# Patient Record
Sex: FEMALE | Race: OTHER | Employment: OTHER
[De-identification: names, ages, dates, MRNs, and addresses within clinical notes are randomized per-mention and may not be internally consistent; named-entity substitution may affect disease eponyms.]

---

## 2017-01-25 ENCOUNTER — TELEPHONE (OUTPATIENT)
Dept: OBGYN | Facility: CLINIC | Age: 62
End: 2017-01-25

## 2017-01-31 ENCOUNTER — OFFICE VISIT (OUTPATIENT)
Dept: FAMILY MEDICINE CLINIC | Facility: CLINIC | Age: 62
End: 2017-01-31

## 2017-01-31 VITALS
DIASTOLIC BLOOD PRESSURE: 58 MMHG | WEIGHT: 193 LBS | BODY MASS INDEX: 31.02 KG/M2 | RESPIRATION RATE: 18 BRPM | SYSTOLIC BLOOD PRESSURE: 98 MMHG | HEIGHT: 66 IN | TEMPERATURE: 98.6 F

## 2017-01-31 DIAGNOSIS — R50.9 FEVER, UNSPECIFIED FEVER CAUSE: ICD-10-CM

## 2017-01-31 DIAGNOSIS — J18.9 PNEUMONIA OF RIGHT LOWER LOBE DUE TO INFECTIOUS ORGANISM: Primary | ICD-10-CM

## 2017-01-31 DIAGNOSIS — R05.9 COUGH: ICD-10-CM

## 2017-01-31 LAB
INFLUENZA A ANTIBODY: NEGATIVE
INFLUENZA B ANTIBODY: NEGATIVE
S PYO AG THROAT QL: NORMAL

## 2017-01-31 PROCEDURE — 87804 INFLUENZA ASSAY W/OPTIC: CPT | Performed by: FAMILY MEDICINE

## 2017-01-31 PROCEDURE — 99213 OFFICE O/P EST LOW 20 MIN: CPT | Performed by: FAMILY MEDICINE

## 2017-01-31 PROCEDURE — 87880 STREP A ASSAY W/OPTIC: CPT | Performed by: FAMILY MEDICINE

## 2017-01-31 RX ORDER — AZITHROMYCIN 250 MG/1
TABLET, FILM COATED ORAL
Qty: 1 PACKET | Refills: 0 | Status: SHIPPED | OUTPATIENT
Start: 2017-01-31 | End: 2017-02-10

## 2017-01-31 ASSESSMENT — ENCOUNTER SYMPTOMS
ABDOMINAL PAIN: 0
HEMOPTYSIS: 0
VOMITING: 0
COLOR CHANGE: 0
WHEEZING: 0
ABDOMINAL DISTENTION: 0
STRIDOR: 0
ANAL BLEEDING: 0
EYE REDNESS: 0
PHOTOPHOBIA: 0
SINUS PRESSURE: 0
EYE DISCHARGE: 0
VOICE CHANGE: 0
APNEA: 0
RHINORRHEA: 0
BLOOD IN STOOL: 0
RECTAL PAIN: 0
EYE ITCHING: 0
NAUSEA: 0
EYE PAIN: 0
SORE THROAT: 1
CHEST TIGHTNESS: 0
TROUBLE SWALLOWING: 0
CHOKING: 0
CONSTIPATION: 0
COUGH: 0
DIARRHEA: 0
SHORTNESS OF BREATH: 0
ALLERGIC/IMMUNOLOGIC NEGATIVE: 1
BACK PAIN: 0
FACIAL SWELLING: 0
HEARTBURN: 0

## 2017-02-27 DIAGNOSIS — E03.9 ACQUIRED HYPOTHYROIDISM: Primary | ICD-10-CM

## 2017-03-30 ENCOUNTER — HOSPITAL ENCOUNTER (OUTPATIENT)
Age: 62
Discharge: HOME | End: 2017-03-30
Payer: MEDICARE

## 2017-03-30 DIAGNOSIS — E03.9 ACQUIRED HYPOTHYROIDISM: ICD-10-CM

## 2017-03-30 LAB — TSH SERPL DL<=0.05 MIU/L-ACNC: 2.56 MIU/L (ref 0.3–5)

## 2017-03-30 PROCEDURE — 84443 ASSAY THYROID STIM HORMONE: CPT

## 2017-03-30 PROCEDURE — 36415 COLL VENOUS BLD VENIPUNCTURE: CPT

## 2017-04-04 ENCOUNTER — OFFICE VISIT (OUTPATIENT)
Dept: FAMILY MEDICINE CLINIC | Age: 62
End: 2017-04-04
Payer: MEDICARE

## 2017-04-04 VITALS
DIASTOLIC BLOOD PRESSURE: 72 MMHG | RESPIRATION RATE: 16 BRPM | HEIGHT: 66 IN | HEART RATE: 72 BPM | SYSTOLIC BLOOD PRESSURE: 124 MMHG | BODY MASS INDEX: 31.5 KG/M2 | WEIGHT: 196 LBS

## 2017-04-04 DIAGNOSIS — K21.9 GERD WITHOUT ESOPHAGITIS: ICD-10-CM

## 2017-04-04 DIAGNOSIS — E03.9 ACQUIRED HYPOTHYROIDISM: ICD-10-CM

## 2017-04-04 DIAGNOSIS — H61.21 EXCESSIVE CERUMEN IN RIGHT EAR CANAL: Primary | ICD-10-CM

## 2017-04-04 PROCEDURE — 69209 REMOVE IMPACTED EAR WAX UNI: CPT | Performed by: FAMILY MEDICINE

## 2017-04-04 PROCEDURE — 99213 OFFICE O/P EST LOW 20 MIN: CPT | Performed by: FAMILY MEDICINE

## 2017-04-04 RX ORDER — OMEPRAZOLE 20 MG/1
20 CAPSULE, DELAYED RELEASE ORAL DAILY
Qty: 30 CAPSULE | Refills: 5 | Status: SHIPPED | OUTPATIENT
Start: 2017-04-04 | End: 2017-09-25 | Stop reason: SDUPTHER

## 2017-04-04 RX ORDER — LEVOTHYROXINE SODIUM 0.05 MG/1
50 TABLET ORAL DAILY
Qty: 30 TABLET | Refills: 5 | Status: SHIPPED | OUTPATIENT
Start: 2017-04-04 | End: 2017-09-25 | Stop reason: SDUPTHER

## 2017-04-04 ASSESSMENT — ENCOUNTER SYMPTOMS
COUGH: 0
CHEST TIGHTNESS: 0
COLOR CHANGE: 0
SORE THROAT: 0
NAUSEA: 0
VOMITING: 0
CHOKING: 0
CONSTIPATION: 0
BELCHING: 0
WATER BRASH: 0
ALLERGIC/IMMUNOLOGIC NEGATIVE: 1
WHEEZING: 0
DIARRHEA: 0
HOARSE VOICE: 0
BACK PAIN: 0
PHOTOPHOBIA: 0
EYE ITCHING: 0
ANAL BLEEDING: 0
HEARTBURN: 1
ABDOMINAL DISTENTION: 0
VOICE CHANGE: 0
RECTAL PAIN: 0
ABDOMINAL PAIN: 0
STRIDOR: 0
SINUS PRESSURE: 0
FACIAL SWELLING: 0
APNEA: 0
EYE REDNESS: 0
EYE DISCHARGE: 0
GLOBUS SENSATION: 0
EYE PAIN: 0
BLOOD IN STOOL: 0
SHORTNESS OF BREATH: 0
RHINORRHEA: 0
TROUBLE SWALLOWING: 0

## 2017-09-05 ENCOUNTER — OFFICE VISIT (OUTPATIENT)
Dept: SURGERY | Age: 62
End: 2017-09-05
Payer: MEDICARE

## 2017-09-05 VITALS
SYSTOLIC BLOOD PRESSURE: 127 MMHG | WEIGHT: 197 LBS | HEIGHT: 66 IN | RESPIRATION RATE: 18 BRPM | HEART RATE: 72 BPM | DIASTOLIC BLOOD PRESSURE: 73 MMHG | BODY MASS INDEX: 31.66 KG/M2

## 2017-09-05 DIAGNOSIS — R10.9 CHRONIC ABDOMINAL PAIN: Primary | ICD-10-CM

## 2017-09-05 DIAGNOSIS — Z87.19 HISTORY OF GASTRITIS: ICD-10-CM

## 2017-09-05 DIAGNOSIS — N90.4 VULVAR DYSTROPHY: Primary | ICD-10-CM

## 2017-09-05 DIAGNOSIS — N28.1 BILATERAL RENAL CYSTS: ICD-10-CM

## 2017-09-05 DIAGNOSIS — G89.29 CHRONIC ABDOMINAL PAIN: Primary | ICD-10-CM

## 2017-09-05 DIAGNOSIS — Z86.19 HISTORY OF HELICOBACTER PYLORI INFECTION: ICD-10-CM

## 2017-09-05 PROCEDURE — 99214 OFFICE O/P EST MOD 30 MIN: CPT | Performed by: SURGERY

## 2017-09-06 RX ORDER — CLOBETASOL PROPIONATE 0.5 MG/G
CREAM TOPICAL
Qty: 60 G | Refills: 1 | Status: SHIPPED | OUTPATIENT
Start: 2017-09-06 | End: 2018-01-30 | Stop reason: ALTCHOICE

## 2017-09-15 ENCOUNTER — HOSPITAL ENCOUNTER (INPATIENT)
Age: 62
LOS: 3 days | Discharge: HOME | DRG: 139 | End: 2017-09-18
Attending: EMERGENCY MEDICINE | Admitting: INTERNAL MEDICINE
Payer: MEDICARE

## 2017-09-15 ENCOUNTER — APPOINTMENT (OUTPATIENT)
Dept: GENERAL RADIOLOGY | Age: 62
DRG: 139 | End: 2017-09-15
Payer: MEDICARE

## 2017-09-15 DIAGNOSIS — J18.9 PNEUMONIA DUE TO ORGANISM: Primary | ICD-10-CM

## 2017-09-15 DIAGNOSIS — R09.02 HYPOXIA: ICD-10-CM

## 2017-09-15 LAB
ABSOLUTE EOS #: ABNORMAL K/UL (ref 0–0.4)
ABSOLUTE LYMPH #: 0.9 K/UL (ref 1–4.8)
ABSOLUTE MONO #: 0.68 K/UL (ref 0–1)
ALBUMIN SERPL-MCNC: 3.6 G/DL (ref 3.5–5.2)
ALBUMIN/GLOBULIN RATIO: ABNORMAL (ref 1–2.5)
ALP BLD-CCNC: 73 U/L (ref 35–104)
ALT SERPL-CCNC: 35 U/L (ref 5–33)
ANION GAP SERPL CALCULATED.3IONS-SCNC: 11 MMOL/L (ref 9–17)
AST SERPL-CCNC: 16 U/L
BASOPHILS # BLD: ABNORMAL %
BASOPHILS ABSOLUTE: ABNORMAL K/UL (ref 0–0.2)
BILIRUB SERPL-MCNC: 1.32 MG/DL (ref 0.3–1.2)
BUN BLDV-MCNC: 20 MG/DL (ref 8–23)
BUN/CREAT BLD: 23 (ref 9–20)
CALCIUM SERPL-MCNC: 9.1 MG/DL (ref 8.6–10.4)
CHLORIDE BLD-SCNC: 98 MMOL/L (ref 98–107)
CO2: 26 MMOL/L (ref 20–31)
CREAT SERPL-MCNC: 0.88 MG/DL (ref 0.5–0.9)
DIFFERENTIAL TYPE: ABNORMAL
EOSINOPHILS RELATIVE PERCENT: ABNORMAL %
FIO2: 21
GFR AFRICAN AMERICAN: >60 ML/MIN
GFR NON-AFRICAN AMERICAN: >60 ML/MIN
GFR SERPL CREATININE-BSD FRML MDRD: ABNORMAL ML/MIN/{1.73_M2}
GFR SERPL CREATININE-BSD FRML MDRD: ABNORMAL ML/MIN/{1.73_M2}
GLUCOSE BLD-MCNC: 115 MG/DL (ref 70–99)
GLUCOSE BLD-MCNC: 116 MG/DL (ref 65–99)
HCO3: 26.2 MMOL/L (ref 22–26)
HCT VFR BLD CALC: 43.7 % (ref 36–46)
HEMOGLOBIN: 14.6 G/DL (ref 12–16)
LYMPHOCYTES # BLD: 4 %
MCH RBC QN AUTO: 28 PG (ref 26–34)
MCHC RBC AUTO-ENTMCNC: 33.3 G/DL (ref 31–37)
MCV RBC AUTO: 84.3 FL (ref 80–100)
MONOCYTES # BLD: 3 %
MORPHOLOGY: ABNORMAL
NEGATIVE BASE EXCESS: ABNORMAL (ref 0–2)
O2 DEVICE/FLOW/%: ABNORMAL
O2 SATURATION: 86 % (ref 95–98)
PATIENT TEMP: ABNORMAL
PCO2: 37 MM HG (ref 35–45)
PDW BLD-RTO: 14.7 % (ref 12.1–15.2)
PH: 7.46 (ref 7.27–7.47)
PLATELET # BLD: 211 K/UL (ref 140–450)
PLATELET ESTIMATE: ABNORMAL
PMV BLD AUTO: ABNORMAL FL (ref 6–12)
PO2: 48 MM HG (ref 80–100)
POC PCO2 TEMP: ABNORMAL MM HG
POC PH TEMP: ABNORMAL
POC PO2 TEMP: ABNORMAL MM HG
POC TCO2: 27 MMOL/L (ref 24–29)
POSITIVE BASE EXCESS: 2 (ref 0–2)
POTASSIUM SERPL-SCNC: 4 MMOL/L (ref 3.7–5.3)
RBC # BLD: 5.19 M/UL (ref 4–5.2)
RBC # BLD: ABNORMAL 10*6/UL
SEG NEUTROPHILS: 93 %
SEGMENTED NEUTROPHILS ABSOLUTE COUNT: 21.02 K/UL (ref 2.5–7)
SODIUM BLD-SCNC: 135 MMOL/L (ref 135–144)
TOTAL PROTEIN: 8 G/DL (ref 6.4–8.3)
WBC # BLD: 22.6 K/UL (ref 3.5–11)
WBC # BLD: ABNORMAL 10*3/UL

## 2017-09-15 PROCEDURE — 6360000002 HC RX W HCPCS: Performed by: INTERNAL MEDICINE

## 2017-09-15 PROCEDURE — 99284 EMERGENCY DEPT VISIT MOD MDM: CPT

## 2017-09-15 PROCEDURE — 6360000002 HC RX W HCPCS: Performed by: EMERGENCY MEDICINE

## 2017-09-15 PROCEDURE — 94667 MNPJ CHEST WALL 1ST: CPT

## 2017-09-15 PROCEDURE — 80053 COMPREHEN METABOLIC PANEL: CPT

## 2017-09-15 PROCEDURE — 82947 ASSAY GLUCOSE BLOOD QUANT: CPT

## 2017-09-15 PROCEDURE — 94668 MNPJ CHEST WALL SBSQ: CPT

## 2017-09-15 PROCEDURE — 82803 BLOOD GASES ANY COMBINATION: CPT

## 2017-09-15 PROCEDURE — 94664 DEMO&/EVAL PT USE INHALER: CPT

## 2017-09-15 PROCEDURE — 85025 COMPLETE CBC W/AUTO DIFF WBC: CPT

## 2017-09-15 PROCEDURE — 71020 XR CHEST STANDARD TWO VW: CPT

## 2017-09-15 PROCEDURE — 36600 WITHDRAWAL OF ARTERIAL BLOOD: CPT

## 2017-09-15 PROCEDURE — 87040 BLOOD CULTURE FOR BACTERIA: CPT

## 2017-09-15 PROCEDURE — 94640 AIRWAY INHALATION TREATMENT: CPT

## 2017-09-15 PROCEDURE — 1200000000 HC SEMI PRIVATE

## 2017-09-15 PROCEDURE — 93005 ELECTROCARDIOGRAM TRACING: CPT

## 2017-09-15 PROCEDURE — 6370000000 HC RX 637 (ALT 250 FOR IP): Performed by: INTERNAL MEDICINE

## 2017-09-15 PROCEDURE — 36415 COLL VENOUS BLD VENIPUNCTURE: CPT

## 2017-09-15 PROCEDURE — 2580000003 HC RX 258: Performed by: INTERNAL MEDICINE

## 2017-09-15 PROCEDURE — 96365 THER/PROPH/DIAG IV INF INIT: CPT

## 2017-09-15 PROCEDURE — 94761 N-INVAS EAR/PLS OXIMETRY MLT: CPT

## 2017-09-15 RX ORDER — SODIUM CHLORIDE 0.9 % (FLUSH) 0.9 %
10 SYRINGE (ML) INJECTION PRN
Status: DISCONTINUED | OUTPATIENT
Start: 2017-09-15 | End: 2017-09-18 | Stop reason: HOSPADM

## 2017-09-15 RX ORDER — ONDANSETRON 2 MG/ML
4 INJECTION INTRAMUSCULAR; INTRAVENOUS EVERY 6 HOURS PRN
Status: DISCONTINUED | OUTPATIENT
Start: 2017-09-15 | End: 2017-09-18 | Stop reason: HOSPADM

## 2017-09-15 RX ORDER — ALBUTEROL SULFATE 2.5 MG/3ML
2.5 SOLUTION RESPIRATORY (INHALATION) EVERY 6 HOURS PRN
COMMUNITY
End: 2017-10-10

## 2017-09-15 RX ORDER — PREDNISONE 20 MG/1
20 TABLET ORAL DAILY
COMMUNITY
End: 2017-10-10

## 2017-09-15 RX ORDER — LEVOFLOXACIN 5 MG/ML
500 INJECTION, SOLUTION INTRAVENOUS ONCE
Status: COMPLETED | OUTPATIENT
Start: 2017-09-15 | End: 2017-09-15

## 2017-09-15 RX ORDER — LEVOTHYROXINE SODIUM 0.03 MG/1
50 TABLET ORAL DAILY
Status: DISCONTINUED | OUTPATIENT
Start: 2017-09-15 | End: 2017-09-18 | Stop reason: HOSPADM

## 2017-09-15 RX ORDER — ACETAMINOPHEN 325 MG/1
650 TABLET ORAL EVERY 4 HOURS PRN
Status: DISCONTINUED | OUTPATIENT
Start: 2017-09-15 | End: 2017-09-18 | Stop reason: HOSPADM

## 2017-09-15 RX ORDER — BENZONATATE 100 MG/1
100 CAPSULE ORAL 3 TIMES DAILY PRN
COMMUNITY
End: 2017-11-27 | Stop reason: SDUPTHER

## 2017-09-15 RX ORDER — SODIUM CHLORIDE 0.9 % (FLUSH) 0.9 %
10 SYRINGE (ML) INJECTION EVERY 12 HOURS SCHEDULED
Status: DISCONTINUED | OUTPATIENT
Start: 2017-09-15 | End: 2017-09-18 | Stop reason: HOSPADM

## 2017-09-15 RX ORDER — ALBUTEROL SULFATE 2.5 MG/3ML
2.5 SOLUTION RESPIRATORY (INHALATION) ONCE
Status: COMPLETED | OUTPATIENT
Start: 2017-09-15 | End: 2017-09-15

## 2017-09-15 RX ORDER — LEVOFLOXACIN 5 MG/ML
500 INJECTION, SOLUTION INTRAVENOUS EVERY 24 HOURS
Status: DISCONTINUED | OUTPATIENT
Start: 2017-09-16 | End: 2017-09-16

## 2017-09-15 RX ORDER — SODIUM CHLORIDE 9 MG/ML
INJECTION, SOLUTION INTRAVENOUS CONTINUOUS
Status: DISCONTINUED | OUTPATIENT
Start: 2017-09-15 | End: 2017-09-17

## 2017-09-15 RX ORDER — ALBUTEROL SULFATE 2.5 MG/3ML
2.5 SOLUTION RESPIRATORY (INHALATION) EVERY 4 HOURS
Status: DISCONTINUED | OUTPATIENT
Start: 2017-09-15 | End: 2017-09-17

## 2017-09-15 RX ORDER — CLOBETASOL PROPIONATE 0.5 MG/G
CREAM TOPICAL 2 TIMES DAILY
Status: DISCONTINUED | OUTPATIENT
Start: 2017-09-15 | End: 2017-09-18 | Stop reason: HOSPADM

## 2017-09-15 RX ORDER — METHYLPREDNISOLONE SODIUM SUCCINATE 125 MG/2ML
80 INJECTION, POWDER, LYOPHILIZED, FOR SOLUTION INTRAMUSCULAR; INTRAVENOUS EVERY 8 HOURS SCHEDULED
Status: DISCONTINUED | OUTPATIENT
Start: 2017-09-15 | End: 2017-09-17

## 2017-09-15 RX ORDER — PANTOPRAZOLE SODIUM 40 MG/1
40 TABLET, DELAYED RELEASE ORAL
Status: DISCONTINUED | OUTPATIENT
Start: 2017-09-15 | End: 2017-09-18 | Stop reason: HOSPADM

## 2017-09-15 RX ADMIN — Medication 10 ML: at 21:35

## 2017-09-15 RX ADMIN — ALBUTEROL SULFATE 2.5 MG: 2.5 SOLUTION RESPIRATORY (INHALATION) at 17:37

## 2017-09-15 RX ADMIN — METHYLPREDNISOLONE SODIUM SUCCINATE 80 MG: 125 INJECTION, POWDER, FOR SOLUTION INTRAMUSCULAR; INTRAVENOUS at 16:37

## 2017-09-15 RX ADMIN — ALBUTEROL SULFATE 2.5 MG: 2.5 SOLUTION RESPIRATORY (INHALATION) at 09:52

## 2017-09-15 RX ADMIN — METHYLPREDNISOLONE SODIUM SUCCINATE 80 MG: 125 INJECTION, POWDER, FOR SOLUTION INTRAMUSCULAR; INTRAVENOUS at 21:35

## 2017-09-15 RX ADMIN — LEVOFLOXACIN 500 MG: 5 INJECTION, SOLUTION INTRAVENOUS at 12:00

## 2017-09-15 RX ADMIN — ALBUTEROL SULFATE 2.5 MG: 2.5 SOLUTION RESPIRATORY (INHALATION) at 21:04

## 2017-09-15 RX ADMIN — SODIUM CHLORIDE: 9 INJECTION, SOLUTION INTRAVENOUS at 13:48

## 2017-09-15 RX ADMIN — ENOXAPARIN SODIUM 40 MG: 40 INJECTION SUBCUTANEOUS at 16:36

## 2017-09-15 RX ADMIN — PANTOPRAZOLE SODIUM 40 MG: 40 TABLET, DELAYED RELEASE ORAL at 16:37

## 2017-09-15 RX ADMIN — ALBUTEROL SULFATE 2.5 MG: 2.5 SOLUTION RESPIRATORY (INHALATION) at 10:51

## 2017-09-15 ASSESSMENT — ENCOUNTER SYMPTOMS
CHOKING: 0
WHEEZING: 1
SINUS PRESSURE: 0
COUGH: 1
TROUBLE SWALLOWING: 0
CHEST TIGHTNESS: 0
GASTROINTESTINAL NEGATIVE: 1
ALLERGIC/IMMUNOLOGIC NEGATIVE: 1
RHINORRHEA: 0
EYE DISCHARGE: 0
APNEA: 0
FACIAL SWELLING: 0
SINUS CONGESTION: 0
VOICE CHANGE: 0
SHORTNESS OF BREATH: 0
STRIDOR: 0
EYES NEGATIVE: 1
SORE THROAT: 0

## 2017-09-15 ASSESSMENT — PAIN SCALES - GENERAL: PAINLEVEL_OUTOF10: 0

## 2017-09-16 LAB
ABSOLUTE BANDS #: 0.77 K/UL (ref 0–1)
ABSOLUTE EOS #: ABNORMAL K/UL (ref 0–0.4)
ABSOLUTE LYMPH #: 0.77 K/UL (ref 1–4.8)
ABSOLUTE MONO #: 0.39 K/UL (ref 0–1)
ANION GAP SERPL CALCULATED.3IONS-SCNC: 9 MMOL/L (ref 9–17)
BANDS: 4 %
BASOPHILS # BLD: ABNORMAL %
BASOPHILS ABSOLUTE: ABNORMAL K/UL (ref 0–0.2)
BUN BLDV-MCNC: 14 MG/DL (ref 8–23)
BUN/CREAT BLD: 23 (ref 9–20)
CALCIUM SERPL-MCNC: 9.1 MG/DL (ref 8.6–10.4)
CHLORIDE BLD-SCNC: 106 MMOL/L (ref 98–107)
CO2: 26 MMOL/L (ref 20–31)
CREAT SERPL-MCNC: 0.6 MG/DL (ref 0.5–0.9)
DIFFERENTIAL TYPE: ABNORMAL
EKG ATRIAL RATE: 79 BPM
EKG P AXIS: 65 DEGREES
EKG P-R INTERVAL: 146 MS
EKG Q-T INTERVAL: 366 MS
EKG QRS DURATION: 110 MS
EKG QTC CALCULATION (BAZETT): 419 MS
EKG R AXIS: 67 DEGREES
EKG T AXIS: 66 DEGREES
EKG VENTRICULAR RATE: 79 BPM
EOSINOPHILS RELATIVE PERCENT: ABNORMAL %
GFR AFRICAN AMERICAN: >60 ML/MIN
GFR NON-AFRICAN AMERICAN: >60 ML/MIN
GFR SERPL CREATININE-BSD FRML MDRD: ABNORMAL ML/MIN/{1.73_M2}
GFR SERPL CREATININE-BSD FRML MDRD: ABNORMAL ML/MIN/{1.73_M2}
GLUCOSE BLD-MCNC: 149 MG/DL (ref 70–99)
HCT VFR BLD CALC: 40.2 % (ref 36–46)
HEMOGLOBIN: 13.4 G/DL (ref 12–16)
LYMPHOCYTES # BLD: 4 %
MCH RBC QN AUTO: 28.5 PG (ref 26–34)
MCHC RBC AUTO-ENTMCNC: 33.2 G/DL (ref 31–37)
MCV RBC AUTO: 85.8 FL (ref 80–100)
MONOCYTES # BLD: 2 %
MORPHOLOGY: ABNORMAL
PDW BLD-RTO: 15 % (ref 12.1–15.2)
PLATELET # BLD: 193 K/UL (ref 140–450)
PLATELET ESTIMATE: ABNORMAL
PMV BLD AUTO: ABNORMAL FL (ref 6–12)
POTASSIUM SERPL-SCNC: 3.9 MMOL/L (ref 3.7–5.3)
RBC # BLD: 4.68 M/UL (ref 4–5.2)
RBC # BLD: ABNORMAL 10*6/UL
SEG NEUTROPHILS: 90 %
SEGMENTED NEUTROPHILS ABSOLUTE COUNT: 17.37 K/UL (ref 2.5–7)
SODIUM BLD-SCNC: 141 MMOL/L (ref 135–144)
WBC # BLD: 19.3 K/UL (ref 3.5–11)
WBC # BLD: ABNORMAL 10*3/UL

## 2017-09-16 PROCEDURE — 6360000002 HC RX W HCPCS: Performed by: INTERNAL MEDICINE

## 2017-09-16 PROCEDURE — 1200000000 HC SEMI PRIVATE

## 2017-09-16 PROCEDURE — 6370000000 HC RX 637 (ALT 250 FOR IP): Performed by: INTERNAL MEDICINE

## 2017-09-16 PROCEDURE — 94640 AIRWAY INHALATION TREATMENT: CPT

## 2017-09-16 PROCEDURE — 80048 BASIC METABOLIC PNL TOTAL CA: CPT

## 2017-09-16 PROCEDURE — 2580000003 HC RX 258: Performed by: INTERNAL MEDICINE

## 2017-09-16 PROCEDURE — 94761 N-INVAS EAR/PLS OXIMETRY MLT: CPT

## 2017-09-16 PROCEDURE — 36415 COLL VENOUS BLD VENIPUNCTURE: CPT

## 2017-09-16 PROCEDURE — 94668 MNPJ CHEST WALL SBSQ: CPT

## 2017-09-16 PROCEDURE — 85025 COMPLETE CBC W/AUTO DIFF WBC: CPT

## 2017-09-16 RX ORDER — LEVOFLOXACIN 5 MG/ML
750 INJECTION, SOLUTION INTRAVENOUS EVERY 24 HOURS
Status: DISCONTINUED | OUTPATIENT
Start: 2017-09-16 | End: 2017-09-18 | Stop reason: HOSPADM

## 2017-09-16 RX ORDER — POLYETHYLENE GLYCOL 3350 17 G/17G
17 POWDER, FOR SOLUTION ORAL DAILY
Status: DISCONTINUED | OUTPATIENT
Start: 2017-09-16 | End: 2017-09-18 | Stop reason: HOSPADM

## 2017-09-16 RX ADMIN — SODIUM CHLORIDE: 9 INJECTION, SOLUTION INTRAVENOUS at 20:01

## 2017-09-16 RX ADMIN — ALBUTEROL SULFATE 2.5 MG: 2.5 SOLUTION RESPIRATORY (INHALATION) at 13:18

## 2017-09-16 RX ADMIN — POLYETHYLENE GLYCOL 3350 17 G: 17 POWDER, FOR SOLUTION ORAL at 09:40

## 2017-09-16 RX ADMIN — PANTOPRAZOLE SODIUM 40 MG: 40 TABLET, DELAYED RELEASE ORAL at 06:00

## 2017-09-16 RX ADMIN — ALBUTEROL SULFATE 2.5 MG: 2.5 SOLUTION RESPIRATORY (INHALATION) at 17:48

## 2017-09-16 RX ADMIN — METHYLPREDNISOLONE SODIUM SUCCINATE 80 MG: 125 INJECTION, POWDER, FOR SOLUTION INTRAMUSCULAR; INTRAVENOUS at 05:36

## 2017-09-16 RX ADMIN — METHYLPREDNISOLONE SODIUM SUCCINATE 80 MG: 125 INJECTION, POWDER, FOR SOLUTION INTRAMUSCULAR; INTRAVENOUS at 14:33

## 2017-09-16 RX ADMIN — PANTOPRAZOLE SODIUM 40 MG: 40 TABLET, DELAYED RELEASE ORAL at 16:56

## 2017-09-16 RX ADMIN — ALBUTEROL SULFATE 2.5 MG: 2.5 SOLUTION RESPIRATORY (INHALATION) at 05:17

## 2017-09-16 RX ADMIN — ENOXAPARIN SODIUM 40 MG: 40 INJECTION SUBCUTANEOUS at 09:32

## 2017-09-16 RX ADMIN — SODIUM CHLORIDE: 9 INJECTION, SOLUTION INTRAVENOUS at 02:33

## 2017-09-16 RX ADMIN — LEVOTHYROXINE SODIUM 50 MCG: 25 TABLET ORAL at 05:37

## 2017-09-16 RX ADMIN — ALBUTEROL SULFATE 2.5 MG: 2.5 SOLUTION RESPIRATORY (INHALATION) at 20:44

## 2017-09-16 RX ADMIN — LEVOFLOXACIN 750 MG: 5 INJECTION, SOLUTION INTRAVENOUS at 12:20

## 2017-09-16 RX ADMIN — METHYLPREDNISOLONE SODIUM SUCCINATE 80 MG: 125 INJECTION, POWDER, FOR SOLUTION INTRAMUSCULAR; INTRAVENOUS at 21:35

## 2017-09-16 RX ADMIN — ALBUTEROL SULFATE 2.5 MG: 2.5 SOLUTION RESPIRATORY (INHALATION) at 09:02

## 2017-09-16 RX ADMIN — ALBUTEROL SULFATE 2.5 MG: 2.5 SOLUTION RESPIRATORY (INHALATION) at 01:15

## 2017-09-16 ASSESSMENT — PAIN SCALES - GENERAL: PAINLEVEL_OUTOF10: 0

## 2017-09-17 LAB
ABSOLUTE BANDS #: 0.43 K/UL (ref 0–1)
ABSOLUTE EOS #: ABNORMAL K/UL (ref 0–0.4)
ABSOLUTE LYMPH #: 1.08 K/UL (ref 1–4.8)
ABSOLUTE MONO #: 0.43 K/UL (ref 0–1)
ANION GAP SERPL CALCULATED.3IONS-SCNC: 12 MMOL/L (ref 9–17)
BANDS: 2 %
BASOPHILS # BLD: ABNORMAL %
BASOPHILS ABSOLUTE: ABNORMAL K/UL (ref 0–0.2)
BUN BLDV-MCNC: 14 MG/DL (ref 8–23)
BUN/CREAT BLD: 24 (ref 9–20)
CALCIUM SERPL-MCNC: 8.9 MG/DL (ref 8.6–10.4)
CHLORIDE BLD-SCNC: 106 MMOL/L (ref 98–107)
CO2: 23 MMOL/L (ref 20–31)
CREAT SERPL-MCNC: 0.58 MG/DL (ref 0.5–0.9)
DIFFERENTIAL TYPE: ABNORMAL
EKG ATRIAL RATE: 102 BPM
EKG Q-T INTERVAL: 298 MS
EKG QRS DURATION: 114 MS
EKG QTC CALCULATION (BAZETT): 386 MS
EKG R AXIS: 63 DEGREES
EKG T AXIS: 50 DEGREES
EKG VENTRICULAR RATE: 101 BPM
EOSINOPHILS RELATIVE PERCENT: ABNORMAL %
GFR AFRICAN AMERICAN: >60 ML/MIN
GFR NON-AFRICAN AMERICAN: >60 ML/MIN
GFR SERPL CREATININE-BSD FRML MDRD: ABNORMAL ML/MIN/{1.73_M2}
GFR SERPL CREATININE-BSD FRML MDRD: ABNORMAL ML/MIN/{1.73_M2}
GLUCOSE BLD-MCNC: 152 MG/DL (ref 70–99)
HCT VFR BLD CALC: 39.9 % (ref 36–46)
HEMOGLOBIN: 13.1 G/DL (ref 12–16)
LYMPHOCYTES # BLD: 5 %
MAGNESIUM: 2 MG/DL (ref 1.6–2.6)
MCH RBC QN AUTO: 28.3 PG (ref 26–34)
MCHC RBC AUTO-ENTMCNC: 32.8 G/DL (ref 31–37)
MCV RBC AUTO: 86.2 FL (ref 80–100)
MONOCYTES # BLD: 2 %
MORPHOLOGY: ABNORMAL
PDW BLD-RTO: 14.9 % (ref 12.1–15.2)
PLATELET # BLD: 211 K/UL (ref 140–450)
PLATELET ESTIMATE: ABNORMAL
PMV BLD AUTO: ABNORMAL FL (ref 6–12)
POTASSIUM SERPL-SCNC: 3.9 MMOL/L (ref 3.7–5.3)
RBC # BLD: 4.63 M/UL (ref 4–5.2)
RBC # BLD: ABNORMAL 10*6/UL
SEG NEUTROPHILS: 91 %
SEGMENTED NEUTROPHILS ABSOLUTE COUNT: 19.56 K/UL (ref 2.5–7)
SODIUM BLD-SCNC: 141 MMOL/L (ref 135–144)
THYROXINE, FREE: 1.31 NG/DL (ref 0.93–1.7)
TROPONIN INTERP: NORMAL
TROPONIN INTERP: NORMAL
TROPONIN T: <0.03 NG/ML
TROPONIN T: <0.03 NG/ML
TSH SERPL DL<=0.05 MIU/L-ACNC: 0.41 MIU/L (ref 0.3–5)
WBC # BLD: 21.5 K/UL (ref 3.5–11)
WBC # BLD: ABNORMAL 10*3/UL

## 2017-09-17 PROCEDURE — 94761 N-INVAS EAR/PLS OXIMETRY MLT: CPT

## 2017-09-17 PROCEDURE — 84443 ASSAY THYROID STIM HORMONE: CPT

## 2017-09-17 PROCEDURE — 6360000002 HC RX W HCPCS: Performed by: INTERNAL MEDICINE

## 2017-09-17 PROCEDURE — 84439 ASSAY OF FREE THYROXINE: CPT

## 2017-09-17 PROCEDURE — 2500000003 HC RX 250 WO HCPCS: Performed by: INTERNAL MEDICINE

## 2017-09-17 PROCEDURE — 2580000003 HC RX 258: Performed by: INTERNAL MEDICINE

## 2017-09-17 PROCEDURE — 36415 COLL VENOUS BLD VENIPUNCTURE: CPT

## 2017-09-17 PROCEDURE — 80048 BASIC METABOLIC PNL TOTAL CA: CPT

## 2017-09-17 PROCEDURE — 1200000000 HC SEMI PRIVATE

## 2017-09-17 PROCEDURE — 84484 ASSAY OF TROPONIN QUANT: CPT

## 2017-09-17 PROCEDURE — 83735 ASSAY OF MAGNESIUM: CPT

## 2017-09-17 PROCEDURE — 6370000000 HC RX 637 (ALT 250 FOR IP): Performed by: INTERNAL MEDICINE

## 2017-09-17 PROCEDURE — 94640 AIRWAY INHALATION TREATMENT: CPT

## 2017-09-17 PROCEDURE — 93005 ELECTROCARDIOGRAM TRACING: CPT

## 2017-09-17 PROCEDURE — 85025 COMPLETE CBC W/AUTO DIFF WBC: CPT

## 2017-09-17 PROCEDURE — 94668 MNPJ CHEST WALL SBSQ: CPT

## 2017-09-17 RX ORDER — DILTIAZEM HYDROCHLORIDE 5 MG/ML
10 INJECTION INTRAVENOUS ONCE
Status: COMPLETED | OUTPATIENT
Start: 2017-09-17 | End: 2017-09-17

## 2017-09-17 RX ORDER — LEVALBUTEROL INHALATION SOLUTION 1.25 MG/3ML
1.25 SOLUTION RESPIRATORY (INHALATION) EVERY 4 HOURS
Status: DISCONTINUED | OUTPATIENT
Start: 2017-09-17 | End: 2017-09-18 | Stop reason: HOSPADM

## 2017-09-17 RX ORDER — METHYLPREDNISOLONE SODIUM SUCCINATE 40 MG/ML
40 INJECTION, POWDER, LYOPHILIZED, FOR SOLUTION INTRAMUSCULAR; INTRAVENOUS EVERY 8 HOURS SCHEDULED
Status: DISCONTINUED | OUTPATIENT
Start: 2017-09-17 | End: 2017-09-18 | Stop reason: HOSPADM

## 2017-09-17 RX ORDER — DILTIAZEM HYDROCHLORIDE 120 MG/1
120 CAPSULE, COATED, EXTENDED RELEASE ORAL DAILY
Status: DISCONTINUED | OUTPATIENT
Start: 2017-09-17 | End: 2017-09-18 | Stop reason: HOSPADM

## 2017-09-17 RX ADMIN — Medication 10 ML: at 13:47

## 2017-09-17 RX ADMIN — ENOXAPARIN SODIUM 90 MG: 100 INJECTION SUBCUTANEOUS at 07:50

## 2017-09-17 RX ADMIN — METHYLPREDNISOLONE SODIUM SUCCINATE 40 MG: 40 INJECTION, POWDER, FOR SOLUTION INTRAMUSCULAR; INTRAVENOUS at 14:00

## 2017-09-17 RX ADMIN — LEVALBUTEROL 1.25 MG: 1.25 SOLUTION RESPIRATORY (INHALATION) at 09:30

## 2017-09-17 RX ADMIN — ALBUTEROL SULFATE 2.5 MG: 2.5 SOLUTION RESPIRATORY (INHALATION) at 01:01

## 2017-09-17 RX ADMIN — Medication 10 ML: at 23:51

## 2017-09-17 RX ADMIN — POLYETHYLENE GLYCOL 3350 17 G: 17 POWDER, FOR SOLUTION ORAL at 10:02

## 2017-09-17 RX ADMIN — ALBUTEROL SULFATE 2.5 MG: 2.5 SOLUTION RESPIRATORY (INHALATION) at 05:11

## 2017-09-17 RX ADMIN — DILTIAZEM HYDROCHLORIDE 120 MG: 120 CAPSULE, EXTENDED RELEASE ORAL at 07:50

## 2017-09-17 RX ADMIN — Medication 10 ML: at 08:10

## 2017-09-17 RX ADMIN — DILTIAZEM HYDROCHLORIDE 10 MG: 5 INJECTION INTRAVENOUS at 07:50

## 2017-09-17 RX ADMIN — ENOXAPARIN SODIUM 90 MG: 100 INJECTION SUBCUTANEOUS at 20:43

## 2017-09-17 RX ADMIN — Medication 10 ML: at 07:51

## 2017-09-17 RX ADMIN — METHYLPREDNISOLONE SODIUM SUCCINATE 40 MG: 40 INJECTION, POWDER, FOR SOLUTION INTRAMUSCULAR; INTRAVENOUS at 21:48

## 2017-09-17 RX ADMIN — LEVALBUTEROL 1.25 MG: 1.25 SOLUTION RESPIRATORY (INHALATION) at 17:12

## 2017-09-17 RX ADMIN — Medication 10 ML: at 07:06

## 2017-09-17 RX ADMIN — Medication 10 ML: at 12:03

## 2017-09-17 RX ADMIN — LEVALBUTEROL 1.25 MG: 1.25 SOLUTION RESPIRATORY (INHALATION) at 13:31

## 2017-09-17 RX ADMIN — PANTOPRAZOLE SODIUM 40 MG: 40 TABLET, DELAYED RELEASE ORAL at 15:50

## 2017-09-17 RX ADMIN — LEVOFLOXACIN 750 MG: 5 INJECTION, SOLUTION INTRAVENOUS at 12:02

## 2017-09-17 RX ADMIN — DILTIAZEM HYDROCHLORIDE 10 MG: 5 INJECTION INTRAVENOUS at 07:05

## 2017-09-17 RX ADMIN — LEVALBUTEROL 1.25 MG: 1.25 SOLUTION RESPIRATORY (INHALATION) at 21:17

## 2017-09-17 RX ADMIN — METHYLPREDNISOLONE SODIUM SUCCINATE 80 MG: 125 INJECTION, POWDER, FOR SOLUTION INTRAMUSCULAR; INTRAVENOUS at 06:16

## 2017-09-17 RX ADMIN — PANTOPRAZOLE SODIUM 40 MG: 40 TABLET, DELAYED RELEASE ORAL at 06:16

## 2017-09-17 RX ADMIN — LEVOTHYROXINE SODIUM 50 MCG: 25 TABLET ORAL at 06:16

## 2017-09-17 ASSESSMENT — PAIN SCALES - GENERAL: PAINLEVEL_OUTOF10: 0

## 2017-09-18 ENCOUNTER — TELEPHONE (OUTPATIENT)
Dept: CARDIOLOGY CLINIC | Age: 62
End: 2017-09-18

## 2017-09-18 VITALS
SYSTOLIC BLOOD PRESSURE: 149 MMHG | RESPIRATION RATE: 18 BRPM | TEMPERATURE: 97.7 F | BODY MASS INDEX: 31.58 KG/M2 | HEART RATE: 65 BPM | WEIGHT: 196.5 LBS | HEIGHT: 66 IN | OXYGEN SATURATION: 95 % | DIASTOLIC BLOOD PRESSURE: 76 MMHG

## 2017-09-18 DIAGNOSIS — R94.31 ABNORMAL EKG: Primary | ICD-10-CM

## 2017-09-18 LAB
ABSOLUTE EOS #: ABNORMAL K/UL (ref 0–0.4)
ABSOLUTE LYMPH #: 1.06 K/UL (ref 1–4.8)
ABSOLUTE MONO #: 0.53 K/UL (ref 0–1)
ANION GAP SERPL CALCULATED.3IONS-SCNC: 13 MMOL/L (ref 9–17)
BASOPHILS # BLD: ABNORMAL %
BASOPHILS ABSOLUTE: ABNORMAL K/UL (ref 0–0.2)
BUN BLDV-MCNC: 17 MG/DL (ref 8–23)
BUN/CREAT BLD: 26 (ref 9–20)
CALCIUM SERPL-MCNC: 9 MG/DL (ref 8.6–10.4)
CHLORIDE BLD-SCNC: 104 MMOL/L (ref 98–107)
CO2: 25 MMOL/L (ref 20–31)
CREAT SERPL-MCNC: 0.65 MG/DL (ref 0.5–0.9)
DIFFERENTIAL TYPE: ABNORMAL
EKG ATRIAL RATE: 91 BPM
EKG P AXIS: 72 DEGREES
EKG P-R INTERVAL: 148 MS
EKG Q-T INTERVAL: 374 MS
EKG QRS DURATION: 110 MS
EKG QTC CALCULATION (BAZETT): 460 MS
EKG R AXIS: 60 DEGREES
EKG T AXIS: 65 DEGREES
EKG VENTRICULAR RATE: 91 BPM
EOSINOPHILS RELATIVE PERCENT: ABNORMAL %
GFR AFRICAN AMERICAN: >60 ML/MIN
GFR NON-AFRICAN AMERICAN: >60 ML/MIN
GFR SERPL CREATININE-BSD FRML MDRD: ABNORMAL ML/MIN/{1.73_M2}
GFR SERPL CREATININE-BSD FRML MDRD: ABNORMAL ML/MIN/{1.73_M2}
GLUCOSE BLD-MCNC: 140 MG/DL (ref 70–99)
HCT VFR BLD CALC: 42.5 % (ref 36–46)
HEMOGLOBIN: 13.9 G/DL (ref 12–16)
LV EF: 55 %
LVEF MODALITY: NORMAL
LYMPHOCYTES # BLD: 6 %
MCH RBC QN AUTO: 28.4 PG (ref 26–34)
MCHC RBC AUTO-ENTMCNC: 32.6 G/DL (ref 31–37)
MCV RBC AUTO: 87.2 FL (ref 80–100)
MONOCYTES # BLD: 3 %
MORPHOLOGY: ABNORMAL
PDW BLD-RTO: 15.4 % (ref 12.1–15.2)
PLATELET # BLD: 264 K/UL (ref 140–450)
PLATELET ESTIMATE: ABNORMAL
PMV BLD AUTO: ABNORMAL FL (ref 6–12)
POTASSIUM SERPL-SCNC: 3.9 MMOL/L (ref 3.7–5.3)
RBC # BLD: 4.87 M/UL (ref 4–5.2)
RBC # BLD: ABNORMAL 10*6/UL
SEG NEUTROPHILS: 91 %
SEGMENTED NEUTROPHILS ABSOLUTE COUNT: 16.01 K/UL (ref 2.5–7)
SODIUM BLD-SCNC: 142 MMOL/L (ref 135–144)
WBC # BLD: 17.6 K/UL (ref 3.5–11)
WBC # BLD: ABNORMAL 10*3/UL

## 2017-09-18 PROCEDURE — 6360000002 HC RX W HCPCS: Performed by: INTERNAL MEDICINE

## 2017-09-18 PROCEDURE — 80048 BASIC METABOLIC PNL TOTAL CA: CPT

## 2017-09-18 PROCEDURE — 93005 ELECTROCARDIOGRAM TRACING: CPT

## 2017-09-18 PROCEDURE — 99254 IP/OBS CNSLTJ NEW/EST MOD 60: CPT | Performed by: INTERNAL MEDICINE

## 2017-09-18 PROCEDURE — 93306 TTE W/DOPPLER COMPLETE: CPT

## 2017-09-18 PROCEDURE — 2580000003 HC RX 258: Performed by: INTERNAL MEDICINE

## 2017-09-18 PROCEDURE — 36415 COLL VENOUS BLD VENIPUNCTURE: CPT

## 2017-09-18 PROCEDURE — 6370000000 HC RX 637 (ALT 250 FOR IP): Performed by: INTERNAL MEDICINE

## 2017-09-18 PROCEDURE — 85025 COMPLETE CBC W/AUTO DIFF WBC: CPT

## 2017-09-18 PROCEDURE — 94760 N-INVAS EAR/PLS OXIMETRY 1: CPT

## 2017-09-18 PROCEDURE — 94640 AIRWAY INHALATION TREATMENT: CPT

## 2017-09-18 RX ORDER — LEVOFLOXACIN 500 MG/1
500 TABLET, FILM COATED ORAL DAILY
Qty: 5 TABLET | Refills: 0 | Status: SHIPPED | OUTPATIENT
Start: 2017-09-18 | End: 2017-09-23

## 2017-09-18 RX ORDER — DILTIAZEM HYDROCHLORIDE 120 MG/1
120 CAPSULE, COATED, EXTENDED RELEASE ORAL DAILY
Qty: 30 CAPSULE | Refills: 2 | Status: SHIPPED | OUTPATIENT
Start: 2017-09-18 | End: 2017-10-10 | Stop reason: SDUPTHER

## 2017-09-18 RX ADMIN — PANTOPRAZOLE SODIUM 40 MG: 40 TABLET, DELAYED RELEASE ORAL at 05:42

## 2017-09-18 RX ADMIN — POLYETHYLENE GLYCOL 3350 17 G: 17 POWDER, FOR SOLUTION ORAL at 08:35

## 2017-09-18 RX ADMIN — LEVALBUTEROL 1.25 MG: 1.25 SOLUTION RESPIRATORY (INHALATION) at 05:08

## 2017-09-18 RX ADMIN — Medication 10 ML: at 05:42

## 2017-09-18 RX ADMIN — LEVOTHYROXINE SODIUM 50 MCG: 25 TABLET ORAL at 05:42

## 2017-09-18 RX ADMIN — LEVALBUTEROL 1.25 MG: 1.25 SOLUTION RESPIRATORY (INHALATION) at 01:05

## 2017-09-18 RX ADMIN — METHYLPREDNISOLONE SODIUM SUCCINATE 40 MG: 40 INJECTION, POWDER, FOR SOLUTION INTRAMUSCULAR; INTRAVENOUS at 05:42

## 2017-09-18 RX ADMIN — Medication 10 ML: at 08:35

## 2017-09-18 RX ADMIN — DILTIAZEM HYDROCHLORIDE 120 MG: 120 CAPSULE, EXTENDED RELEASE ORAL at 08:35

## 2017-09-19 ENCOUNTER — TELEPHONE (OUTPATIENT)
Dept: FAMILY MEDICINE CLINIC | Age: 62
End: 2017-09-19

## 2017-09-19 LAB
EKG ATRIAL RATE: 74 BPM
EKG P AXIS: 77 DEGREES
EKG P-R INTERVAL: 130 MS
EKG Q-T INTERVAL: 400 MS
EKG QRS DURATION: 122 MS
EKG QTC CALCULATION (BAZETT): 444 MS
EKG R AXIS: 79 DEGREES
EKG T AXIS: 73 DEGREES
EKG VENTRICULAR RATE: 74 BPM

## 2017-09-21 LAB
CULTURE: NORMAL
Lab: NORMAL
Lab: NORMAL
SPECIMEN DESCRIPTION: NORMAL
SPECIMEN DESCRIPTION: NORMAL
STATUS: NORMAL
STATUS: NORMAL

## 2017-09-25 ENCOUNTER — OFFICE VISIT (OUTPATIENT)
Dept: FAMILY MEDICINE CLINIC | Age: 62
End: 2017-09-25
Payer: MEDICARE

## 2017-09-25 VITALS
HEIGHT: 66 IN | SYSTOLIC BLOOD PRESSURE: 102 MMHG | WEIGHT: 191 LBS | DIASTOLIC BLOOD PRESSURE: 62 MMHG | BODY MASS INDEX: 30.7 KG/M2

## 2017-09-25 DIAGNOSIS — R73.03 PREDIABETES: ICD-10-CM

## 2017-09-25 DIAGNOSIS — I48.91 ATRIAL FIBRILLATION, UNSPECIFIED TYPE (HCC): ICD-10-CM

## 2017-09-25 DIAGNOSIS — J18.9 PNEUMONIA OF RIGHT LOWER LOBE DUE TO INFECTIOUS ORGANISM: Primary | ICD-10-CM

## 2017-09-25 DIAGNOSIS — E03.9 ACQUIRED HYPOTHYROIDISM: ICD-10-CM

## 2017-09-25 DIAGNOSIS — Z13.1 SCREENING FOR DIABETES MELLITUS: ICD-10-CM

## 2017-09-25 DIAGNOSIS — K21.9 GERD WITHOUT ESOPHAGITIS: ICD-10-CM

## 2017-09-25 LAB
AVERAGE GLUCOSE: NORMAL
HBA1C MFR BLD: 6 %

## 2017-09-25 PROCEDURE — 99213 OFFICE O/P EST LOW 20 MIN: CPT | Performed by: FAMILY MEDICINE

## 2017-09-25 RX ORDER — LEVOTHYROXINE SODIUM 0.05 MG/1
50 TABLET ORAL DAILY
Qty: 90 TABLET | Refills: 2 | Status: SHIPPED | OUTPATIENT
Start: 2017-09-25 | End: 2018-02-09 | Stop reason: SDUPTHER

## 2017-09-25 RX ORDER — BENZONATATE 100 MG/1
100 CAPSULE ORAL 3 TIMES DAILY PRN
Qty: 30 CAPSULE | Refills: 0 | Status: CANCELLED | OUTPATIENT
Start: 2017-09-25

## 2017-09-25 RX ORDER — OMEPRAZOLE 20 MG/1
20 CAPSULE, DELAYED RELEASE ORAL DAILY
Qty: 90 CAPSULE | Refills: 2 | Status: SHIPPED | OUTPATIENT
Start: 2017-09-25 | End: 2018-03-26 | Stop reason: SDUPTHER

## 2017-09-25 ASSESSMENT — ENCOUNTER SYMPTOMS
BLOOD IN STOOL: 0
EYE DISCHARGE: 0
RECTAL PAIN: 0
TROUBLE SWALLOWING: 0
NAUSEA: 0
EYE REDNESS: 0
ANAL BLEEDING: 0
VOICE CHANGE: 0
WHEEZING: 0
EYE ITCHING: 0
SINUS PRESSURE: 0
SHORTNESS OF BREATH: 0
SORE THROAT: 0
FACIAL SWELLING: 0
CHEST TIGHTNESS: 0
RHINORRHEA: 0
CHOKING: 0
PHOTOPHOBIA: 0
ALLERGIC/IMMUNOLOGIC NEGATIVE: 1
ABDOMINAL PAIN: 0
EYE PAIN: 0
STRIDOR: 0
VOMITING: 0
COUGH: 0
APNEA: 0
BACK PAIN: 0
ABDOMINAL DISTENTION: 0
CONSTIPATION: 0
DIARRHEA: 0
COLOR CHANGE: 0

## 2017-09-25 ASSESSMENT — PATIENT HEALTH QUESTIONNAIRE - PHQ9
2. FEELING DOWN, DEPRESSED OR HOPELESS: 0
SUM OF ALL RESPONSES TO PHQ QUESTIONS 1-9: 0
SUM OF ALL RESPONSES TO PHQ9 QUESTIONS 1 & 2: 0
1. LITTLE INTEREST OR PLEASURE IN DOING THINGS: 0

## 2017-09-28 ENCOUNTER — HOSPITAL ENCOUNTER (OUTPATIENT)
Dept: NUCLEAR MEDICINE | Age: 62
Discharge: HOME | End: 2017-09-28
Payer: MEDICARE

## 2017-09-28 ENCOUNTER — HOSPITAL ENCOUNTER (OUTPATIENT)
Dept: NON INVASIVE DIAGNOSTICS | Age: 62
Discharge: HOME | End: 2017-09-28
Payer: MEDICARE

## 2017-09-28 VITALS — HEART RATE: 85 BPM | SYSTOLIC BLOOD PRESSURE: 141 MMHG | DIASTOLIC BLOOD PRESSURE: 70 MMHG

## 2017-09-28 DIAGNOSIS — R94.31 ABNORMAL EKG: ICD-10-CM

## 2017-09-28 PROCEDURE — 93017 CV STRESS TEST TRACING ONLY: CPT

## 2017-09-28 PROCEDURE — 3430000000 HC RX DIAGNOSTIC RADIOPHARMACEUTICAL: Performed by: INTERNAL MEDICINE

## 2017-09-28 PROCEDURE — A9500 TC99M SESTAMIBI: HCPCS | Performed by: INTERNAL MEDICINE

## 2017-09-28 PROCEDURE — 2580000003 HC RX 258: Performed by: INTERNAL MEDICINE

## 2017-09-28 PROCEDURE — 6360000002 HC RX W HCPCS: Performed by: INTERNAL MEDICINE

## 2017-09-28 PROCEDURE — 78452 HT MUSCLE IMAGE SPECT MULT: CPT

## 2017-09-28 RX ORDER — AMINOPHYLLINE DIHYDRATE 25 MG/ML
100 INJECTION, SOLUTION INTRAVENOUS
Status: ACTIVE | OUTPATIENT
Start: 2017-09-28 | End: 2017-09-28

## 2017-09-28 RX ORDER — 0.9 % SODIUM CHLORIDE 0.9 %
10 VIAL (ML) INJECTION PRN
Status: DISCONTINUED | OUTPATIENT
Start: 2017-09-28 | End: 2017-09-29 | Stop reason: HOSPADM

## 2017-09-28 RX ORDER — AMINOPHYLLINE DIHYDRATE 25 MG/ML
50 INJECTION, SOLUTION INTRAVENOUS
Status: DISPENSED | OUTPATIENT
Start: 2017-09-28 | End: 2017-09-28

## 2017-09-28 RX ADMIN — REGADENOSON 0.4 MG: 0.08 INJECTION, SOLUTION INTRAVENOUS at 10:07

## 2017-09-28 RX ADMIN — Medication 10 ML: at 10:07

## 2017-09-28 RX ADMIN — TETRAKIS(2-METHOXYISOBUTYLISOCYANIDE)COPPER(I) TETRAFLUOROBORATE 11.1 MILLICURIE: 1 INJECTION, POWDER, LYOPHILIZED, FOR SOLUTION INTRAVENOUS at 08:35

## 2017-09-28 RX ADMIN — TETRAKIS(2-METHOXYISOBUTYLISOCYANIDE)COPPER(I) TETRAFLUOROBORATE 31.4 MILLICURIE: 1 INJECTION, POWDER, LYOPHILIZED, FOR SOLUTION INTRAVENOUS at 10:07

## 2017-10-02 ASSESSMENT — ENCOUNTER SYMPTOMS
COUGH: 0
BACK PAIN: 0
SHORTNESS OF BREATH: 0
NAUSEA: 1
CHOKING: 0
BLOOD IN STOOL: 0
SORE THROAT: 0
ABDOMINAL PAIN: 1
VOMITING: 0
TROUBLE SWALLOWING: 0

## 2017-10-02 NOTE — PROGRESS NOTES
Subjective:      Patient ID: James Womack is a 57 y.o. female. CC Abdominal pain    HPI        Ms Amado Christian returns for reevaluation of abdominal pain. She is a 58 yo HF with a very long history of nonspecific abdominal pain. Colonoscopy 2015 was normal.  She was found to have H pylori gastritis on EGD, and was appropriately treated. Her epigastric symptoms have improved significantly. She still has occasional nausea. Now with new lower abdominal pain. Daily BM's, formed brown stool, without blood. Occasional diarrhea. Pain is often postprandial.  Previous MRI December 2016 showed multiple renal cysts, for which 6 month repeat MRI was recommended. That has not been done as of yet. Review of Systems   Constitutional: Positive for fatigue. Negative for activity change, appetite change, chills, fever and unexpected weight change. HENT: Negative for nosebleeds, sneezing, sore throat and trouble swallowing. Eyes: Negative for visual disturbance. Respiratory: Negative for cough, choking and shortness of breath. Cardiovascular: Negative for chest pain, palpitations and leg swelling. Gastrointestinal: Positive for abdominal pain (Lower) and nausea. Negative for blood in stool and vomiting. Genitourinary: Negative for dysuria, flank pain and hematuria. Musculoskeletal: Positive for arthralgias. Negative for back pain, gait problem and myalgias. Allergic/Immunologic: Negative for immunocompromised state. Neurological: Positive for headaches. Negative for dizziness, seizures, syncope and weakness. Hematological: Does not bruise/bleed easily. Psychiatric/Behavioral: Negative for confusion and sleep disturbance. Objective:   Physical Exam   Constitutional: She is oriented to person, place, and time. She appears well-developed and well-nourished. HENT:   Head: Normocephalic and atraumatic.    Mouth/Throat: Oropharynx is clear and moist.   Eyes: Conjunctivae and EOM are normal. Pupils are equal, round, and reactive to light. No scleral icterus. Neck: Normal range of motion. Neck supple. No JVD present. No tracheal deviation present. Cardiovascular: Normal rate and regular rhythm. Pulmonary/Chest: Effort normal and breath sounds normal. No respiratory distress. She exhibits no tenderness. Abdominal: Soft. Bowel sounds are normal. She exhibits no distension and no mass. There is no tenderness. There is no rebound and no guarding. Musculoskeletal: Normal range of motion. She exhibits no edema. Lymphadenopathy:     She has no cervical adenopathy. Neurological: She is alert and oriented to person, place, and time. No cranial nerve deficit. Skin: Skin is warm and dry. No rash noted. No erythema. Psychiatric: She has a normal mood and affect. Her behavior is normal. Judgment and thought content normal.   Nursing note and vitals reviewed. MRI Abdomen W WO Contrast   Status: Final result   Order Providers   Authorizing Encounter Billing   Ike Torres MD [facility] CAT SCAN ROOM Ike Torres MD   Signed by   Signed Date/Time    Phone Pager   Nora Tirado 12/14/2016 14:52 [phone]    Reading Radiologists   Read Date Phone Pager   [employer] Console Dec 14, 2016 [phone]    Reviewed By Keila Torres MD on 3/20/2017  8:36 AM   Ike Torres MD on 3/20/2017  8:36 AM   Narrative   MRI ABDOMEN W WO CONTRAST       Indication: Renal lesions       Comparison: CT 7/20/2016       Pre and post Magnevist 18 mL IV       Study limited by motion.       Liver is normal other than a few cysts. Normal spleen, pancreas, adrenals, and    gallbladder. No biliary or pancreatic ductal dilation.       Multiple kidney lesions appear stable. Most are simple cysts. Stable T1    hyperintense lesions in the left kidney, measuring 0.9 cm medially and 1.1 cm    posteriorly. The posterior 1.1 cm lesion does not have enhancement.  Assessment    for enhancement of the medial lesion is more difficult, because of motion.       No enlarged lymph node or ascites. The major vessels are patent.                   Impression       Multiple stable appearing renal cysts including probable hemorrhagic left    kidney cysts. Posteriorly located hemorrhagic cyst does not have enhancement. The more medially located lesion is more difficult to evaluate because of    motion. Recommend continued follow-up in 6 months     Assessment:      1. Chronic abdominal pain  H. Pylori Breath Test    H. Pylori Antigen, EIA   2. Bilateral renal cysts  MRI ABDOMEN W WO CONTRAST   3. History of Helicobacter pylori infection  H. Pylori Breath Test    H. Pylori Antigen, EIA   4. History of gastritis           Plan:      Discussed at length with Ms Lisandra Hylton once again the nature of her multiple abdominal complaints. Sami/English interpretation provided by Hawk Sen. Will repeat abd MRI as per recommended in December 2016, for evaluation of multiple renal cysts. Will check H pylori stool studies. HIDA scan, if no gallstones on MRI. Continue PPI, bland diet for now. Follow up with me in 2-3 weeks.

## 2017-10-10 ENCOUNTER — OFFICE VISIT (OUTPATIENT)
Dept: CARDIOLOGY CLINIC | Age: 62
End: 2017-10-10
Payer: MEDICARE

## 2017-10-10 ENCOUNTER — HOSPITAL ENCOUNTER (OUTPATIENT)
Dept: MRI IMAGING | Age: 62
Discharge: HOME | End: 2017-10-10
Payer: MEDICARE

## 2017-10-10 VITALS
HEART RATE: 90 BPM | WEIGHT: 198 LBS | BODY MASS INDEX: 31.96 KG/M2 | OXYGEN SATURATION: 95 % | SYSTOLIC BLOOD PRESSURE: 130 MMHG | DIASTOLIC BLOOD PRESSURE: 70 MMHG

## 2017-10-10 DIAGNOSIS — I48.0 PAROXYSMAL ATRIAL FIBRILLATION (HCC): ICD-10-CM

## 2017-10-10 DIAGNOSIS — N28.1 BILATERAL RENAL CYSTS: ICD-10-CM

## 2017-10-10 DIAGNOSIS — E03.9 ACQUIRED HYPOTHYROIDISM: Primary | ICD-10-CM

## 2017-10-10 PROCEDURE — 74183 MRI ABD W/O CNTR FLWD CNTR: CPT

## 2017-10-10 PROCEDURE — A9579 GAD-BASE MR CONTRAST NOS,1ML: HCPCS | Performed by: SURGERY

## 2017-10-10 PROCEDURE — 6360000004 HC RX CONTRAST MEDICATION: Performed by: SURGERY

## 2017-10-10 PROCEDURE — 99214 OFFICE O/P EST MOD 30 MIN: CPT | Performed by: INTERNAL MEDICINE

## 2017-10-10 RX ORDER — DILTIAZEM HYDROCHLORIDE 120 MG/1
120 CAPSULE, COATED, EXTENDED RELEASE ORAL DAILY
Qty: 30 CAPSULE | Refills: 11 | Status: SHIPPED | OUTPATIENT
Start: 2017-10-10 | End: 2017-12-14 | Stop reason: SDUPTHER

## 2017-10-10 RX ADMIN — GADOTERIDOL 18 ML: 279.3 INJECTION, SOLUTION INTRAVENOUS at 10:55

## 2017-10-10 NOTE — LETTER
Chucky Pastrana M.D. [address] [place] [address]  [phone]          October 10, 2017        Flore[facility]ustamante. [facility]      RE:  [place]  : 1955    Dear Dr. Ton Zheng:    [address]:  1. Atrial fibrillation. 2.  Systemic anticoagulation. HISTORY OF PRESENT ILLNESS:  I had the pleasure of seeing Mrs. Heather Pérez in our office on 10/10/2017. She is a pleasant 49-year-old [place]i-American female who had a  as she speaks florencia Romeo. There is a family history of coronary artery disease. She came into the emergency room on 09/15/2017 with left lower lobe pneumonia with a white count of 22,000. The following day she developed atrial fibrillation with RVR and was given IV Cardizem and converted back to sinus rhythm approximately 12 hours later. She had no history of atrial fibrillation and could not feel whatsoever that her heart was \"out of rhythm. \"    She recovered from her pneumonia. I did an echocardiogram during her hospitalization which showed an EF of 55% with mild to severe left ventricular hypertrophy with mildly dilated left and right atrium. She had mild MR. After her discharge, I did a Cardiolite stress test on , which was read as normal without evidence of ischemia. I am seeing her today in followup. She was placed on Xarelto on which she has remained since discharge. We also have continued Cardizem. She has done well since her discharge. She has had no shortness of breath. No PND, orthopnea, or pedal edema. No syncope or near syncope. She is tolerating the Xarelto without difficulty. She has had no chest pain or chest discomfort. CARDIAC RISK FACTORS:  Hypertension:  Positive. Other Family Members:  Positive. Peripheral Vascular Disease:  Negative. Smoking:  Negative. Diabetes:  Negative. Hyperlipidemia:  Negative. MEDICATIONS:  At home, she is currently on Cardizem  mg daily, Synthroid 50 mcg daily, Prilosec 20 mg daily, Xarelto 20 mg daily. PAST MEDICAL HISTORY:  History of peptic ulcer disease. She has had hypothyroidism, on replacement. She has had hypertension and long history of asthma. She had a colonoscopy by Dr. Sarahy Park, which was negative. FAMILY HISTORY:  Mother had diabetes and CAD including a pacemaker. Father had diabetes. SOCIAL HISTORY:  She is 57years old, , has 5 children. Does not smoke or drink alcohol. She is accompanied by her son, Carlene Llamas. Carlene Llamas has three boys aged 9, 3, and 1. Mrs. Jacob Alvarez has 5 children. She remains active. She is  and we treat her  for chronic atrial fibrillation also. REVIEW OF SYSTEMS:  Cardiac as above. Other 10 systems reviewed including neurologic, psychiatric, pulmonary, cardiac, GI, , renal, and musculoskeletal.  She has had no weight loss or weight gain, no change in bowel habits, no blood in stools. No fevers, sweats or chills. PHYSICAL EXAMINATION:   VITAL SIGNS:  Her blood pressure was 130/70 with a heart rate of 90 and regular. Respiratory rate 18. O2 saturation 95%. Weight 198 pounds. GENERAL:  She is a pleasant 52-year-old [place]ican-American female. Denied pain. She was oriented to person, place and time. Answered questions appropriately. SKIN:  No unusual skin changes. HEENT:  The pupils are equally round and reactive to light and accommodation. Extraocular movements were intact. Mucous membranes were dry. NECK:  No JVD. Good carotid pulses. No carotid bruits. No lymphadenopathy or thyromegaly. CARDIOVASCULAR EXAM:  S1 and S2 were normal.  No S3 or S4. Soft systolic blowing type murmur. No diastolic murmur. PMI was normal.  No lifts, thrusts or pericardial friction rub. LUNGS:  Quite clear to auscultation and percussion. ABDOMEN:  Soft and nontender. Good bowel sounds.   The aorta was not Therefore, I would recommend continuing Xarelto. I went over this with her and her son. CHADS score is 2 with hypertension and female as her risks. She is also 62 and will be approaching 63 at which her CHADS score will increase to 3. She and her son understand and are willing to stay on Xarelto. I will reevaluate her in six months and again decide whether to continue Xarelto. There is no evidence she has any coronary artery disease and she has normal LV function. I will do no further testing at this time. Thank you very much for allowing me the privilege of seeing Mrs. Anthony Rubalcava. Any questions on my thoughts, please do not hesitate to contact me.     Sincerely,        Mendoza Cook    D: 10/11/2017 6:42:31       T: 10/11/2017 22:00:10     [email or web address]  Job#: [id number]     Doc#: [id number]

## 2017-10-17 NOTE — PROGRESS NOTES
hospitalization. Therefore, I would recommend continuing Xarelto. I went over this with her and her son. CHADS score is 2 with hypertension and female as her risks. She is also 62 and will be approaching 63 at which her CHADS score will increase to 3. She and her son understand and are willing to stay on Xarelto. I will reevaluate her in six months and again decide whether to continue Xarelto. There is no evidence she has any coronary artery disease and she has normal LV function. I will do no further testing at this time. Thank you very much for allowing me the privilege of seeing Mrs. Jl Holbrook. Any questions on my thoughts, please do not hesitate to contact me.     Sincerely,        Lashaun Hernandez    D: 10/11/2017 6:42:31       T: 10/11/2017 22:00:10     [email or web address]  Job#: [id number]     Doc#: [id number]

## 2017-10-31 ENCOUNTER — OFFICE VISIT (OUTPATIENT)
Dept: SURGERY | Age: 62
End: 2017-10-31
Payer: MEDICARE

## 2017-10-31 VITALS
WEIGHT: 196 LBS | DIASTOLIC BLOOD PRESSURE: 77 MMHG | BODY MASS INDEX: 31.5 KG/M2 | SYSTOLIC BLOOD PRESSURE: 135 MMHG | HEIGHT: 66 IN | HEART RATE: 80 BPM | RESPIRATION RATE: 18 BRPM

## 2017-10-31 DIAGNOSIS — G89.29 CHRONIC ABDOMINAL PAIN: Primary | ICD-10-CM

## 2017-10-31 DIAGNOSIS — R10.9 CHRONIC ABDOMINAL PAIN: Primary | ICD-10-CM

## 2017-10-31 DIAGNOSIS — Z87.19 HISTORY OF GASTRITIS: ICD-10-CM

## 2017-10-31 DIAGNOSIS — N28.1 BILATERAL RENAL CYSTS: ICD-10-CM

## 2017-10-31 DIAGNOSIS — Z86.19 HISTORY OF HELICOBACTER PYLORI INFECTION: ICD-10-CM

## 2017-10-31 PROCEDURE — G8427 DOCREV CUR MEDS BY ELIG CLIN: HCPCS | Performed by: SURGERY

## 2017-10-31 PROCEDURE — 99213 OFFICE O/P EST LOW 20 MIN: CPT | Performed by: SURGERY

## 2017-10-31 PROCEDURE — 3014F SCREEN MAMMO DOC REV: CPT | Performed by: SURGERY

## 2017-10-31 PROCEDURE — 3017F COLORECTAL CA SCREEN DOC REV: CPT | Performed by: SURGERY

## 2017-10-31 PROCEDURE — 1036F TOBACCO NON-USER: CPT | Performed by: SURGERY

## 2017-10-31 PROCEDURE — G8417 CALC BMI ABV UP PARAM F/U: HCPCS | Performed by: SURGERY

## 2017-10-31 PROCEDURE — G8484 FLU IMMUNIZE NO ADMIN: HCPCS | Performed by: SURGERY

## 2017-11-12 ENCOUNTER — HOSPITAL ENCOUNTER (EMERGENCY)
Age: 62
Discharge: HOME | End: 2017-11-12
Attending: FAMILY MEDICINE
Payer: MEDICARE

## 2017-11-12 VITALS
TEMPERATURE: 97.6 F | BODY MASS INDEX: 30.83 KG/M2 | SYSTOLIC BLOOD PRESSURE: 119 MMHG | RESPIRATION RATE: 16 BRPM | OXYGEN SATURATION: 92 % | DIASTOLIC BLOOD PRESSURE: 75 MMHG | HEART RATE: 70 BPM | WEIGHT: 191 LBS

## 2017-11-12 DIAGNOSIS — R10.13 ABDOMINAL PAIN, EPIGASTRIC: Primary | ICD-10-CM

## 2017-11-12 LAB
ABSOLUTE BANDS #: 0.46 K/UL (ref 0–1)
ABSOLUTE EOS #: ABNORMAL K/UL (ref 0–0.4)
ABSOLUTE IMMATURE GRANULOCYTE: ABNORMAL K/UL (ref 0–0.3)
ABSOLUTE LYMPH #: 0.57 K/UL (ref 1–4.8)
ABSOLUTE MONO #: 0.57 K/UL (ref 0–1)
ALBUMIN SERPL-MCNC: 4.1 G/DL (ref 3.5–5.2)
ALBUMIN/GLOBULIN RATIO: ABNORMAL (ref 1–2.5)
ALP BLD-CCNC: 94 U/L (ref 35–104)
ALT SERPL-CCNC: 15 U/L (ref 5–33)
AMYLASE: 97 U/L (ref 28–100)
ANION GAP SERPL CALCULATED.3IONS-SCNC: 12 MMOL/L (ref 9–17)
AST SERPL-CCNC: 12 U/L
BANDS: 4 %
BASOPHILS # BLD: ABNORMAL %
BASOPHILS ABSOLUTE: ABNORMAL K/UL (ref 0–0.2)
BILIRUB SERPL-MCNC: 0.53 MG/DL (ref 0.3–1.2)
BUN BLDV-MCNC: 19 MG/DL (ref 8–23)
BUN/CREAT BLD: 29 (ref 9–20)
CALCIUM SERPL-MCNC: 9.8 MG/DL (ref 8.6–10.4)
CHLORIDE BLD-SCNC: 103 MMOL/L (ref 98–107)
CO2: 27 MMOL/L (ref 20–31)
CREAT SERPL-MCNC: 0.65 MG/DL (ref 0.5–0.9)
DIFFERENTIAL TYPE: ABNORMAL
EKG ATRIAL RATE: 75 BPM
EKG P AXIS: 44 DEGREES
EKG P-R INTERVAL: 140 MS
EKG Q-T INTERVAL: 396 MS
EKG QRS DURATION: 106 MS
EKG QTC CALCULATION (BAZETT): 442 MS
EKG R AXIS: 83 DEGREES
EKG T AXIS: 79 DEGREES
EKG VENTRICULAR RATE: 75 BPM
EOSINOPHILS RELATIVE PERCENT: ABNORMAL %
GFR AFRICAN AMERICAN: >60 ML/MIN
GFR NON-AFRICAN AMERICAN: >60 ML/MIN
GFR SERPL CREATININE-BSD FRML MDRD: ABNORMAL ML/MIN/{1.73_M2}
GFR SERPL CREATININE-BSD FRML MDRD: ABNORMAL ML/MIN/{1.73_M2}
GLUCOSE BLD-MCNC: 126 MG/DL (ref 70–99)
HCT VFR BLD CALC: 46.1 % (ref 36–46)
HEMOGLOBIN: 14.9 G/DL (ref 12–16)
IMMATURE GRANULOCYTES: ABNORMAL %
LIPASE: 55 U/L (ref 13–60)
LYMPHOCYTES # BLD: 5 %
MCH RBC QN AUTO: 27.9 PG (ref 26–34)
MCHC RBC AUTO-ENTMCNC: 32.5 G/DL (ref 31–37)
MCV RBC AUTO: 85.9 FL (ref 80–100)
MONOCYTES # BLD: 5 %
MORPHOLOGY: ABNORMAL
PDW BLD-RTO: 15 % (ref 12.1–15.2)
PLATELET # BLD: 233 K/UL (ref 140–450)
PLATELET ESTIMATE: ABNORMAL
PMV BLD AUTO: ABNORMAL FL (ref 6–12)
POTASSIUM SERPL-SCNC: 4.3 MMOL/L (ref 3.7–5.3)
RBC # BLD: 5.36 M/UL (ref 4–5.2)
RBC # BLD: ABNORMAL 10*6/UL
SEG NEUTROPHILS: 86 %
SEGMENTED NEUTROPHILS ABSOLUTE COUNT: 9.8 K/UL (ref 2.5–7)
SODIUM BLD-SCNC: 142 MMOL/L (ref 135–144)
TOTAL PROTEIN: 8.4 G/DL (ref 6.4–8.3)
TROPONIN INTERP: NORMAL
TROPONIN T: <0.03 NG/ML
WBC # BLD: 11.4 K/UL (ref 3.5–11)
WBC # BLD: ABNORMAL 10*3/UL

## 2017-11-12 PROCEDURE — 84484 ASSAY OF TROPONIN QUANT: CPT

## 2017-11-12 PROCEDURE — 85025 COMPLETE CBC W/AUTO DIFF WBC: CPT

## 2017-11-12 PROCEDURE — 96374 THER/PROPH/DIAG INJ IV PUSH: CPT

## 2017-11-12 PROCEDURE — 83690 ASSAY OF LIPASE: CPT

## 2017-11-12 PROCEDURE — 82150 ASSAY OF AMYLASE: CPT

## 2017-11-12 PROCEDURE — 99284 EMERGENCY DEPT VISIT MOD MDM: CPT

## 2017-11-12 PROCEDURE — 80053 COMPREHEN METABOLIC PANEL: CPT

## 2017-11-12 PROCEDURE — 93005 ELECTROCARDIOGRAM TRACING: CPT

## 2017-11-12 PROCEDURE — 6360000002 HC RX W HCPCS: Performed by: FAMILY MEDICINE

## 2017-11-12 RX ORDER — ONDANSETRON 4 MG/1
4 TABLET, ORALLY DISINTEGRATING ORAL EVERY 8 HOURS PRN
Qty: 6 TABLET | Refills: 0 | Status: SHIPPED | OUTPATIENT
Start: 2017-11-12 | End: 2018-01-30 | Stop reason: ALTCHOICE

## 2017-11-12 RX ORDER — ONDANSETRON 4 MG/1
4 TABLET, ORALLY DISINTEGRATING ORAL EVERY 8 HOURS PRN
Qty: 4 TABLET | Refills: 0 | Status: SHIPPED | OUTPATIENT
Start: 2017-11-12 | End: 2017-11-12 | Stop reason: SDUPTHER

## 2017-11-12 RX ORDER — NALBUPHINE HCL 10 MG/ML
10 AMPUL (ML) INJECTION ONCE
Status: COMPLETED | OUTPATIENT
Start: 2017-11-12 | End: 2017-11-12

## 2017-11-12 RX ORDER — ONDANSETRON 2 MG/ML
4 INJECTION INTRAMUSCULAR; INTRAVENOUS EVERY 30 MIN PRN
Status: DISCONTINUED | OUTPATIENT
Start: 2017-11-12 | End: 2017-11-12 | Stop reason: HOSPADM

## 2017-11-12 RX ADMIN — ONDANSETRON 4 MG: 2 INJECTION INTRAMUSCULAR; INTRAVENOUS at 13:40

## 2017-11-12 RX ADMIN — NALBUPHINE HYDROCHLORIDE 10 MG: 10 INJECTION, SOLUTION INTRAMUSCULAR; INTRAVENOUS; SUBCUTANEOUS at 13:40

## 2017-11-12 ASSESSMENT — PAIN DESCRIPTION - FREQUENCY: FREQUENCY: CONTINUOUS

## 2017-11-12 ASSESSMENT — PAIN DESCRIPTION - LOCATION: LOCATION: ABDOMEN

## 2017-11-12 ASSESSMENT — PAIN SCALES - GENERAL
PAINLEVEL_OUTOF10: 8
PAINLEVEL_OUTOF10: 8
PAINLEVEL_OUTOF10: 0

## 2017-11-12 ASSESSMENT — PAIN DESCRIPTION - ORIENTATION: ORIENTATION: UPPER

## 2017-11-12 NOTE — ED PROVIDER NOTES
eMERGENCY dEPARTMENT eNCOUnter      [address] [facility]    Chief Complaint   Patient presents with    Abdominal Pain     abd pain began yesterday , has c/o vomiting and diarrhea    Emesis     x 2    Diarrhea       HPI    Geraldo Pham is a 57 y.o. female who presents With abdominal pain beginning yesterday. Present more in the upper abdomen and crampy in nature. Pain is severely elevated. She has not been able to take any medicine for this because she throws it up. She does not try to take Prilosec. This eggs and fried potatoes at [facility]. May have been trigger for this episode. She has had an MRI for abdominal pain and kidney problems over a recent nature. History of PUD. She also had vomiting for 2 days. No hematemesis. Her diarrhea was watery. The vomiting was worse than the diarrhea. No recent antibiotic use. Recent history of MRI of the abdomen to define the kidney changes. Speaks Gambian with  and son interpreting. REVIEW OF SYSTEMS    All body systems reviewed. Pertinent positives and negative findings are mentioned in the history of present illness.     PAST MEDICAL HISTORY    Past Medical History:   Diagnosis Date    Arthritis     Asthma     Chickenpox     Hypertension     Hypothyroidism     Measles     Mumps     Osteoarthritis     Smallpox     Ulcer ([address]     Ulcer ([address]     Whooping cough        SURGICAL HISTORY    Past Surgical History:   Procedure Laterality Date    COLONOSCOPY  07/20/2015    Cassi King MD    HYSTERECTOMY  2006    Vaginal hysterectomy dr Tiesha Cuevas  07/20/2015    Cassi King MD       CURRENT MEDICATIONS    Current Outpatient Rx   Medication Sig Dispense Refill    diltiazem (CARDIZEM CD) 120 MG extended release capsule Take 1 capsule by mouth daily 30 capsule 11    omeprazole (PRILOSEC) 20 MG delayed release capsule Take 1 capsule by mouth daily 90 capsule 2    rivaroxaban (XARELTO) 20 MG TABS tablet Take 1 tablet by mouth daily (with breakfast) 90 tablet 2    levothyroxine (SYNTHROID) 50 MCG tablet Take 1 tablet by mouth Daily 90 tablet 2    benzonatate (TESSALON) 100 MG capsule Take 100 mg by mouth 3 times daily as needed for Cough      clobetasol (TEMOVATE) 0.05 % cream Daily for 2 wks than twice week 60 g 1       ALLERGIES    No Known Allergies    FAMILY HISTORY    Family History   Problem Relation Age of Onset    Diabetes Father        SOCIAL HISTORY    Social History     Social History    Marital status:      Spouse name: N/A    Number of children: N/A    Years of education: N/A     Social History Main Topics    Smoking status: Never Smoker    Smokeless tobacco: Never Used    Alcohol use No    Drug use: No    Sexual activity: No     Other Topics Concern    None     Social History Narrative    None       PHYSICAL EXAM    VITAL SIGNS: BP (!) 149/64   Pulse 95   Temp 97.6 °F (36.4 °C) (Oral)   Resp 22   Wt 191 lb (86.6 kg)   SpO2 95%   BMI 30.83 kg/m²  speaks [facility] [place]  used. Constitutional:  Well developed, well nourished, Acute epigastric abdominal pain. She appears her stated age. Eyes:  PERRL, conjunctiva normal. Sclerae white. HENT:  Atraumatic, external ears normal, nose normal, oropharynx moist. Neck supple with trachea midline. No JVD. Respiratory:  No respiratory distress, normal breath sounds. No anterior chest wall tenderness. Cardiovascular:  Normal rate, normal rhythm, no murmurs, no gallops, no rubs   GI:  Epigastric tenderness without rigidity. Not distended. No hepatomegaly. Active bowel sounds. :  No suprapubic distention. No flank pain. No inguinal bulge. Musculoskeletal:  Normal extremity movement. No calf pain or ankle edema. Spine is grossly aligned and nontender. Integument:  Well hydrated no generalized rash. Neurologic:  Alert & oriented x 3, no focal deficits   Alert and oriented female who answers questions appropriately through an .   She has coordinated motor movement of her extremities. EKG    NSR with IRBBB    Lab shows WBC 11.4   LFT's normal  ED COURSE & MEDICAL DECISION MAKING    Pertinent Labs & Imaging studies reviewed. (See chart for details)     Summation      Patient Course: 58 yo female with epigastric pain is relieved with IV Zofran and Nubain. History of PUD. No bloody stools. Happened after eating at [facility]. Schedule out patient HIDA scan. Rx for Zofran. Stable and improved at discharge. ED Medications administered this visit:    Medications   ondansetron (ZOFRAN) injection 4 mg (4 mg Intravenous Given 11/12/17 1340)   nalbuphine (NUBAIN) injection 10 mg (10 mg Intravenous Given 11/12/17 1340)       New Prescriptions from this visit:    Discharge Medication List as of 11/12/2017  2:20 PM      START taking these medications    Details   ondansetron (ZOFRAN ODT) 4 MG disintegrating tablet Take 1 tablet by mouth every 8 hours as needed for Nausea or Vomiting, Disp-4 tablet, R-0Normal             Follow-up:  Dr. Malathi Botello after HIDA scan this week  Final Impression:   1.  Abdominal pain, epigastric               (Please note that portions of this note were completed with a voice recognition program.  Efforts were made to edit the dictations but occasionally words are mis-transcribed.)       Ghulam Salcedo DO  11/12/17 9746

## 2017-11-12 NOTE — ED NOTES
Family supportive. Aware of need for urine spec.  Has been medicated for pain     Ayna Box RN  11/12/17 [id number]

## 2017-11-13 ENCOUNTER — HOSPITAL ENCOUNTER (OUTPATIENT)
Dept: NUCLEAR MEDICINE | Age: 62
Discharge: HOME | End: 2017-11-13
Payer: MEDICARE

## 2017-11-13 VITALS — BODY MASS INDEX: 30.7 KG/M2 | WEIGHT: 191 LBS | HEIGHT: 66 IN

## 2017-11-13 DIAGNOSIS — R10.13 ABDOMINAL PAIN, EPIGASTRIC: ICD-10-CM

## 2017-11-13 PROCEDURE — 2580000003 HC RX 258: Performed by: RADIOLOGY

## 2017-11-13 PROCEDURE — 6360000004 HC RX CONTRAST MEDICATION: Performed by: RADIOLOGY

## 2017-11-13 PROCEDURE — 78227 HEPATOBIL SYST IMAGE W/DRUG: CPT

## 2017-11-13 PROCEDURE — A9537 TC99M MEBROFENIN: HCPCS | Performed by: FAMILY MEDICINE

## 2017-11-13 PROCEDURE — 3430000000 HC RX DIAGNOSTIC RADIOPHARMACEUTICAL: Performed by: FAMILY MEDICINE

## 2017-11-13 RX ADMIN — SODIUM CHLORIDE 1.3 MCG: 9 INJECTION, SOLUTION INTRAVENOUS at 12:27

## 2017-11-13 RX ADMIN — Medication 4 MILLICURIE: at 11:25

## 2017-11-27 ENCOUNTER — OFFICE VISIT (OUTPATIENT)
Dept: FAMILY MEDICINE CLINIC | Age: 62
End: 2017-11-27
Payer: MEDICARE

## 2017-11-27 VITALS
WEIGHT: 197 LBS | BODY MASS INDEX: 30.92 KG/M2 | OXYGEN SATURATION: 95 % | RESPIRATION RATE: 20 BRPM | DIASTOLIC BLOOD PRESSURE: 76 MMHG | SYSTOLIC BLOOD PRESSURE: 130 MMHG | HEART RATE: 75 BPM | TEMPERATURE: 97.7 F | HEIGHT: 67 IN

## 2017-11-27 DIAGNOSIS — J21.9 ACUTE BRONCHIOLITIS DUE TO UNSPECIFIED ORGANISM: Primary | ICD-10-CM

## 2017-11-27 DIAGNOSIS — R94.8 ABNORMAL BILIARY HIDA SCAN: ICD-10-CM

## 2017-11-27 DIAGNOSIS — J45.41 MODERATE PERSISTENT ASTHMA WITH EXACERBATION: ICD-10-CM

## 2017-11-27 PROCEDURE — 3014F SCREEN MAMMO DOC REV: CPT | Performed by: INTERNAL MEDICINE

## 2017-11-27 PROCEDURE — 3017F COLORECTAL CA SCREEN DOC REV: CPT | Performed by: INTERNAL MEDICINE

## 2017-11-27 PROCEDURE — G8427 DOCREV CUR MEDS BY ELIG CLIN: HCPCS | Performed by: INTERNAL MEDICINE

## 2017-11-27 PROCEDURE — 99213 OFFICE O/P EST LOW 20 MIN: CPT | Performed by: INTERNAL MEDICINE

## 2017-11-27 PROCEDURE — G8417 CALC BMI ABV UP PARAM F/U: HCPCS | Performed by: INTERNAL MEDICINE

## 2017-11-27 PROCEDURE — 1036F TOBACCO NON-USER: CPT | Performed by: INTERNAL MEDICINE

## 2017-11-27 PROCEDURE — G8484 FLU IMMUNIZE NO ADMIN: HCPCS | Performed by: INTERNAL MEDICINE

## 2017-11-27 RX ORDER — ALBUTEROL SULFATE 2.5 MG/3ML
2.5 SOLUTION RESPIRATORY (INHALATION) EVERY 4 HOURS PRN
Qty: 120 EACH | Refills: 2 | Status: SHIPPED | OUTPATIENT
Start: 2017-11-27 | End: 2019-05-03 | Stop reason: SDUPTHER

## 2017-11-27 RX ORDER — LEVOFLOXACIN 500 MG/1
500 TABLET, FILM COATED ORAL DAILY
Qty: 7 TABLET | Refills: 0 | Status: SHIPPED | OUTPATIENT
Start: 2017-11-27 | End: 2017-12-04

## 2017-11-27 RX ORDER — BENZONATATE 100 MG/1
100 CAPSULE ORAL 3 TIMES DAILY PRN
Qty: 30 CAPSULE | Refills: 0 | Status: SHIPPED | OUTPATIENT
Start: 2017-11-27 | End: 2018-01-30 | Stop reason: ALTCHOICE

## 2017-11-27 RX ORDER — PREDNISONE 10 MG/1
TABLET ORAL
Qty: 20 TABLET | Refills: 0 | Status: SHIPPED | OUTPATIENT
Start: 2017-11-27 | End: 2018-01-30 | Stop reason: ALTCHOICE

## 2017-11-27 ASSESSMENT — ENCOUNTER SYMPTOMS
SORE THROAT: 1
BLURRED VISION: 0
NAUSEA: 0
ABDOMINAL PAIN: 0
HEARTBURN: 0
COUGH: 1
WHEEZING: 1
SHORTNESS OF BREATH: 1

## 2017-11-27 NOTE — PROGRESS NOTES
HPI Notes    Name: Malathi Angeles  : 1955         Chief Complaint:     Chief Complaint   Patient presents with    Cough     coughing up green-yellow phlegm-started a few weeks ago-after getting pneumonia    Abdominal Pain     had xrays done but never given the results       History of Present Illness:        Cough   This is a new problem. The current episode started in the past 7 days. The problem has been gradually worsening. The problem occurs constantly. The cough is productive of purulent sputum. Associated symptoms include a sore throat, shortness of breath and wheezing. Pertinent negatives include no chest pain, chills, ear congestion, ear pain, fever, headaches, heartburn, myalgias or rash. The symptoms are aggravated by cold air. Treatments tried: tried nebulizers, tesalon perles. The treatment provided moderate relief. Her past medical history is significant for asthma and pneumonia. We went over the MRI result from 10/10/17  Also reviewed HIDA scan from 17 which was abnormal    Past Medical History:     Past Medical History:   Diagnosis Date    Arthritis     Asthma     Chickenpox     Hypertension     Hypothyroidism     Measles     Mumps     Osteoarthritis     Smallpox     Ulcer ([address]     Ulcer ([address]     Whooping cough       Reviewed all health maintenance requirements and ordered appropriate tests  There are no preventive care reminders to display for this patient. Past Surgical History:     Past Surgical History:   Procedure Laterality Date    COLONOSCOPY  2015    Ke Jacob MD    HYSTERECTOMY  2006    Vaginal hysterectomy dr Jessica Rasmussen  2015    Ke Jacob MD        Medications:       Prior to Admission medications    Medication Sig Start Date End Date Taking?  Authorizing Provider   levofloxacin (LEVAQUIN) 500 MG tablet Take 1 tablet by mouth daily for 7 days 17 Yes Markos Valle MD   predniSONE (Vandana Bain) 10 2017    MCV 85.9 2017    MCH 27.9 2017    MCHC 32.5 2017    RDW 15.0 2017     2017    MPV NOT REPORTED 2017     Lab Results   Component Value Date    TSH 0.41 2017     Lab Results   Component Value Date    CHOL 147 2015    HDL 52 2015    LABA1C 6.0 2017          Assessment & Plan       1. Acute bronchiolitis due to unspecified organism  levofloxacin (LEVAQUIN) 500 MG tablet    predniSONE (DELTASONE) 10 MG tablet    benzonatate (TESSALON) 100 MG capsule    albuterol (PROVENTIL) (2.5 MG/3ML) 0.083% nebulizer solution   2. Moderate persistent asthma with exacerbation  predniSONE (DELTASONE) 10 MG tablet    albuterol (PROVENTIL) (2.5 MG/3ML) 0.083% nebulizer solution   3. Abnormal biliary HIDA scan   She is advised to follow up with Dr. Tracy Betancourt for further management                    Completed Refills   Requested Prescriptions     Signed Prescriptions Disp Refills    levofloxacin (LEVAQUIN) 500 MG tablet 7 tablet 0     Sig: Take 1 tablet by mouth daily for 7 days    predniSONE (DELTASONE) 10 MG tablet 20 tablet 0     Si tabs po daily x 2 days, 3 tabs po daily x 2 days, 2 tabs po daily x 2 days, 1 tab po daily x 2 days    benzonatate (TESSALON) 100 MG capsule 30 capsule 0     Sig: Take 1 capsule by mouth 3 times daily as needed for Cough    albuterol (PROVENTIL) (2.5 MG/3ML) 0.083% nebulizer solution 120 each 2     Sig: Take 3 mLs by nebulization every 4 hours as needed for Wheezing     Return in about 6 months (around 2018) for HTN.   Orders Placed This Encounter   Medications    levofloxacin (LEVAQUIN) 500 MG tablet     Sig: Take 1 tablet by mouth daily for 7 days     Dispense:  7 tablet     Refill:  0    predniSONE (DELTASONE) 10 MG tablet     Si tabs po daily x 2 days, 3 tabs po daily x 2 days, 2 tabs po daily x 2 days, 1 tab po daily x 2 days     Dispense:  20 tablet     Refill:  0    benzonatate (TESSALON) 100 MG capsule

## 2017-11-28 ASSESSMENT — ENCOUNTER SYMPTOMS
COUGH: 0
SHORTNESS OF BREATH: 0
BLOOD IN STOOL: 0
CHOKING: 0
NAUSEA: 1
DIARRHEA: 1
TROUBLE SWALLOWING: 0
ABDOMINAL PAIN: 1
BACK PAIN: 0
SORE THROAT: 0
ABDOMINAL DISTENTION: 1
VOMITING: 0

## 2017-11-28 NOTE — PROGRESS NOTES
COLONOSCOPY  07/20/2015    Cecilia Nair MD    HYSTERECTOMY  2006    Vaginal hysterectomy dr Erin Christianson  07/20/2015    Cecilia Nair MD       Family History   Problem Relation Age of Onset    Diabetes Father        Allergies:  See list    Current Outpatient Prescriptions   Medication Sig Dispense Refill    diltiazem (CARDIZEM CD) 120 MG extended release capsule Take 1 capsule by mouth daily 30 capsule 11    omeprazole (PRILOSEC) 20 MG delayed release capsule Take 1 capsule by mouth daily 90 capsule 2    rivaroxaban (XARELTO) 20 MG TABS tablet Take 1 tablet by mouth daily (with breakfast) 90 tablet 2    levothyroxine (SYNTHROID) 50 MCG tablet Take 1 tablet by mouth Daily 90 tablet 2    levofloxacin (LEVAQUIN) 500 MG tablet Take 1 tablet by mouth daily for 7 days 7 tablet 0    predniSONE (DELTASONE) 10 MG tablet 4 tabs po daily x 2 days, 3 tabs po daily x 2 days, 2 tabs po daily x 2 days, 1 tab po daily x 2 days 20 tablet 0    benzonatate (TESSALON) 100 MG capsule Take 1 capsule by mouth 3 times daily as needed for Cough 30 capsule 0    albuterol (PROVENTIL) (2.5 MG/3ML) 0.083% nebulizer solution Take 3 mLs by nebulization every 4 hours as needed for Wheezing 120 each 2    ondansetron (ZOFRAN ODT) 4 MG disintegrating tablet Take 1 tablet by mouth every 8 hours as needed for Nausea or Vomiting 6 tablet 0    clobetasol (TEMOVATE) 0.05 % cream Daily for 2 wks than twice week 60 g 1     No current facility-administered medications for this visit.         Social History     Social History    Marital status:      Spouse name: N/A    Number of children: N/A    Years of education: N/A     Social History Main Topics    Smoking status: Never Smoker    Smokeless tobacco: Never Used    Alcohol use No    Drug use: No    Sexual activity: No     Other Topics Concern    None     Social History Narrative    None       Objective:   Physical Exam   Constitutional: She is oriented to person, place, and time. She appears well-developed and well-nourished. HENT:   Head: Normocephalic and atraumatic. Mouth/Throat: Oropharynx is clear and moist.   Eyes: Conjunctivae and EOM are normal. Pupils are equal, round, and reactive to light. No scleral icterus. Neck: Normal range of motion. Neck supple. No JVD present. No tracheal deviation present. Cardiovascular: Normal rate and regular rhythm. Pulmonary/Chest: Effort normal and breath sounds normal. No respiratory distress. She exhibits no tenderness. Abdominal: Soft. Bowel sounds are normal. She exhibits no distension and no mass. There is no tenderness. There is no rebound and no guarding. Musculoskeletal: Normal range of motion. She exhibits no edema. Lymphadenopathy:     She has no cervical adenopathy. Neurological: She is alert and oriented to person, place, and time. No cranial nerve deficit. Skin: Skin is warm and dry. No rash noted. No erythema. Psychiatric: She has a normal mood and affect. Her behavior is normal. Judgment and thought content normal.   Nursing note and vitals reviewed. MRI ABDOMEN W WO CONTRAST   Status: Final result   Order Providers     Authorizing Encounter Billing   Sav Menchaca MD [facility] MRI Magalie Navarro MD          Signed by     Signed Date/Time  Phone Pager   Judith Carreno 10/10/2017 15:37 [phone]    [facility]     Read Date Phone Pager   Judith Carreno Oct 10, 2017 [phone]    Reviewed By Keila Menchaca MD on 10/10/2017 19:55   Sav Menchaca MD on 10/10/2017 19:55   Routing History     Priority Sent On From To Message Type    10/10/2017  3:41 PM Suman, [facility] Incoming Radiant Results From Carlos Eduardo Carbajal MD Results   Narrative   CLINICAL HISTORY: Patient with bilateral renal cysts.  Six month follow-up.       EXAMINATION: MRI of the abdomen with and without contrast, 10/10/2017.       COMPARISON: MRI of the abdomen with and without contrast 12/14/2016 and CT    abdomen and pelvis 7/20/2016.           TECHNIQUE: Multisequence, multiaxial images with and without contrast of the    upper abdomen were obtained. 18 mL of ProHance was utilized as intravenous    contrast.           FINDINGS: There are multiple lesions involving both kidneys which seem to have    low signal on T1-weighted images except for a lesion in the mid pole posterior    aspect of the left kidney which has increased signal on T1 as well as    T2-weighted images. Most of these lesions on the heavily water weighted    sequences have increased signal. However, the lesion in the mid pole has    slightly low signal on T2-weighted images. This lesion measures 1.4 cm. Postcontrast, none of the lesions in the right kidney seem to be enhancing. There is no enhancement of the lesion in the posterior mid pole of the left    kidney as well as the lesion in the mid pole medially involving the left    kidney. The mid pole medial lesion measures 1.3 cm and has precontrast    Hounsfield units of approximately 97; however, postcontrast the Hounsfield    units are approximately 169 which could be due to partial voluming from the    underlying renal parenchyma. There is no enhancement of the lesion in the mid    pole which had a hyperdensity on T1 as well as T2-weighted images. The    remaining lesions do not enhance post contrast as well.       There is a low signal lesion in the anterior segment of the right hepatic lobe    measuring 1.0 cm and there is a lesion in the medial segment of the left    hepatic lobe which measures approximately 0.7 cm with low signal on T1 and    increased signal on T2-weighted images with no enhancement. Besides, the liver    has signal characteristics of a fatty liver. The gallbladder, visualized    portions of the spleen, pancreas and adrenal glands appear normal. The    abdominal aorta has a normal caliber.  There is no retroperitoneal

## 2017-12-14 RX ORDER — DILTIAZEM HYDROCHLORIDE 120 MG/1
120 CAPSULE, COATED, EXTENDED RELEASE ORAL DAILY
Qty: 30 CAPSULE | Refills: 11 | Status: SHIPPED | OUTPATIENT
Start: 2017-12-14 | End: 2018-09-27 | Stop reason: SDUPTHER

## 2017-12-19 ENCOUNTER — OFFICE VISIT (OUTPATIENT)
Dept: SURGERY | Age: 62
End: 2017-12-19
Payer: MEDICARE

## 2017-12-19 VITALS — BODY MASS INDEX: 30.7 KG/M2 | WEIGHT: 196 LBS

## 2017-12-19 DIAGNOSIS — K82.8 BILIARY DYSKINESIA: ICD-10-CM

## 2017-12-19 DIAGNOSIS — R10.9 CHRONIC ABDOMINAL PAIN: ICD-10-CM

## 2017-12-19 DIAGNOSIS — Z01.818 PREPROCEDURAL EXAMINATION: Primary | ICD-10-CM

## 2017-12-19 DIAGNOSIS — G89.29 CHRONIC ABDOMINAL PAIN: ICD-10-CM

## 2017-12-19 PROCEDURE — 99213 OFFICE O/P EST LOW 20 MIN: CPT | Performed by: SURGERY

## 2017-12-19 PROCEDURE — 3017F COLORECTAL CA SCREEN DOC REV: CPT | Performed by: SURGERY

## 2017-12-19 PROCEDURE — 1036F TOBACCO NON-USER: CPT | Performed by: SURGERY

## 2017-12-19 PROCEDURE — G8427 DOCREV CUR MEDS BY ELIG CLIN: HCPCS | Performed by: SURGERY

## 2017-12-19 PROCEDURE — G8484 FLU IMMUNIZE NO ADMIN: HCPCS | Performed by: SURGERY

## 2017-12-19 PROCEDURE — 3014F SCREEN MAMMO DOC REV: CPT | Performed by: SURGERY

## 2017-12-19 PROCEDURE — G8417 CALC BMI ABV UP PARAM F/U: HCPCS | Performed by: SURGERY

## 2018-01-30 ENCOUNTER — OFFICE VISIT (OUTPATIENT)
Dept: OBGYN CLINIC | Age: 63
End: 2018-01-30
Payer: MEDICARE

## 2018-01-30 ENCOUNTER — HOSPITAL ENCOUNTER (OUTPATIENT)
Age: 63
Setting detail: SPECIMEN
Discharge: HOME | End: 2018-01-30
Payer: MEDICARE

## 2018-01-30 VITALS
WEIGHT: 200 LBS | RESPIRATION RATE: 16 BRPM | HEART RATE: 80 BPM | SYSTOLIC BLOOD PRESSURE: 152 MMHG | BODY MASS INDEX: 31.32 KG/M2 | DIASTOLIC BLOOD PRESSURE: 77 MMHG

## 2018-01-30 DIAGNOSIS — G89.29 ABDOMINAL PAIN, CHRONIC, GENERALIZED: ICD-10-CM

## 2018-01-30 DIAGNOSIS — R10.84 ABDOMINAL PAIN, CHRONIC, GENERALIZED: Primary | ICD-10-CM

## 2018-01-30 DIAGNOSIS — N90.4 VULVAR DYSTROPHY: ICD-10-CM

## 2018-01-30 DIAGNOSIS — Z12.72 SCREENING FOR VAGINAL CANCER: ICD-10-CM

## 2018-01-30 DIAGNOSIS — Z12.31 ENCOUNTER FOR SCREENING MAMMOGRAM FOR BREAST CANCER: ICD-10-CM

## 2018-01-30 DIAGNOSIS — R10.84 ABDOMINAL PAIN, CHRONIC, GENERALIZED: ICD-10-CM

## 2018-01-30 DIAGNOSIS — G89.29 ABDOMINAL PAIN, CHRONIC, GENERALIZED: Primary | ICD-10-CM

## 2018-01-30 PROCEDURE — G0145 SCR C/V CYTO,THINLAYER,RESCR: HCPCS

## 2018-01-30 PROCEDURE — 99396 PREV VISIT EST AGE 40-64: CPT | Performed by: OBSTETRICS & GYNECOLOGY

## 2018-01-30 RX ORDER — DICYCLOMINE HYDROCHLORIDE 10 MG/1
10 CAPSULE ORAL 4 TIMES DAILY PRN
Qty: 120 CAPSULE | Refills: 3 | Status: SHIPPED | OUTPATIENT
Start: 2018-01-30 | End: 2018-10-02

## 2018-01-30 RX ORDER — DICYCLOMINE HYDROCHLORIDE 10 MG/1
10 CAPSULE ORAL 4 TIMES DAILY PRN
Qty: 120 CAPSULE | Refills: 3 | Status: SHIPPED | OUTPATIENT
Start: 2018-01-30 | End: 2018-01-30 | Stop reason: SDUPTHER

## 2018-01-30 RX ORDER — CLOBETASOL PROPIONATE 0.5 MG/G
CREAM TOPICAL
Qty: 60 G | Refills: 1 | Status: SHIPPED | OUTPATIENT
Start: 2018-01-30 | End: 2018-09-27 | Stop reason: SDUPTHER

## 2018-01-30 NOTE — PROGRESS NOTES
YEARLY PHYSICAL    Date of service: 2018    Naresh Lopez  Is a 57 y.o.   female    PT's PCP is: Tisha Chino MD     : 1955                                             Subjective:       No LMP recorded. Patient has had a hysterectomy. Are your menses regular: not applicable    OB History    Para Term  AB Living   5 5 5     5   SAB TAB Ectopic Molar Multiple Live Births             5      # Outcome Date GA Lbr Ernesto/2nd Weight Sex Delivery Anes PTL Lv   5 Term 91 40w0d  7 lb (3.175 kg) F Vag-Spont   ADA   4 Term 86 40w0d  10 lb (4.536 kg) M Vag-Spont   ADA   3 Term 77 40w0d  9 lb 9 oz (4.338 kg) M Vag-Spont   ADA   2 Term 78 40w0d  7 lb (3.175 kg) F Vag-Spont   ADA   1 Term 75 40w0d  10 lb (4.536 kg) M Vag-Spont   ADA           History   Smoking Status    Never Smoker   Smokeless Tobacco    Never Used        History   Alcohol Use No       Family History   Problem Relation Age of Onset    Diabetes Father        Any family history of breast or ovarian cancer: No    Any family history of blood clots: No      Allergies: Review of patient's allergies indicates no known allergies.       Current Outpatient Prescriptions:     diltiazem (CARDIZEM CD) 120 MG extended release capsule, Take 1 capsule by mouth daily, Disp: 30 capsule, Rfl: 11    albuterol (PROVENTIL) (2.5 MG/3ML) 0.083% nebulizer solution, Take 3 mLs by nebulization every 4 hours as needed for Wheezing, Disp: 120 each, Rfl: 2    omeprazole (PRILOSEC) 20 MG delayed release capsule, Take 1 capsule by mouth daily, Disp: 90 capsule, Rfl: 2    rivaroxaban (XARELTO) 20 MG TABS tablet, Take 1 tablet by mouth daily (with breakfast), Disp: 90 tablet, Rfl: 2    levothyroxine (SYNTHROID) 50 MCG tablet, Take 1 tablet by mouth Daily, Disp: 90 tablet, Rfl: 2    History   Sexual Activity    Sexual activity: No       Any bleeding or Inspection negative, No nipple retraction or dimpling, No nipple discharge or bleeding, No axillary or supraclavicular adenopathy, Normal to palpation without dominant masses   Pelvic Exam: GENITAL/URINARY:  External Genitalia:  General appearance; normal, Hair distribution; normal, Lesions absent  Vagina:  General appearance normal, Discharge absent, Lesions absent, Pelvic support normal, the patient has a benign-appearing vaginal polyp at about the 5:00 area midway into the vagina. This area was thoroughly sampled during Pap smear. Uterus:  Hystory of hysterectomy  [place]: Masses absent, Tenderness absent, Enlargement absent, Nodularity absent                                    Vaginal discharge: no vaginal discharge   Assessment/plan: Patient is never given a trial of an anti-spastic medicine like Bentyl so I will do this. Encouraged her to use fiber as well. The patient did also request a refill of the clobetasol which she has use on an intermittent basis for vulvar itching. If abdominal pain worsens or changes patient to follow up with general surgeon.

## 2018-01-31 ENCOUNTER — HOSPITAL ENCOUNTER (OUTPATIENT)
Dept: MAMMOGRAPHY | Age: 63
Discharge: HOME | End: 2018-02-02
Payer: MEDICARE

## 2018-01-31 DIAGNOSIS — Z12.31 ENCOUNTER FOR SCREENING MAMMOGRAM FOR BREAST CANCER: ICD-10-CM

## 2018-01-31 PROCEDURE — 77067 SCR MAMMO BI INCL CAD: CPT

## 2018-02-03 ASSESSMENT — ENCOUNTER SYMPTOMS
SORE THROAT: 0
TROUBLE SWALLOWING: 0
COUGH: 0
BACK PAIN: 0
ABDOMINAL PAIN: 1
DIARRHEA: 1
SHORTNESS OF BREATH: 0
CHOKING: 0
VOMITING: 0
ABDOMINAL DISTENTION: 1
BLOOD IN STOOL: 0
NAUSEA: 1

## 2018-02-04 NOTE — PROGRESS NOTES
Subjective:      Patient ID: Edmund Boss is a 57 y.o. female. HPI     Ms Mandy Duncan returns for follow up after HIDA scan. Markedly decreased EF of only 1% is noted. She is doing well. She is a 60 yo HF with a very long history of nonspecific abdominal pain.  Colonoscopy 2015 was normal.  She was found to have H pylori gastritis on EGD, and was appropriately treated.  Her epigastric symptoms have improved significantly.  She still has occasional nausea.  Now with new lower abdominal pain.  Daily BM's, formed brown stool, without blood.  Occasional diarrhea. Review of Systems   Constitutional: Negative for activity change, appetite change, chills, fever and unexpected weight change. HENT: Negative for nosebleeds, sneezing, sore throat and trouble swallowing. Eyes: Negative for visual disturbance. Respiratory: Negative for cough, choking and shortness of breath. Cardiovascular: Negative for chest pain, palpitations and leg swelling. Gastrointestinal: Positive for abdominal distention, abdominal pain, diarrhea and nausea. Negative for blood in stool and vomiting. Genitourinary: Negative for dysuria, flank pain and hematuria. Musculoskeletal: Positive for arthralgias. Negative for back pain, gait problem and myalgias. Allergic/Immunologic: Negative for immunocompromised state. Neurological: Negative for dizziness, seizures, syncope, weakness and headaches. Hematological: Does not bruise/bleed easily. Psychiatric/Behavioral: Negative for confusion and sleep disturbance.         Past Medical History:   Diagnosis Date    Arthritis     Asthma     Chickenpox     Hypertension     Hypothyroidism     Measles     Mumps     Osteoarthritis     Smallpox     Ulcer ([address]     Ulcer ([address]     Whooping cough        Past Surgical History:   Procedure Laterality Date    COLONOSCOPY  07/20/2015    Max Sarah MD    HYSTERECTOMY  2006    Vaginal hysterectomy dr Maddison Burns ENDOSCOPY  07/20/2015    Shaq Berger MD       Family History   Problem Relation Age of Onset    Diabetes Father        Allergies:  See list    Current Outpatient Prescriptions   Medication Sig Dispense Refill    diltiazem (CARDIZEM CD) 120 MG extended release capsule Take 1 capsule by mouth daily 30 capsule 11    albuterol (PROVENTIL) (2.5 MG/3ML) 0.083% nebulizer solution Take 3 mLs by nebulization every 4 hours as needed for Wheezing 120 each 2    omeprazole (PRILOSEC) 20 MG delayed release capsule Take 1 capsule by mouth daily 90 capsule 2    rivaroxaban (XARELTO) 20 MG TABS tablet Take 1 tablet by mouth daily (with breakfast) 90 tablet 2    levothyroxine (SYNTHROID) 50 MCG tablet Take 1 tablet by mouth Daily 90 tablet 2    clobetasol (TEMOVATE) 0.05 % cream Using a thin layer daily as needed for itching. Not to exceed 6 weeks of continuous use 60 g 1    dicyclomine (BENTYL) 10 MG capsule Take 1 capsule by mouth 4 times daily as needed (Abdominal pain) 120 capsule 3     No current facility-administered medications for this visit. Social History     Social History    Marital status:      Spouse name: N/A    Number of children: N/A    Years of education: N/A     Social History Main Topics    Smoking status: Never Smoker    Smokeless tobacco: Never Used    Alcohol use No    Drug use: No    Sexual activity: Yes     Partners: Male     Other Topics Concern    None     Social History Narrative    None       Objective:   Physical Exam   Constitutional: She is oriented to person, place, and time. She appears well-developed and well-nourished. HENT:   Head: Normocephalic and atraumatic. Mouth/Throat: Oropharynx is clear and moist.   Eyes: Conjunctivae and EOM are normal. Pupils are equal, round, and reactive to light. No scleral icterus. Neck: Normal range of motion. Neck supple. No JVD present. No tracheal deviation present. Cardiovascular: Normal rate and regular rhythm. Pulmonary/Chest: Effort normal and breath sounds normal. No respiratory distress. She exhibits no tenderness. Abdominal: Soft. Bowel sounds are normal. She exhibits no distension and no mass. There is no tenderness. There is no rebound and no guarding. Musculoskeletal: Normal range of motion. She exhibits no edema. Lymphadenopathy:     She has no cervical adenopathy. Neurological: She is alert and oriented to person, place, and time. No cranial nerve deficit. Skin: Skin is warm and dry. No rash noted. No erythema. Psychiatric: She has a normal mood and affect. Her behavior is normal. Judgment and thought content normal.   Nursing note and vitals reviewed. NM HEPATOBILIARY SCAN W PHARMACOLOGICAL INTERVENTION   Status: Final result   Order Providers     Authorizing Encounter Billing   Geraldine Drake [address]   Replaced:  NM HEPATOBILIARY          Signed by     Signed Date/Time  Phone Pager   Stefany Read 11/13/2017 14:16 [phone]    [facility]     Read Date Phone Pager   Stefany Read Nov 13, 2017 [phone]    Narrative   HEPATOBILIARY SCAN WITH EJECTION FRACTION:         HISTORY: Epigastric abdominal pain.        COMPARISON: MRI abdomen 10/10/2017.            TECHNIQUE: The patient was injected with 4.6 mCi technetium Choletec and    multiple images were performed. The patient then received 1.3 micrograms of    sincalide over 45 minutes and dynamic images were performed over 65 minutes. A    region of interest was placed around the gallbladder and a time activity curve    was generated. The gallbladder ejection fraction was calculated.            FINDINGS: Erenest [place] is normal uptake and excretion of the radiopharmaceutical by    the liver into the biliary system, and bowel. Following the infusion of    sincalide, the gallbladder contracted with an ejection fraction of 1%.  The    normal range is 40% or greater.

## 2018-02-04 NOTE — PATIENT INSTRUCTIONS
Patient Education        Colecistectomía: Antes de Tyra Vasquez - [ Cholecystectomy: Before Your Surgery ]  ¿Qué es micheal colecistectomía? La colecistectomía es un tipo de [place]. Sirve para extraer micheal vesícula biliar enferma. Esta operación se suele hacer jose roberto cirugía laparoscópica. El médico coloca un tubo con savanah y otros instrumentos quirúrgicos a través de pequeños butler (incisiones) en bansal abdomen. El tubo se llama laparoscopio. Le permite a bansal médico barry los [employer] con el fin de poder hacer la operación. Las incisiones usha cicatrices que se vuelven menos visibles con el tiempo. Dauna Duffy de las personas regresan a bansal hogar el mismo día. Es probable que cada día se sienta mejor. Después de 1 semana, la mayoría de [employer] tienen solo un poco dolor. Si usted trabaja en micheal oficina, es probable que pueda volver al trabajo en 1 o 2 semanas. Si en bansal trabajo debe levantar objetos pesados o tiene un trabajo muy [place], podría tardar hasta 4 semanas. En algunos casos, la cirugía abierta es la mejor opción. Bansal médico podría optar por la cirugía abierta por adelantado. O podría optar por johnny en mitad de micheal cirugía laparoscópica. En la [place], el médico hace micheal incisión más bibiana en la parte superior del abdomen. Si usted tiene [place], es probable que pase de 2 a 4 días en el hospital. Y podría tardar de 4 a 6 semanas en retomar bansal rutina habitual.  La atención de seguimiento es micheal parte clave de bansal tratamiento y seguridad. Asegúrese de hacer y acudir a todas las citas, y llame a bansal médico si está teniendo problemas. También es micheal buena idea saber los resultados de yeyo exámenes y mantener micheal lista de los medicamentos que blair. ¿Qué ocurre antes de la cirugía? ?La cirugía puede ser estresante. Esta información le ayudará a entender qué puede esperar. Y le ayudará a prepararse de Татьяна vasquez para bansal cirugía. ?Cómo prepararse para la cirugía  ?  · Entienda exactamente Sandie Mimes

## 2018-02-09 ENCOUNTER — OFFICE VISIT (OUTPATIENT)
Dept: FAMILY MEDICINE CLINIC | Age: 63
End: 2018-02-09
Payer: MEDICARE

## 2018-02-09 VITALS
HEART RATE: 72 BPM | SYSTOLIC BLOOD PRESSURE: 130 MMHG | WEIGHT: 200 LBS | RESPIRATION RATE: 18 BRPM | BODY MASS INDEX: 31.39 KG/M2 | DIASTOLIC BLOOD PRESSURE: 76 MMHG | HEIGHT: 67 IN

## 2018-02-09 DIAGNOSIS — B37.31 VAGINAL CANDIDIASIS: ICD-10-CM

## 2018-02-09 DIAGNOSIS — E03.9 HYPOTHYROIDISM, UNSPECIFIED TYPE: ICD-10-CM

## 2018-02-09 DIAGNOSIS — K59.00 CONSTIPATION, UNSPECIFIED CONSTIPATION TYPE: Primary | ICD-10-CM

## 2018-02-09 DIAGNOSIS — I48.91 ATRIAL FIBRILLATION, UNSPECIFIED TYPE (HCC): ICD-10-CM

## 2018-02-09 DIAGNOSIS — K64.9 HEMORRHOIDS, UNSPECIFIED HEMORRHOID TYPE: ICD-10-CM

## 2018-02-09 DIAGNOSIS — E03.9 ACQUIRED HYPOTHYROIDISM: ICD-10-CM

## 2018-02-09 LAB — CYTOLOGY REPORT: NORMAL

## 2018-02-09 PROCEDURE — 1036F TOBACCO NON-USER: CPT | Performed by: INTERNAL MEDICINE

## 2018-02-09 PROCEDURE — 3017F COLORECTAL CA SCREEN DOC REV: CPT | Performed by: INTERNAL MEDICINE

## 2018-02-09 PROCEDURE — G8484 FLU IMMUNIZE NO ADMIN: HCPCS | Performed by: INTERNAL MEDICINE

## 2018-02-09 PROCEDURE — 3014F SCREEN MAMMO DOC REV: CPT | Performed by: INTERNAL MEDICINE

## 2018-02-09 PROCEDURE — G8427 DOCREV CUR MEDS BY ELIG CLIN: HCPCS | Performed by: INTERNAL MEDICINE

## 2018-02-09 PROCEDURE — 99214 OFFICE O/P EST MOD 30 MIN: CPT | Performed by: INTERNAL MEDICINE

## 2018-02-09 PROCEDURE — G8417 CALC BMI ABV UP PARAM F/U: HCPCS | Performed by: INTERNAL MEDICINE

## 2018-02-09 RX ORDER — DOCUSATE SODIUM 100 MG/1
100 CAPSULE, LIQUID FILLED ORAL 2 TIMES DAILY
Qty: 60 CAPSULE | Refills: 1 | Status: SHIPPED | OUTPATIENT
Start: 2018-02-09 | End: 2018-03-26 | Stop reason: SDUPTHER

## 2018-02-09 RX ORDER — FLUCONAZOLE 150 MG/1
150 TABLET ORAL ONCE
Qty: 1 TABLET | Refills: 0 | Status: SHIPPED | OUTPATIENT
Start: 2018-02-09 | End: 2018-03-26 | Stop reason: SDUPTHER

## 2018-02-09 RX ORDER — LEVOTHYROXINE SODIUM 0.05 MG/1
50 TABLET ORAL DAILY
Qty: 90 TABLET | Refills: 2 | Status: SHIPPED | OUTPATIENT
Start: 2018-02-09 | End: 2018-09-27 | Stop reason: SDUPTHER

## 2018-02-09 RX ORDER — HYDROCORTISONE ACETATE 25 MG/1
25 SUPPOSITORY RECTAL 2 TIMES DAILY
Qty: 30 SUPPOSITORY | Refills: 1 | Status: ON HOLD | OUTPATIENT
Start: 2018-02-09 | End: 2020-01-15

## 2018-02-09 ASSESSMENT — ENCOUNTER SYMPTOMS
ABDOMINAL PAIN: 0
NAUSEA: 0
COUGH: 0
BLURRED VISION: 0
SORE THROAT: 0
HEARTBURN: 0
CONSTIPATION: 1
BLOATING: 0
BACK PAIN: 0
SHORTNESS OF BREATH: 0

## 2018-02-09 NOTE — PROGRESS NOTES
Positive for constipation. Negative for abdominal pain, anorexia, bloating, heartburn and nausea. Painful hemorrhoids   Genitourinary: Negative for difficulty urinating and dysuria. Vaginal itching and whitish discharge   Musculoskeletal: Negative for back pain. Skin: Negative for rash. Neurological: Negative for headaches. Psychiatric/Behavioral: Negative for depression. The patient is not nervous/anxious. Physical Exam:     Vitals:  /76 (Site: Left Arm, Position: Sitting, Cuff Size: Medium Adult)   Pulse 72   Resp 18   Ht 5' 7\" (1.702 m)   Wt 200 lb (90.7 kg)   BMI 31.32 kg/m²       Physical Exam   Constitutional: She is oriented to person, place, and time. She appears well-developed and well-nourished. No distress. HENT:   Head: Normocephalic and atraumatic. Neck: No thyromegaly present. Cardiovascular: Normal rate, regular rhythm and normal heart sounds. No murmur heard. Pulmonary/Chest: Effort normal and breath sounds normal. She has no wheezes. She has no rales. Abdominal: Soft. Bowel sounds are normal. She exhibits no distension and no mass. There is no tenderness. External hemorrhoids noted in the anal area, no ulcers, no mass   Musculoskeletal: Normal range of motion. She exhibits no edema. Lymphadenopathy:     She has no cervical adenopathy. Neurological: She is alert and oriented to person, place, and time. Skin: Skin is warm and dry. No rash noted. Psychiatric: She has a normal mood and affect. Her behavior is normal. Judgment normal.   Vitals reviewed.             Data:     Lab Results   Component Value Date     11/12/2017    K 4.3 11/12/2017     11/12/2017    CO2 27 11/12/2017    BUN 19 11/12/2017    CREATININE 0.65 11/12/2017    GLUCOSE 126 11/12/2017    PROT 8.4 11/12/2017    LABALBU 4.1 11/12/2017    BILITOT 0.53 11/12/2017    ALKPHOS 94 11/12/2017    AST 12 11/12/2017    ALT 15 11/12/2017     Lab Results   Component Value Date WBC 11.4 11/12/2017    RBC 5.36 11/12/2017    HGB 14.9 11/12/2017    HCT 46.1 11/12/2017    MCV 85.9 11/12/2017    MCH 27.9 11/12/2017    MCHC 32.5 11/12/2017    RDW 15.0 11/12/2017     11/12/2017    MPV NOT REPORTED 11/12/2017     Lab Results   Component Value Date    TSH 0.41 09/17/2017     Lab Results   Component Value Date    CHOL 147 07/14/2015    HDL 52 07/14/2015    LABA1C 6.0 09/25/2017          Assessment & Plan       1. Constipation, unspecified constipation type  Advised high fiber diet and drink adequate amount of fluids every day. Will prescribe colace    docusate sodium (COLACE) 100 MG capsule   2. Hemorrhoids, unspecified hemorrhoid type  Anusol supp hydrocortisone (ANUSOL-HC) 25 MG suppository   3. Vaginal candidiasis   Fluconazole [id number] fluconazole (DIFLUCAN) 150 MG tablet   4. Acquired hypothyroidism  levothyroxine (SYNTHROID) 50 MCG tablet   5. Atrial fibrillation, unspecified type (HCC)  rivaroxaban (XARELTO) 20 MG TABS tablet                   Completed Refills   Requested Prescriptions     Signed Prescriptions Disp Refills    docusate sodium (COLACE) 100 MG capsule 60 capsule 1     Sig: Take 1 capsule by mouth 2 times daily    hydrocortisone (ANUSOL-HC) 25 MG suppository 30 suppository 1     Sig: Place 1 suppository rectally 2 times daily    fluconazole (DIFLUCAN) 150 MG tablet 1 tablet 0     Sig: Take 1 tablet by mouth once for 1 dose    levothyroxine (SYNTHROID) 50 MCG tablet 90 tablet 2     Sig: Take 1 tablet by mouth Daily    rivaroxaban (XARELTO) 20 MG TABS tablet 90 tablet 2     Sig: Take 1 tablet by mouth daily (with breakfast)     No Follow-up on file.   Orders Placed This Encounter   Medications    docusate sodium (COLACE) 100 MG capsule     Sig: Take 1 capsule by mouth 2 times daily     Dispense:  60 capsule     Refill:  1    hydrocortisone (ANUSOL-HC) 25 MG suppository     Sig: Place 1 suppository rectally 2 times daily     Dispense:  30 suppository     Refill:  1

## 2018-03-17 ENCOUNTER — HOSPITAL ENCOUNTER (OUTPATIENT)
Age: 63
Discharge: HOME | End: 2018-03-17
Payer: MEDICARE

## 2018-03-17 DIAGNOSIS — E03.9 HYPOTHYROIDISM, UNSPECIFIED TYPE: ICD-10-CM

## 2018-03-17 LAB — TSH SERPL DL<=0.05 MIU/L-ACNC: 2.28 MIU/L (ref 0.3–5)

## 2018-03-17 PROCEDURE — 36415 COLL VENOUS BLD VENIPUNCTURE: CPT

## 2018-03-17 PROCEDURE — 84443 ASSAY THYROID STIM HORMONE: CPT

## 2018-03-26 ENCOUNTER — OFFICE VISIT (OUTPATIENT)
Dept: FAMILY MEDICINE CLINIC | Age: 63
End: 2018-03-26
Payer: MEDICARE

## 2018-03-26 VITALS
HEIGHT: 67 IN | DIASTOLIC BLOOD PRESSURE: 70 MMHG | SYSTOLIC BLOOD PRESSURE: 130 MMHG | WEIGHT: 196 LBS | HEART RATE: 68 BPM | RESPIRATION RATE: 18 BRPM | BODY MASS INDEX: 30.76 KG/M2

## 2018-03-26 DIAGNOSIS — B35.1 PAIN DUE TO ONYCHOMYCOSIS OF TOENAIL: ICD-10-CM

## 2018-03-26 DIAGNOSIS — M79.676 PAIN DUE TO ONYCHOMYCOSIS OF TOENAIL: ICD-10-CM

## 2018-03-26 DIAGNOSIS — R73.9 ELEVATED BLOOD SUGAR: ICD-10-CM

## 2018-03-26 DIAGNOSIS — I10 ESSENTIAL HYPERTENSION: Primary | ICD-10-CM

## 2018-03-26 DIAGNOSIS — K59.00 CONSTIPATION, UNSPECIFIED CONSTIPATION TYPE: ICD-10-CM

## 2018-03-26 DIAGNOSIS — K21.9 GERD WITHOUT ESOPHAGITIS: ICD-10-CM

## 2018-03-26 DIAGNOSIS — Z23 NEED FOR STREPTOCOCCUS PNEUMONIAE VACCINATION: ICD-10-CM

## 2018-03-26 DIAGNOSIS — B37.31 VAGINAL CANDIDIASIS: ICD-10-CM

## 2018-03-26 LAB — HBA1C MFR BLD: 5.9 %

## 2018-03-26 PROCEDURE — 3017F COLORECTAL CA SCREEN DOC REV: CPT | Performed by: INTERNAL MEDICINE

## 2018-03-26 PROCEDURE — 1036F TOBACCO NON-USER: CPT | Performed by: INTERNAL MEDICINE

## 2018-03-26 PROCEDURE — 99214 OFFICE O/P EST MOD 30 MIN: CPT | Performed by: INTERNAL MEDICINE

## 2018-03-26 PROCEDURE — G8484 FLU IMMUNIZE NO ADMIN: HCPCS | Performed by: INTERNAL MEDICINE

## 2018-03-26 PROCEDURE — G8417 CALC BMI ABV UP PARAM F/U: HCPCS | Performed by: INTERNAL MEDICINE

## 2018-03-26 PROCEDURE — 3014F SCREEN MAMMO DOC REV: CPT | Performed by: INTERNAL MEDICINE

## 2018-03-26 PROCEDURE — 90471 IMMUNIZATION ADMIN: CPT | Performed by: INTERNAL MEDICINE

## 2018-03-26 PROCEDURE — 90670 PCV13 VACCINE IM: CPT | Performed by: INTERNAL MEDICINE

## 2018-03-26 PROCEDURE — G8427 DOCREV CUR MEDS BY ELIG CLIN: HCPCS | Performed by: INTERNAL MEDICINE

## 2018-03-26 PROCEDURE — 83036 HEMOGLOBIN GLYCOSYLATED A1C: CPT | Performed by: INTERNAL MEDICINE

## 2018-03-26 RX ORDER — DOCUSATE SODIUM 100 MG/1
100 CAPSULE, LIQUID FILLED ORAL 2 TIMES DAILY
Qty: 60 CAPSULE | Refills: 1 | Status: SHIPPED | OUTPATIENT
Start: 2018-03-26 | End: 2020-01-15

## 2018-03-26 RX ORDER — OMEPRAZOLE 20 MG/1
20 CAPSULE, DELAYED RELEASE ORAL DAILY
Qty: 90 CAPSULE | Refills: 2 | Status: SHIPPED | OUTPATIENT
Start: 2018-03-26 | End: 2018-09-27 | Stop reason: SDUPTHER

## 2018-03-26 RX ORDER — FLUCONAZOLE 150 MG/1
150 TABLET ORAL ONCE
Qty: 1 TABLET | Refills: 0 | Status: SHIPPED | OUTPATIENT
Start: 2018-03-26 | End: 2018-03-26

## 2018-03-26 ASSESSMENT — ENCOUNTER SYMPTOMS
SHORTNESS OF BREATH: 0
CONSTIPATION: 1
BLURRED VISION: 0
COUGH: 0
BLOOD IN STOOL: 0
BACK PAIN: 0
ABDOMINAL PAIN: 0
HEARTBURN: 0
SORE THROAT: 0
NAUSEA: 0
VOMITING: 0

## 2018-03-26 NOTE — PROGRESS NOTES
HPI Notes    Name: Ian Meng  : 1955         Chief Complaint:     Chief Complaint   Patient presents with    Hypertension     6 month follow up    Vaginal Itching    Nail Problem    Constipation       History of Present Illness:        Phylicia Gomez is here to follow up for HTN , prediabetes, constipation    She also c/o vaginal itching and burning. Says had Diflucan last month for the same problem and it took care of the symptoms. Now having recurrence, says it started burning and itching again, has whitish discharge. Wants to repeat the Anbx    Phylicia Gomez also c/o fungal infection in her toenails. Says great toes sometimes hurt. She used Penlac soln. Un the past and it helped to slow down the infection      Hypertension   This is a chronic problem. The current episode started more than 1 year ago. The problem is unchanged. The problem is controlled. Pertinent negatives include no blurred vision, chest pain, headaches, palpitations or shortness of breath. There are no associated agents to hypertension. Risk factors for coronary artery disease include obesity, post-menopausal state and family history. Past treatments include calcium channel blockers. The current treatment provides significant improvement. There are no compliance problems. There is no history of angina, kidney disease, CAD/MI, CVA, heart failure or PVD. There is no history of chronic renal disease. Constipation   This is a chronic problem. The current episode started more than 1 year ago. The problem has been resolved since onset. Her stool frequency is 1 time per day. The patient is on a high fiber diet. She does not exercise regularly. There has been adequate water intake. Pertinent negatives include no abdominal pain, anorexia, back pain, fever, nausea, vomiting or weight loss. Risk factors include obesity. Treatments tried: colace. The treatment provided significant relief.  There is no history of abdominal surgery, irritable bowel normal.   Left Ear: External ear normal.   Neck: No thyromegaly present. Cardiovascular: Normal rate, regular rhythm and normal heart sounds. No murmur heard. Pulmonary/Chest: Effort normal and breath sounds normal. She has no wheezes. She has no rales. Abdominal: Soft. Bowel sounds are normal. She exhibits no distension and no mass. There is no tenderness. Musculoskeletal: Normal range of motion. She exhibits no edema or tenderness. Lymphadenopathy:     She has no cervical adenopathy. Neurological: She is alert and oriented to person, place, and time. Skin: Skin is warm and dry. No rash noted. No erythema. Toenails thickened, yellowish discoloration, pitting   Psychiatric: She has a normal mood and affect. Her behavior is normal. Judgment normal.   Vitals reviewed. Data:     Lab Results   Component Value Date     11/12/2017    K 4.3 11/12/2017     11/12/2017    CO2 27 11/12/2017    BUN 19 11/12/2017    CREATININE 0.65 11/12/2017    GLUCOSE 126 11/12/2017    PROT 8.4 11/12/2017    LABALBU 4.1 11/12/2017    BILITOT 0.53 11/12/2017    ALKPHOS 94 11/12/2017    AST 12 11/12/2017    ALT 15 11/12/2017     Lab Results   Component Value Date    WBC 11.4 11/12/2017    RBC 5.36 11/12/2017    HGB 14.9 11/12/2017    HCT 46.1 11/12/2017    MCV 85.9 11/12/2017    MCH 27.9 11/12/2017    MCHC 32.5 11/12/2017    RDW 15.0 11/12/2017     11/12/2017    MPV NOT REPORTED 11/12/2017     Lab Results   Component Value Date    TSH 2.28 03/17/2018     Lab Results   Component Value Date    CHOL 147 07/14/2015    HDL 52 07/14/2015    LABA1C 5.9 03/26/2018          Assessment & Plan       1. Essential hypertension   BP well controlled on current meds, continue the same    2. Elevated blood sugar   HbA1C 5.9%, low carb diet and weight loss advised. POCT glycosylated hemoglobin (Hb A1C)   3.  Constipation, unspecified constipation type   Resolved with Colace, high fiber diet recommended docusate sodium (COLACE) 100 MG capsule   4. Pain due to onychomycosis of toenail   Will Reorder Penlac soln , advised to apply nightly ciclopirox (PENLAC) 8 % solution   5. GERD without esophagitis  omeprazole (PRILOSEC) 20 MG delayed release capsule   6. Need for Streptococcus pneumoniae vaccination  PREVNAR 13 IM (Pneumococcal conjugate vaccine 13-valent)   7. Vaginal candidiasis  Will order Diflucan 150 mg x 1  fluconazole (DIFLUCAN) 150 MG tablet                   Completed Refills   Requested Prescriptions     Signed Prescriptions Disp Refills    docusate sodium (COLACE) 100 MG capsule 60 capsule 1     Sig: Take 1 capsule by mouth 2 times daily    ciclopirox (PENLAC) 8 % solution 6.6 mL 2     Sig: Apply topically nightly.  omeprazole (PRILOSEC) 20 MG delayed release capsule 90 capsule 2     Sig: Take 1 capsule by mouth daily    fluconazole (DIFLUCAN) 150 MG tablet 1 tablet 0     Sig: Take 1 tablet by mouth once for 1 dose     Return in about 6 months (around 9/26/2018) for HTN. Orders Placed This Encounter   Medications    docusate sodium (COLACE) 100 MG capsule     Sig: Take 1 capsule by mouth 2 times daily     Dispense:  60 capsule     Refill:  1    ciclopirox (PENLAC) 8 % solution     Sig: Apply topically nightly. Dispense:  6.6 mL     Refill:  2    omeprazole (PRILOSEC) 20 MG delayed release capsule     Sig: Take 1 capsule by mouth daily     Dispense:  90 capsule     Refill:  2    fluconazole (DIFLUCAN) 150 MG tablet     Sig: Take 1 tablet by mouth once for 1 dose     Dispense:  1 tablet     Refill:  0     Orders Placed This Encounter   Procedures    PREVNAR 13 IM (Pneumococcal conjugate vaccine 13-valent)    POCT glycosylated hemoglobin (Hb A1C)         There are no Patient Instructions on file for this visit.     Electronically signed by Steve Victor MD on 3/26/2018 at 10:12 AM           Completed Refills   Requested Prescriptions     Signed Prescriptions Disp Refills    docusate sodium (COLACE) 100 MG capsule 60 capsule 1     Sig: Take 1 capsule by mouth 2 times daily    ciclopirox (PENLAC) 8 % solution 6.6 mL 2     Sig: Apply topically nightly.     omeprazole (PRILOSEC) 20 MG delayed release capsule 90 capsule 2     Sig: Take 1 capsule by mouth daily    fluconazole (DIFLUCAN) 150 MG tablet 1 tablet 0     Sig: Take 1 tablet by mouth once for 1 dose

## 2018-03-26 NOTE — PROGRESS NOTES
After obtaining consent, and per orders of Dr. Markie Ayala, injection of Prevnar 13 given in Left deltoid by Sriram Oliver. Patient instructed to remain in clinic for 20 minutes afterwards, and to report any adverse reaction to me immediately.

## 2018-09-27 ENCOUNTER — OFFICE VISIT (OUTPATIENT)
Dept: FAMILY MEDICINE CLINIC | Age: 63
End: 2018-09-27
Payer: MEDICAID

## 2018-09-27 VITALS
RESPIRATION RATE: 18 BRPM | SYSTOLIC BLOOD PRESSURE: 130 MMHG | WEIGHT: 202 LBS | HEART RATE: 72 BPM | DIASTOLIC BLOOD PRESSURE: 80 MMHG | HEIGHT: 67 IN | BODY MASS INDEX: 31.71 KG/M2

## 2018-09-27 DIAGNOSIS — I10 ESSENTIAL HYPERTENSION: Primary | ICD-10-CM

## 2018-09-27 DIAGNOSIS — K21.9 GERD WITHOUT ESOPHAGITIS: ICD-10-CM

## 2018-09-27 DIAGNOSIS — I48.0 PAROXYSMAL ATRIAL FIBRILLATION (HCC): ICD-10-CM

## 2018-09-27 DIAGNOSIS — N90.4 VULVAR DYSTROPHY: ICD-10-CM

## 2018-09-27 DIAGNOSIS — E03.9 ACQUIRED HYPOTHYROIDISM: ICD-10-CM

## 2018-09-27 DIAGNOSIS — Z91.81 AT HIGH RISK FOR FALLS: ICD-10-CM

## 2018-09-27 PROCEDURE — 99214 OFFICE O/P EST MOD 30 MIN: CPT | Performed by: INTERNAL MEDICINE

## 2018-09-27 RX ORDER — DILTIAZEM HYDROCHLORIDE 120 MG/1
120 CAPSULE, COATED, EXTENDED RELEASE ORAL DAILY
Qty: 30 CAPSULE | Refills: 5 | Status: SHIPPED | OUTPATIENT
Start: 2018-09-27 | End: 2019-04-21 | Stop reason: SDUPTHER

## 2018-09-27 RX ORDER — OMEPRAZOLE 20 MG/1
20 CAPSULE, DELAYED RELEASE ORAL DAILY
Qty: 90 CAPSULE | Refills: 1 | Status: SHIPPED | OUTPATIENT
Start: 2018-09-27 | End: 2019-01-07 | Stop reason: SDUPTHER

## 2018-09-27 RX ORDER — LEVOTHYROXINE SODIUM 0.05 MG/1
50 TABLET ORAL DAILY
Qty: 90 TABLET | Refills: 1 | Status: SHIPPED | OUTPATIENT
Start: 2018-09-27 | End: 2019-01-07 | Stop reason: SDUPTHER

## 2018-09-27 RX ORDER — CLOBETASOL PROPIONATE 0.5 MG/G
CREAM TOPICAL
Qty: 60 G | Refills: 1 | Status: SHIPPED | OUTPATIENT
Start: 2018-09-27 | End: 2018-11-01 | Stop reason: SDUPTHER

## 2018-09-27 ASSESSMENT — ENCOUNTER SYMPTOMS
BELCHING: 0
BLURRED VISION: 0
COUGH: 0
HEARTBURN: 0
SORE THROAT: 0
NAUSEA: 0
SHORTNESS OF BREATH: 0
ABDOMINAL PAIN: 1

## 2018-09-27 ASSESSMENT — PATIENT HEALTH QUESTIONNAIRE - PHQ9
2. FEELING DOWN, DEPRESSED OR HOPELESS: 0
SUM OF ALL RESPONSES TO PHQ QUESTIONS 1-9: 0
1. LITTLE INTEREST OR PLEASURE IN DOING THINGS: 0
SUM OF ALL RESPONSES TO PHQ9 QUESTIONS 1 & 2: 0
SUM OF ALL RESPONSES TO PHQ QUESTIONS 1-9: 0

## 2018-09-27 NOTE — PROGRESS NOTES
certain foods. Associated symptoms include fatigue. Pertinent negatives include no melena or weight loss. Risk factors include obesity. She has tried a PPI for the symptoms. The treatment provided significant relief. Past procedures include an EGD. Past Medical History:     Past Medical History:   Diagnosis Date    Arthritis     Asthma     Chickenpox     Hypertension     Hypothyroidism     Measles     Mumps     Osteoarthritis     Smallpox     Ulcer ([address]     Ulcer ([address]     Whooping cough       Reviewed all health maintenance requirements and ordered appropriate tests  Health Maintenance Due   Topic Date Due    DTaP/Tdap/Td vaccine (1 - Tdap) 05/27/1972    Shingles Vaccine (1 of 2 - 2 Dose Series) 05/27/2003    DEXA (modify frequency per FRAX score)  05/27/2018    Flu vaccine (1) 09/01/2018       Past Surgical History:     Past Surgical History:   Procedure Laterality Date    COLONOSCOPY  07/20/2015    Josefa Ruiz MD    HYSTERECTOMY  2006    Vaginal hysterectomy dr Fermin Portillo  07/20/2015    Josefa Ruiz MD        Medications:       Prior to Admission medications    Medication Sig Start Date End Date Taking? Authorizing Provider   omeprazole (PRILOSEC) 20 MG delayed release capsule Take 1 capsule by mouth daily 9/27/18  Yes Gisele Espinoza MD   levothyroxine (SYNTHROID) 50 MCG tablet Take 1 tablet by mouth Daily 9/27/18  Yes Gisele Espinoza MD   clobetasol (TEMOVATE) 0.05 % cream Using a thin layer daily as needed for itching. Not to exceed 6 weeks of continuous use 9/27/18  Yes Gisele Espinoza MD   diltiazem (CARDIZEM CD) 120 MG extended release capsule Take 1 capsule by mouth daily 9/27/18  Yes Gisele Espinoza MD   docusate sodium (COLACE) 100 MG capsule Take 1 capsule by mouth 2 times daily 3/26/18  Yes Gisele Espinoza MD   ciclopirox (PENLAC) 8 % solution Apply topically nightly.  3/26/18  Yes Gisele Espinoza MD   hydrocortisone (ANUSOL-HC) 25 MG suppository Place 1 suppository rectally 2 times daily 2/9/18  Yes Travis Salinas MD   rivaroxaban (XARELTO) 20 MG TABS tablet Take 1 tablet by mouth daily (with breakfast) 2/9/18  Yes Travis Salinas MD   dicyclomine (BENTYL) 10 MG capsule Take 1 capsule by mouth 4 times daily as needed (Abdominal pain) 1/30/18  Yes Leander Chiang MD   albuterol (PROVENTIL) (2.5 MG/3ML) 0.083% nebulizer solution Take 3 mLs by nebulization every 4 hours as needed for Wheezing 11/27/17  Yes Travis Salinas MD        Allergies:       Patient has no known allergies. Social History:     Tobacco:    reports that she has never smoked. She has never used smokeless tobacco.  Alcohol:      reports that she does not drink alcohol. Drug Use:  reports that she does not use drugs. Family History:     Family History   Problem Relation Age of Onset    Diabetes Father        Review of Systems:         Review of Systems   Constitutional: Positive for fatigue. Negative for chills, fever and weight loss. HENT: Negative for congestion and sore throat. Eyes: Negative for blurred vision. Respiratory: Negative for cough and shortness of breath. Cardiovascular: Negative for chest pain and palpitations. Gastrointestinal: Positive for abdominal pain. Negative for heartburn, melena and nausea. Genitourinary: Negative for dysuria. Skin: Negative for rash. Neurological: Negative for headaches. Psychiatric/Behavioral: Negative for depression. The patient is not nervous/anxious. Physical Exam:     Vitals:  /80 (Site: Left Upper Arm, Position: Sitting, Cuff Size: Medium Adult)   Pulse 72   Resp 18   Ht 5' 7\" (1.702 m)   Wt 202 lb (91.6 kg)   BMI 31.64 kg/m²       Physical Exam   Constitutional: She is oriented to person, place, and time. She appears well-developed and well-nourished. No distress. HENT:   Head: Normocephalic and atraumatic.    Right Ear: External ear normal.   Left Ear: External ear normal. capsule   3. Acquired hypothyroidism   Last TSH normal on 3/17/18, continue on current dose of Synthroid levothyroxine (SYNTHROID) 50 MCG tablet   4. Vulvar dystrophy  clobetasol (TEMOVATE) 0.05 % cream   5. Paroxysmal atrial fibrillation (HCC)   Stable on Gardizem continue on Xarelto diltiazem (CARDIZEM CD) 120 MG extended release capsule                   Completed Refills   Requested Prescriptions     Signed Prescriptions Disp Refills    omeprazole (PRILOSEC) 20 MG delayed release capsule 90 capsule 1     Sig: Take 1 capsule by mouth daily    levothyroxine (SYNTHROID) 50 MCG tablet 90 tablet 1     Sig: Take 1 tablet by mouth Daily    clobetasol (TEMOVATE) 0.05 % cream 60 g 1     Sig: Using a thin layer daily as needed for itching. Not to exceed 6 weeks of continuous use    diltiazem (CARDIZEM CD) 120 MG extended release capsule 30 capsule 5     Sig: Take 1 capsule by mouth daily     No Follow-up on file. Orders Placed This Encounter   Medications    omeprazole (PRILOSEC) 20 MG delayed release capsule     Sig: Take 1 capsule by mouth daily     Dispense:  90 capsule     Refill:  1    levothyroxine (SYNTHROID) 50 MCG tablet     Sig: Take 1 tablet by mouth Daily     Dispense:  90 tablet     Refill:  1    clobetasol (TEMOVATE) 0.05 % cream     Sig: Using a thin layer daily as needed for itching. Not to exceed 6 weeks of continuous use     Dispense:  60 g     Refill:  1    diltiazem (CARDIZEM CD) 120 MG extended release capsule     Sig: Take 1 capsule by mouth daily     Dispense:  30 capsule     Refill:  5     Orders Placed This Encounter   Procedures    Basic Metabolic Panel     Standing Status:   Future     Standing Expiration Date:   9/27/2019         Patient Instructions     SURVEY:    You may be receiving a survey from [employer] regarding your visit today. Please complete the survey to enable us to provide the highest quality of care to you and your family.     If you cannot score us a very good on any question, please call the office to discuss how we could of made your experience a very good one. Thank you. Electronically signed by Brandie Cruz MD on 9/27/2018 at 8:43 AM           Completed Refills   Requested Prescriptions     Signed Prescriptions Disp Refills    omeprazole (PRILOSEC) 20 MG delayed release capsule 90 capsule 1     Sig: Take 1 capsule by mouth daily    levothyroxine (SYNTHROID) 50 MCG tablet 90 tablet 1     Sig: Take 1 tablet by mouth Daily    clobetasol (TEMOVATE) 0.05 % cream 60 g 1     Sig: Using a thin layer daily as needed for itching. Not to exceed 6 weeks of continuous use    diltiazem (CARDIZEM CD) 120 MG extended release capsule 30 capsule 5     Sig: Take 1 capsule by mouth daily             On the basis of positive falls risk screening, assessment and plan is as follows: accidental fall, no further interventions at this time.

## 2018-09-28 ENCOUNTER — HOSPITAL ENCOUNTER (OUTPATIENT)
Age: 63
Discharge: HOME | End: 2018-09-28
Payer: MEDICAID

## 2018-09-28 DIAGNOSIS — I48.0 PAROXYSMAL ATRIAL FIBRILLATION (HCC): ICD-10-CM

## 2018-09-28 DIAGNOSIS — E03.9 ACQUIRED HYPOTHYROIDISM: ICD-10-CM

## 2018-09-28 LAB
ABSOLUTE EOS #: 0 K/UL (ref 0–0.4)
ABSOLUTE IMMATURE GRANULOCYTE: NORMAL K/UL (ref 0–0.3)
ABSOLUTE LYMPH #: 3 K/UL (ref 1–4.8)
ABSOLUTE MONO #: 0.8 K/UL (ref 0–1)
ALBUMIN SERPL-MCNC: 4.3 G/DL (ref 3.5–5.2)
ALBUMIN/GLOBULIN RATIO: ABNORMAL (ref 1–2.5)
ALP BLD-CCNC: 114 U/L (ref 35–104)
ALT SERPL-CCNC: 30 U/L (ref 5–33)
ANION GAP SERPL CALCULATED.3IONS-SCNC: 8 MMOL/L (ref 9–17)
AST SERPL-CCNC: 14 U/L
BASOPHILS # BLD: 1 % (ref 0–2)
BASOPHILS ABSOLUTE: 0 K/UL (ref 0–0.2)
BILIRUB SERPL-MCNC: 0.45 MG/DL (ref 0.3–1.2)
BUN BLDV-MCNC: 22 MG/DL (ref 8–23)
BUN/CREAT BLD: 30 (ref 9–20)
CALCIUM SERPL-MCNC: 9.7 MG/DL (ref 8.6–10.4)
CHLORIDE BLD-SCNC: 102 MMOL/L (ref 98–107)
CHOLESTEROL/HDL RATIO: 2.9
CHOLESTEROL: 190 MG/DL
CO2: 31 MMOL/L (ref 20–31)
CREAT SERPL-MCNC: 0.74 MG/DL (ref 0.5–0.9)
DIFFERENTIAL TYPE: YES
EKG ATRIAL RATE: 58 BPM
EKG P AXIS: 57 DEGREES
EKG P-R INTERVAL: 158 MS
EKG Q-T INTERVAL: 426 MS
EKG QRS DURATION: 112 MS
EKG QTC CALCULATION (BAZETT): 418 MS
EKG R AXIS: 50 DEGREES
EKG T AXIS: 60 DEGREES
EKG VENTRICULAR RATE: 58 BPM
EOSINOPHILS RELATIVE PERCENT: 0 % (ref 0–5)
GFR AFRICAN AMERICAN: >60 ML/MIN
GFR NON-AFRICAN AMERICAN: >60 ML/MIN
GFR SERPL CREATININE-BSD FRML MDRD: ABNORMAL ML/MIN/{1.73_M2}
GFR SERPL CREATININE-BSD FRML MDRD: ABNORMAL ML/MIN/{1.73_M2}
GLUCOSE BLD-MCNC: 91 MG/DL (ref 70–99)
HCT VFR BLD CALC: 43.6 % (ref 36–46)
HDLC SERPL-MCNC: 65 MG/DL
HEMOGLOBIN: 14.1 G/DL (ref 12–16)
IMMATURE GRANULOCYTES: NORMAL %
LDL CHOLESTEROL: 95 MG/DL (ref 0–130)
LYMPHOCYTES # BLD: 31 % (ref 15–40)
MAGNESIUM: 2.3 MG/DL (ref 1.6–2.6)
MCH RBC QN AUTO: 27.5 PG (ref 26–34)
MCHC RBC AUTO-ENTMCNC: 32.3 G/DL (ref 31–37)
MCV RBC AUTO: 85 FL (ref 80–100)
MONOCYTES # BLD: 8 % (ref 4–8)
NRBC AUTOMATED: NORMAL PER 100 WBC
PATIENT FASTING?: YES
PDW BLD-RTO: 14.9 % (ref 12.1–15.2)
PLATELET # BLD: 318 K/UL (ref 140–450)
PLATELET ESTIMATE: NORMAL
PMV BLD AUTO: NORMAL FL (ref 6–12)
POTASSIUM SERPL-SCNC: 3.9 MMOL/L (ref 3.7–5.3)
RBC # BLD: 5.13 M/UL (ref 4–5.2)
RBC # BLD: NORMAL 10*6/UL
SEG NEUTROPHILS: 60 % (ref 47–75)
SEGMENTED NEUTROPHILS ABSOLUTE COUNT: 5.8 K/UL (ref 2.5–7)
SODIUM BLD-SCNC: 141 MMOL/L (ref 135–144)
TOTAL PROTEIN: 8.4 G/DL (ref 6.4–8.3)
TRIGL SERPL-MCNC: 152 MG/DL
TSH SERPL DL<=0.05 MIU/L-ACNC: 2.89 MIU/L (ref 0.3–5)
VITAMIN D 25-HYDROXY: 24.2 NG/ML (ref 30–100)
VLDLC SERPL CALC-MCNC: ABNORMAL MG/DL (ref 1–30)
WBC # BLD: 9.6 K/UL (ref 3.5–11)
WBC # BLD: NORMAL 10*3/UL

## 2018-09-28 PROCEDURE — 82306 VITAMIN D 25 HYDROXY: CPT

## 2018-09-28 PROCEDURE — 80061 LIPID PANEL: CPT

## 2018-09-28 PROCEDURE — 80053 COMPREHEN METABOLIC PANEL: CPT

## 2018-09-28 PROCEDURE — 84443 ASSAY THYROID STIM HORMONE: CPT

## 2018-09-28 PROCEDURE — 85025 COMPLETE CBC W/AUTO DIFF WBC: CPT

## 2018-09-28 PROCEDURE — 36415 COLL VENOUS BLD VENIPUNCTURE: CPT

## 2018-09-28 PROCEDURE — 83735 ASSAY OF MAGNESIUM: CPT

## 2018-09-28 PROCEDURE — 93005 ELECTROCARDIOGRAM TRACING: CPT

## 2018-10-02 ENCOUNTER — OFFICE VISIT (OUTPATIENT)
Dept: CARDIOLOGY CLINIC | Age: 63
End: 2018-10-02
Payer: MEDICARE

## 2018-10-02 VITALS — SYSTOLIC BLOOD PRESSURE: 130 MMHG | DIASTOLIC BLOOD PRESSURE: 76 MMHG | BODY MASS INDEX: 31.48 KG/M2 | WEIGHT: 201 LBS

## 2018-10-02 DIAGNOSIS — I48.0 PAROXYSMAL ATRIAL FIBRILLATION (HCC): Primary | ICD-10-CM

## 2018-10-02 DIAGNOSIS — E03.9 ACQUIRED HYPOTHYROIDISM: ICD-10-CM

## 2018-10-02 DIAGNOSIS — E55.9 VITAMIN D DEFICIENCY DISEASE: ICD-10-CM

## 2018-10-02 PROCEDURE — 99214 OFFICE O/P EST MOD 30 MIN: CPT | Performed by: INTERNAL MEDICINE

## 2018-10-02 PROCEDURE — G8400 PT W/DXA NO RESULTS DOC: HCPCS | Performed by: INTERNAL MEDICINE

## 2018-10-02 PROCEDURE — 1123F ACP DISCUSS/DSCN MKR DOCD: CPT | Performed by: INTERNAL MEDICINE

## 2018-10-02 PROCEDURE — G8417 CALC BMI ABV UP PARAM F/U: HCPCS | Performed by: INTERNAL MEDICINE

## 2018-10-02 PROCEDURE — 4040F PNEUMOC VAC/ADMIN/RCVD: CPT | Performed by: INTERNAL MEDICINE

## 2018-10-02 PROCEDURE — 1090F PRES/ABSN URINE INCON ASSESS: CPT | Performed by: INTERNAL MEDICINE

## 2018-10-02 PROCEDURE — 1101F PT FALLS ASSESS-DOCD LE1/YR: CPT | Performed by: INTERNAL MEDICINE

## 2018-10-02 PROCEDURE — G8428 CUR MEDS NOT DOCUMENT: HCPCS | Performed by: INTERNAL MEDICINE

## 2018-10-02 PROCEDURE — 1036F TOBACCO NON-USER: CPT | Performed by: INTERNAL MEDICINE

## 2018-10-02 PROCEDURE — 3017F COLORECTAL CA SCREEN DOC REV: CPT | Performed by: INTERNAL MEDICINE

## 2018-10-02 PROCEDURE — G8484 FLU IMMUNIZE NO ADMIN: HCPCS | Performed by: INTERNAL MEDICINE

## 2018-10-02 RX ORDER — MULTIVIT-MIN/IRON/FOLIC ACID/K 18-600-40
4000 CAPSULE ORAL DAILY
COMMUNITY
End: 2019-09-24 | Stop reason: ALTCHOICE

## 2018-10-02 RX ORDER — BENZONATATE 100 MG/1
100 CAPSULE ORAL 3 TIMES DAILY PRN
Status: ON HOLD | COMMUNITY
End: 2019-03-04 | Stop reason: ALTCHOICE

## 2018-11-01 DIAGNOSIS — N90.4 VULVAR DYSTROPHY: ICD-10-CM

## 2018-11-01 RX ORDER — CLOBETASOL PROPIONATE 0.5 MG/G
CREAM TOPICAL
Qty: 60 G | Refills: 3 | Status: SHIPPED | OUTPATIENT
Start: 2018-11-01 | End: 2018-11-15

## 2018-11-01 NOTE — TELEPHONE ENCOUNTER
onychomycosis of toenail     Pneumonia of right lower lobe due to infectious organism [facility])     Prediabetes     Paroxysmal atrial fibrillation ([facility]

## 2018-11-15 RX ORDER — TRIAMCINOLONE ACETONIDE 1 MG/G
CREAM TOPICAL
Qty: 80 G | Refills: 2 | Status: SHIPPED | OUTPATIENT
Start: 2018-11-15 | End: 2019-02-05 | Stop reason: SDUPTHER

## 2018-11-27 ENCOUNTER — HOSPITAL ENCOUNTER (OUTPATIENT)
Age: 63
Discharge: HOME | End: 2018-11-27
Payer: MEDICARE

## 2018-11-27 DIAGNOSIS — I10 ESSENTIAL HYPERTENSION: ICD-10-CM

## 2018-11-27 LAB
ANION GAP SERPL CALCULATED.3IONS-SCNC: 7 MMOL/L (ref 9–17)
BUN BLDV-MCNC: 21 MG/DL (ref 8–23)
BUN/CREAT BLD: 29 (ref 9–20)
CALCIUM SERPL-MCNC: 9.7 MG/DL (ref 8.6–10.4)
CHLORIDE BLD-SCNC: 101 MMOL/L (ref 98–107)
CO2: 30 MMOL/L (ref 20–31)
CREAT SERPL-MCNC: 0.73 MG/DL (ref 0.5–0.9)
GFR AFRICAN AMERICAN: >60 ML/MIN
GFR NON-AFRICAN AMERICAN: >60 ML/MIN
GFR SERPL CREATININE-BSD FRML MDRD: ABNORMAL ML/MIN/{1.73_M2}
GFR SERPL CREATININE-BSD FRML MDRD: ABNORMAL ML/MIN/{1.73_M2}
GLUCOSE BLD-MCNC: 99 MG/DL (ref 70–99)
POTASSIUM SERPL-SCNC: 4.4 MMOL/L (ref 3.7–5.3)
SODIUM BLD-SCNC: 138 MMOL/L (ref 135–144)

## 2018-11-27 PROCEDURE — 36415 COLL VENOUS BLD VENIPUNCTURE: CPT

## 2018-11-27 PROCEDURE — 80048 BASIC METABOLIC PNL TOTAL CA: CPT

## 2019-01-07 ENCOUNTER — OFFICE VISIT (OUTPATIENT)
Dept: FAMILY MEDICINE CLINIC | Age: 64
End: 2019-01-07
Payer: MEDICARE

## 2019-01-07 VITALS
DIASTOLIC BLOOD PRESSURE: 80 MMHG | WEIGHT: 201 LBS | BODY MASS INDEX: 31.55 KG/M2 | HEIGHT: 67 IN | SYSTOLIC BLOOD PRESSURE: 130 MMHG

## 2019-01-07 DIAGNOSIS — E55.9 VITAMIN D DEFICIENCY: ICD-10-CM

## 2019-01-07 DIAGNOSIS — Z12.39 BREAST CANCER SCREENING: ICD-10-CM

## 2019-01-07 DIAGNOSIS — Z78.0 POSTMENOPAUSAL: ICD-10-CM

## 2019-01-07 DIAGNOSIS — I48.91 ATRIAL FIBRILLATION, UNSPECIFIED TYPE (HCC): ICD-10-CM

## 2019-01-07 DIAGNOSIS — I48.0 PAROXYSMAL ATRIAL FIBRILLATION (HCC): ICD-10-CM

## 2019-01-07 DIAGNOSIS — K21.9 GERD WITHOUT ESOPHAGITIS: ICD-10-CM

## 2019-01-07 DIAGNOSIS — E03.9 ACQUIRED HYPOTHYROIDISM: ICD-10-CM

## 2019-01-07 DIAGNOSIS — I10 ESSENTIAL HYPERTENSION: Primary | ICD-10-CM

## 2019-01-07 PROCEDURE — G8427 DOCREV CUR MEDS BY ELIG CLIN: HCPCS | Performed by: INTERNAL MEDICINE

## 2019-01-07 PROCEDURE — 1036F TOBACCO NON-USER: CPT | Performed by: INTERNAL MEDICINE

## 2019-01-07 PROCEDURE — 99214 OFFICE O/P EST MOD 30 MIN: CPT | Performed by: INTERNAL MEDICINE

## 2019-01-07 PROCEDURE — 4040F PNEUMOC VAC/ADMIN/RCVD: CPT | Performed by: INTERNAL MEDICINE

## 2019-01-07 PROCEDURE — G8417 CALC BMI ABV UP PARAM F/U: HCPCS | Performed by: INTERNAL MEDICINE

## 2019-01-07 PROCEDURE — 1090F PRES/ABSN URINE INCON ASSESS: CPT | Performed by: INTERNAL MEDICINE

## 2019-01-07 PROCEDURE — 3017F COLORECTAL CA SCREEN DOC REV: CPT | Performed by: INTERNAL MEDICINE

## 2019-01-07 PROCEDURE — 1123F ACP DISCUSS/DSCN MKR DOCD: CPT | Performed by: INTERNAL MEDICINE

## 2019-01-07 PROCEDURE — G8400 PT W/DXA NO RESULTS DOC: HCPCS | Performed by: INTERNAL MEDICINE

## 2019-01-07 PROCEDURE — G8482 FLU IMMUNIZE ORDER/ADMIN: HCPCS | Performed by: INTERNAL MEDICINE

## 2019-01-07 PROCEDURE — 1101F PT FALLS ASSESS-DOCD LE1/YR: CPT | Performed by: INTERNAL MEDICINE

## 2019-01-07 RX ORDER — OMEPRAZOLE 20 MG/1
20 CAPSULE, DELAYED RELEASE ORAL DAILY
Qty: 90 CAPSULE | Refills: 1 | Status: SHIPPED | OUTPATIENT
Start: 2019-01-07 | End: 2019-07-08 | Stop reason: SDUPTHER

## 2019-01-07 RX ORDER — LEVOTHYROXINE SODIUM 0.05 MG/1
50 TABLET ORAL DAILY
Qty: 90 TABLET | Refills: 1 | Status: SHIPPED | OUTPATIENT
Start: 2019-01-07 | End: 2019-07-08 | Stop reason: SDUPTHER

## 2019-01-07 ASSESSMENT — ENCOUNTER SYMPTOMS
ABDOMINAL PAIN: 0
BLURRED VISION: 0
WHEEZING: 0
COUGH: 0
SORE THROAT: 0
SHORTNESS OF BREATH: 0
NAUSEA: 0

## 2019-01-07 ASSESSMENT — PATIENT HEALTH QUESTIONNAIRE - PHQ9
2. FEELING DOWN, DEPRESSED OR HOPELESS: 0
1. LITTLE INTEREST OR PLEASURE IN DOING THINGS: 0
SUM OF ALL RESPONSES TO PHQ QUESTIONS 1-9: 0
SUM OF ALL RESPONSES TO PHQ9 QUESTIONS 1 & 2: 0
SUM OF ALL RESPONSES TO PHQ QUESTIONS 1-9: 0

## 2019-02-01 ENCOUNTER — HOSPITAL ENCOUNTER (OUTPATIENT)
Dept: BONE DENSITY | Age: 64
Discharge: HOME | End: 2019-02-03
Payer: MEDICARE

## 2019-02-01 ENCOUNTER — HOSPITAL ENCOUNTER (OUTPATIENT)
Dept: MAMMOGRAPHY | Age: 64
Discharge: HOME | End: 2019-02-03
Payer: MEDICARE

## 2019-02-01 DIAGNOSIS — Z12.39 BREAST CANCER SCREENING: ICD-10-CM

## 2019-02-01 DIAGNOSIS — Z78.0 POSTMENOPAUSAL: ICD-10-CM

## 2019-02-01 PROCEDURE — 77080 DXA BONE DENSITY AXIAL: CPT

## 2019-02-01 PROCEDURE — 77067 SCR MAMMO BI INCL CAD: CPT

## 2019-02-05 ENCOUNTER — OFFICE VISIT (OUTPATIENT)
Dept: OBGYN CLINIC | Age: 64
End: 2019-02-05
Payer: MEDICARE

## 2019-02-05 ENCOUNTER — HOSPITAL ENCOUNTER (OUTPATIENT)
Age: 64
Setting detail: SPECIMEN
Discharge: HOME | End: 2019-02-05
Payer: MEDICARE

## 2019-02-05 VITALS
BODY MASS INDEX: 31.71 KG/M2 | SYSTOLIC BLOOD PRESSURE: 138 MMHG | RESPIRATION RATE: 16 BRPM | DIASTOLIC BLOOD PRESSURE: 86 MMHG | HEART RATE: 90 BPM | WEIGHT: 202 LBS | HEIGHT: 67 IN

## 2019-02-05 DIAGNOSIS — Z12.4 ENCOUNTER FOR SCREENING FOR CERVICAL CANCER: Primary | ICD-10-CM

## 2019-02-05 DIAGNOSIS — N90.4 VULVAR DYSTROPHY: ICD-10-CM

## 2019-02-05 DIAGNOSIS — Z12.4 ENCOUNTER FOR SCREENING FOR CERVICAL CANCER: ICD-10-CM

## 2019-02-05 PROCEDURE — G8482 FLU IMMUNIZE ORDER/ADMIN: HCPCS | Performed by: OBSTETRICS & GYNECOLOGY

## 2019-02-05 PROCEDURE — G0101 CA SCREEN;PELVIC/BREAST EXAM: HCPCS | Performed by: OBSTETRICS & GYNECOLOGY

## 2019-02-05 PROCEDURE — G0145 SCR C/V CYTO,THINLAYER,RESCR: HCPCS

## 2019-02-05 RX ORDER — TRIAMCINOLONE ACETONIDE 1 MG/G
CREAM TOPICAL
Qty: 80 G | Refills: 2 | Status: SHIPPED | OUTPATIENT
Start: 2019-02-05 | End: 2019-12-10 | Stop reason: SDUPTHER

## 2019-02-06 LAB — COMMENT: NORMAL

## 2019-02-11 ENCOUNTER — OFFICE VISIT (OUTPATIENT)
Dept: SURGERY | Age: 64
End: 2019-02-11
Payer: MEDICARE

## 2019-02-11 VITALS
RESPIRATION RATE: 18 BRPM | SYSTOLIC BLOOD PRESSURE: 124 MMHG | HEART RATE: 85 BPM | WEIGHT: 202 LBS | BODY MASS INDEX: 31.71 KG/M2 | DIASTOLIC BLOOD PRESSURE: 71 MMHG | HEIGHT: 67 IN

## 2019-02-11 DIAGNOSIS — K82.8 BILIARY DYSKINESIA: Primary | ICD-10-CM

## 2019-02-11 DIAGNOSIS — Z87.19 HISTORY OF GASTRITIS: ICD-10-CM

## 2019-02-11 DIAGNOSIS — R10.9 CHRONIC ABDOMINAL PAIN: ICD-10-CM

## 2019-02-11 DIAGNOSIS — Z86.19 HISTORY OF HELICOBACTER PYLORI INFECTION: ICD-10-CM

## 2019-02-11 DIAGNOSIS — G89.29 CHRONIC ABDOMINAL PAIN: ICD-10-CM

## 2019-02-11 PROCEDURE — 99211 OFF/OP EST MAY X REQ PHY/QHP: CPT | Performed by: SURGERY

## 2019-02-11 PROCEDURE — 3017F COLORECTAL CA SCREEN DOC REV: CPT | Performed by: SURGERY

## 2019-02-11 PROCEDURE — G8427 DOCREV CUR MEDS BY ELIG CLIN: HCPCS | Performed by: SURGERY

## 2019-02-11 PROCEDURE — G8417 CALC BMI ABV UP PARAM F/U: HCPCS | Performed by: SURGERY

## 2019-02-20 LAB — CYTOLOGY REPORT: NORMAL

## 2019-02-25 ENCOUNTER — TELEPHONE (OUTPATIENT)
Dept: CARDIOLOGY CLINIC | Age: 64
End: 2019-02-25

## 2019-03-04 ENCOUNTER — ANESTHESIA (OUTPATIENT)
Dept: OPERATING ROOM | Age: 64
End: 2019-03-04
Payer: MEDICARE

## 2019-03-04 ENCOUNTER — HOSPITAL ENCOUNTER (OUTPATIENT)
Age: 64
Setting detail: OUTPATIENT SURGERY
Discharge: HOME | End: 2019-03-04
Attending: SURGERY | Admitting: SURGERY
Payer: MEDICARE

## 2019-03-04 ENCOUNTER — ANESTHESIA EVENT (OUTPATIENT)
Dept: OPERATING ROOM | Age: 64
End: 2019-03-04
Payer: MEDICARE

## 2019-03-04 VITALS
TEMPERATURE: 97 F | WEIGHT: 198 LBS | BODY MASS INDEX: 33.8 KG/M2 | SYSTOLIC BLOOD PRESSURE: 118 MMHG | RESPIRATION RATE: 16 BRPM | HEART RATE: 54 BPM | OXYGEN SATURATION: 96 % | HEIGHT: 64 IN | DIASTOLIC BLOOD PRESSURE: 58 MMHG

## 2019-03-04 VITALS
TEMPERATURE: 98.6 F | RESPIRATION RATE: 10 BRPM | OXYGEN SATURATION: 99 % | SYSTOLIC BLOOD PRESSURE: 100 MMHG | DIASTOLIC BLOOD PRESSURE: 53 MMHG

## 2019-03-04 DIAGNOSIS — K80.20 SYMPTOMATIC CHOLELITHIASIS: Primary | ICD-10-CM

## 2019-03-04 PROCEDURE — 88304 TISSUE EXAM BY PATHOLOGIST: CPT

## 2019-03-04 PROCEDURE — 3700000000 HC ANESTHESIA ATTENDED CARE: Performed by: SURGERY

## 2019-03-04 PROCEDURE — 2580000003 HC RX 258: Performed by: SURGERY

## 2019-03-04 PROCEDURE — 2500000003 HC RX 250 WO HCPCS: Performed by: NURSE ANESTHETIST, CERTIFIED REGISTERED

## 2019-03-04 PROCEDURE — 3600000013 HC SURGERY LEVEL 3 ADDTL 15MIN: Performed by: SURGERY

## 2019-03-04 PROCEDURE — 7100000010 HC PHASE II RECOVERY - FIRST 15 MIN: Performed by: SURGERY

## 2019-03-04 PROCEDURE — 6360000002 HC RX W HCPCS: Performed by: NURSE ANESTHETIST, CERTIFIED REGISTERED

## 2019-03-04 PROCEDURE — 7100000000 HC PACU RECOVERY - FIRST 15 MIN: Performed by: SURGERY

## 2019-03-04 PROCEDURE — 3700000001 HC ADD 15 MINUTES (ANESTHESIA): Performed by: SURGERY

## 2019-03-04 PROCEDURE — 6360000002 HC RX W HCPCS: Performed by: SURGERY

## 2019-03-04 PROCEDURE — 3600000003 HC SURGERY LEVEL 3 BASE: Performed by: SURGERY

## 2019-03-04 PROCEDURE — 6370000000 HC RX 637 (ALT 250 FOR IP): Performed by: SURGERY

## 2019-03-04 PROCEDURE — 2780000010 HC IMPLANT OTHER: Performed by: SURGERY

## 2019-03-04 PROCEDURE — 64488 TAP BLOCK BI INJECTION: CPT | Performed by: NURSE ANESTHETIST, CERTIFIED REGISTERED

## 2019-03-04 PROCEDURE — 2500000003 HC RX 250 WO HCPCS: Performed by: SURGERY

## 2019-03-04 PROCEDURE — 7100000011 HC PHASE II RECOVERY - ADDTL 15 MIN: Performed by: SURGERY

## 2019-03-04 PROCEDURE — 2709999900 HC NON-CHARGEABLE SUPPLY: Performed by: SURGERY

## 2019-03-04 PROCEDURE — 7100000001 HC PACU RECOVERY - ADDTL 15 MIN: Performed by: SURGERY

## 2019-03-04 RX ORDER — SODIUM CHLORIDE, SODIUM LACTATE, POTASSIUM CHLORIDE, CALCIUM CHLORIDE 600; 310; 30; 20 MG/100ML; MG/100ML; MG/100ML; MG/100ML
INJECTION, SOLUTION INTRAVENOUS CONTINUOUS
Status: DISCONTINUED | OUTPATIENT
Start: 2019-03-04 | End: 2019-03-04 | Stop reason: HOSPADM

## 2019-03-04 RX ORDER — MIDAZOLAM HYDROCHLORIDE 1 MG/ML
INJECTION INTRAMUSCULAR; INTRAVENOUS PRN
Status: DISCONTINUED | OUTPATIENT
Start: 2019-03-04 | End: 2019-03-04 | Stop reason: SDUPTHER

## 2019-03-04 RX ORDER — ONDANSETRON 2 MG/ML
4 INJECTION INTRAMUSCULAR; INTRAVENOUS EVERY 6 HOURS PRN
Status: DISCONTINUED | OUTPATIENT
Start: 2019-03-04 | End: 2019-03-04 | Stop reason: HOSPADM

## 2019-03-04 RX ORDER — ACETAMINOPHEN 10 MG/ML
INJECTION, SOLUTION INTRAVENOUS PRN
Status: DISCONTINUED | OUTPATIENT
Start: 2019-03-04 | End: 2019-03-04 | Stop reason: SDUPTHER

## 2019-03-04 RX ORDER — DEXAMETHASONE SODIUM PHOSPHATE 4 MG/ML
INJECTION, SOLUTION INTRA-ARTICULAR; INTRALESIONAL; INTRAMUSCULAR; INTRAVENOUS; SOFT TISSUE PRN
Status: DISCONTINUED | OUTPATIENT
Start: 2019-03-04 | End: 2019-03-04 | Stop reason: SDUPTHER

## 2019-03-04 RX ORDER — MORPHINE SULFATE 2 MG/ML
1 INJECTION, SOLUTION INTRAMUSCULAR; INTRAVENOUS
Status: DISCONTINUED | OUTPATIENT
Start: 2019-03-04 | End: 2019-03-04 | Stop reason: HOSPADM

## 2019-03-04 RX ORDER — GINSENG 100 MG
CAPSULE ORAL PRN
Status: DISCONTINUED | OUTPATIENT
Start: 2019-03-04 | End: 2019-03-04 | Stop reason: ALTCHOICE

## 2019-03-04 RX ORDER — PROMETHAZINE HYDROCHLORIDE 25 MG/ML
INJECTION, SOLUTION INTRAMUSCULAR; INTRAVENOUS PRN
Status: DISCONTINUED | OUTPATIENT
Start: 2019-03-04 | End: 2019-03-04 | Stop reason: SDUPTHER

## 2019-03-04 RX ORDER — LIDOCAINE HYDROCHLORIDE 10 MG/ML
INJECTION, SOLUTION EPIDURAL; INFILTRATION; INTRACAUDAL; PERINEURAL PRN
Status: DISCONTINUED | OUTPATIENT
Start: 2019-03-04 | End: 2019-03-04 | Stop reason: ALTCHOICE

## 2019-03-04 RX ORDER — SODIUM CHLORIDE 0.9 % (FLUSH) 0.9 %
10 SYRINGE (ML) INJECTION EVERY 12 HOURS SCHEDULED
Status: DISCONTINUED | OUTPATIENT
Start: 2019-03-04 | End: 2019-03-04 | Stop reason: HOSPADM

## 2019-03-04 RX ORDER — DEXAMETHASONE SODIUM PHOSPHATE 10 MG/ML
INJECTION INTRAMUSCULAR; INTRAVENOUS PRN
Status: DISCONTINUED | OUTPATIENT
Start: 2019-03-04 | End: 2019-03-04 | Stop reason: SDUPTHER

## 2019-03-04 RX ORDER — SUCCINYLCHOLINE CHLORIDE 20 MG/ML
INJECTION INTRAMUSCULAR; INTRAVENOUS PRN
Status: DISCONTINUED | OUTPATIENT
Start: 2019-03-04 | End: 2019-03-04 | Stop reason: SDUPTHER

## 2019-03-04 RX ORDER — SODIUM CHLORIDE 0.9 % (FLUSH) 0.9 %
10 SYRINGE (ML) INJECTION PRN
Status: DISCONTINUED | OUTPATIENT
Start: 2019-03-04 | End: 2019-03-04 | Stop reason: HOSPADM

## 2019-03-04 RX ORDER — LIDOCAINE HYDROCHLORIDE 10 MG/ML
INJECTION, SOLUTION EPIDURAL; INFILTRATION; INTRACAUDAL; PERINEURAL PRN
Status: DISCONTINUED | OUTPATIENT
Start: 2019-03-04 | End: 2019-03-04 | Stop reason: SDUPTHER

## 2019-03-04 RX ORDER — KETOROLAC TROMETHAMINE 30 MG/ML
INJECTION, SOLUTION INTRAMUSCULAR; INTRAVENOUS PRN
Status: DISCONTINUED | OUTPATIENT
Start: 2019-03-04 | End: 2019-03-04 | Stop reason: SDUPTHER

## 2019-03-04 RX ORDER — ROCURONIUM BROMIDE 10 MG/ML
INJECTION, SOLUTION INTRAVENOUS PRN
Status: DISCONTINUED | OUTPATIENT
Start: 2019-03-04 | End: 2019-03-04 | Stop reason: SDUPTHER

## 2019-03-04 RX ORDER — CEFAZOLIN SODIUM 2 G/50ML
2 SOLUTION INTRAVENOUS
Status: COMPLETED | OUTPATIENT
Start: 2019-03-04 | End: 2019-03-04

## 2019-03-04 RX ORDER — ONDANSETRON 2 MG/ML
INJECTION INTRAMUSCULAR; INTRAVENOUS PRN
Status: DISCONTINUED | OUTPATIENT
Start: 2019-03-04 | End: 2019-03-04 | Stop reason: SDUPTHER

## 2019-03-04 RX ORDER — ROPIVACAINE HYDROCHLORIDE 5 MG/ML
INJECTION, SOLUTION EPIDURAL; INFILTRATION; PERINEURAL PRN
Status: DISCONTINUED | OUTPATIENT
Start: 2019-03-04 | End: 2019-03-04 | Stop reason: SDUPTHER

## 2019-03-04 RX ORDER — HYDROCODONE BITARTRATE AND ACETAMINOPHEN 5; 325 MG/1; MG/1
1 TABLET ORAL EVERY 6 HOURS PRN
Qty: 20 TABLET | Refills: 0 | Status: SHIPPED | OUTPATIENT
Start: 2019-03-04 | End: 2019-03-09

## 2019-03-04 RX ORDER — PROPOFOL 10 MG/ML
INJECTION, EMULSION INTRAVENOUS PRN
Status: DISCONTINUED | OUTPATIENT
Start: 2019-03-04 | End: 2019-03-04 | Stop reason: SDUPTHER

## 2019-03-04 RX ORDER — BUPIVACAINE HYDROCHLORIDE AND EPINEPHRINE 2.5; 5 MG/ML; UG/ML
INJECTION, SOLUTION EPIDURAL; INFILTRATION; INTRACAUDAL; PERINEURAL PRN
Status: DISCONTINUED | OUTPATIENT
Start: 2019-03-04 | End: 2019-03-04 | Stop reason: ALTCHOICE

## 2019-03-04 RX ORDER — FENTANYL CITRATE 50 UG/ML
INJECTION, SOLUTION INTRAMUSCULAR; INTRAVENOUS PRN
Status: DISCONTINUED | OUTPATIENT
Start: 2019-03-04 | End: 2019-03-04 | Stop reason: SDUPTHER

## 2019-03-04 RX ADMIN — DEXAMETHASONE SODIUM PHOSPHATE 4 MG: 4 INJECTION, SOLUTION INTRAMUSCULAR; INTRAVENOUS at 07:20

## 2019-03-04 RX ADMIN — DEXAMETHASONE SODIUM PHOSPHATE 10 MG: 10 INJECTION INTRAMUSCULAR; INTRAVENOUS at 08:22

## 2019-03-04 RX ADMIN — PROPOFOL 150 MG: 10 INJECTION, EMULSION INTRAVENOUS at 08:22

## 2019-03-04 RX ADMIN — CEFAZOLIN SODIUM 2 G: 2 SOLUTION INTRAVENOUS at 08:11

## 2019-03-04 RX ADMIN — SODIUM CHLORIDE, POTASSIUM CHLORIDE, SODIUM LACTATE AND CALCIUM CHLORIDE: 600; 310; 30; 20 INJECTION, SOLUTION INTRAVENOUS at 06:55

## 2019-03-04 RX ADMIN — FENTANYL CITRATE 25 MCG: 50 INJECTION INTRAMUSCULAR; INTRAVENOUS at 10:29

## 2019-03-04 RX ADMIN — FENTANYL CITRATE 50 MCG: 50 INJECTION INTRAMUSCULAR; INTRAVENOUS at 08:22

## 2019-03-04 RX ADMIN — ACETAMINOPHEN 1000 MG: 10 INJECTION, SOLUTION INTRAVENOUS at 07:30

## 2019-03-04 RX ADMIN — ROCURONIUM BROMIDE 20 MG: 10 INJECTION, SOLUTION INTRAVENOUS at 08:30

## 2019-03-04 RX ADMIN — SUCCINYLCHOLINE CHLORIDE 100 MG: 20 INJECTION INTRAMUSCULAR; INTRAVENOUS at 08:22

## 2019-03-04 RX ADMIN — MIDAZOLAM HYDROCHLORIDE 2 MG: 2 INJECTION, SOLUTION INTRAMUSCULAR; INTRAVENOUS at 07:15

## 2019-03-04 RX ADMIN — FENTANYL CITRATE 50 MCG: 50 INJECTION INTRAMUSCULAR; INTRAVENOUS at 07:16

## 2019-03-04 RX ADMIN — KETOROLAC TROMETHAMINE 30 MG: 30 INJECTION, SOLUTION INTRAMUSCULAR at 09:35

## 2019-03-04 RX ADMIN — LIDOCAINE HYDROCHLORIDE 5 ML: 10 INJECTION, SOLUTION EPIDURAL; INFILTRATION; INTRACAUDAL; PERINEURAL at 08:22

## 2019-03-04 RX ADMIN — ONDANSETRON 4 MG: 2 INJECTION, SOLUTION INTRAMUSCULAR; INTRAVENOUS at 08:22

## 2019-03-04 RX ADMIN — FENTANYL CITRATE 50 MCG: 50 INJECTION INTRAMUSCULAR; INTRAVENOUS at 10:15

## 2019-03-04 RX ADMIN — SUGAMMADEX 200 MG: 100 INJECTION, SOLUTION INTRAVENOUS at 09:40

## 2019-03-04 RX ADMIN — ROPIVACAINE HYDROCHLORIDE 40 ML: 5 INJECTION, SOLUTION EPIDURAL; INFILTRATION; PERINEURAL at 07:20

## 2019-03-04 RX ADMIN — PROMETHAZINE HYDROCHLORIDE 12.5 MG: 25 INJECTION INTRAMUSCULAR; INTRAVENOUS at 11:00

## 2019-03-04 RX ADMIN — SODIUM CHLORIDE, POTASSIUM CHLORIDE, SODIUM LACTATE AND CALCIUM CHLORIDE: 600; 310; 30; 20 INJECTION, SOLUTION INTRAVENOUS at 09:52

## 2019-03-04 ASSESSMENT — PAIN SCALES - GENERAL
PAINLEVEL_OUTOF10: 5
PAINLEVEL_OUTOF10: 7
PAINLEVEL_OUTOF10: 8
PAINLEVEL_OUTOF10: 9
PAINLEVEL_OUTOF10: 2
PAINLEVEL_OUTOF10: 4
PAINLEVEL_OUTOF10: 2

## 2019-03-04 ASSESSMENT — PAIN DESCRIPTION - LOCATION: LOCATION: ABDOMEN

## 2019-03-05 LAB — SURGICAL PATHOLOGY REPORT: NORMAL

## 2019-03-11 ENCOUNTER — OFFICE VISIT (OUTPATIENT)
Dept: SURGERY | Age: 64
End: 2019-03-11

## 2019-03-11 VITALS
WEIGHT: 199 LBS | BODY MASS INDEX: 33.97 KG/M2 | RESPIRATION RATE: 16 BRPM | DIASTOLIC BLOOD PRESSURE: 76 MMHG | HEART RATE: 72 BPM | SYSTOLIC BLOOD PRESSURE: 128 MMHG | TEMPERATURE: 97 F | HEIGHT: 64 IN

## 2019-03-11 DIAGNOSIS — Z90.49 S/P LAPAROSCOPIC CHOLECYSTECTOMY: Primary | ICD-10-CM

## 2019-03-11 PROCEDURE — 99024 POSTOP FOLLOW-UP VISIT: CPT | Performed by: SURGERY

## 2019-03-18 ENCOUNTER — OFFICE VISIT (OUTPATIENT)
Dept: SURGERY | Age: 64
End: 2019-03-18

## 2019-03-18 VITALS — TEMPERATURE: 97.7 F | WEIGHT: 199 LBS | BODY MASS INDEX: 33.97 KG/M2 | HEIGHT: 64 IN

## 2019-03-18 DIAGNOSIS — Z90.49 HISTORY OF LAPAROSCOPIC CHOLECYSTECTOMY: Primary | ICD-10-CM

## 2019-03-18 PROCEDURE — 99024 POSTOP FOLLOW-UP VISIT: CPT | Performed by: SURGERY

## 2019-04-08 NOTE — PROGRESS NOTES
Subjective:      Patient ID: Eren Israel is a 70 y.o. female. HPI     Ms Aries Artist returns for follow up after lap giacomo March 4, 2019. She is doing well. No complaints. Original pain resolved. Tolerating diet. Review of Systems     Past Medical History:   Diagnosis Date    Arthritis     Asthma     Chickenpox     Hypertension     Hypothyroidism     Measles     Mumps     Osteoarthritis     Smallpox     Ulcer     Ulcer     Whooping cough        Past Surgical History:   Procedure Laterality Date    CHOLECYSTECTOMY, LAPAROSCOPIC N/A 3/4/2019    CHOLECYSTECTOMY LAPAROSCOPIC performed by Netta Guido MD at [address] [place] [address],[address]  07/20/2015    [address] MD    HYSTERECTOMY  2006    Vaginal hysterectomy dr Leonardo Gosselin  07/20/2015    Vinita Guevara MD       Family History   Problem Relation Age of Onset    Diabetes Father        Allergies:  See list    Current Outpatient Medications   Medication Sig Dispense Refill    triamcinolone (KENALOG) 0.1 % cream Apply to the affected area 2 times a day as needed for vulvar itching 80 g 2    levothyroxine (SYNTHROID) 50 MCG tablet Take 1 tablet by mouth Daily 90 tablet 1    rivaroxaban (XARELTO) 20 MG TABS tablet Take 1 tablet by mouth daily (with breakfast) 90 tablet 1    omeprazole (PRILOSEC) 20 MG delayed release capsule Take 1 capsule by mouth daily 90 capsule 1    Cholecalciferol (VITAMIN D) 2000 units CAPS capsule Take 4,000 Units by mouth daily      diltiazem (CARDIZEM CD) 120 MG extended release capsule Take 1 capsule by mouth daily 30 capsule 5    docusate sodium (COLACE) 100 MG capsule Take 1 capsule by mouth 2 times daily 60 capsule 1    ciclopirox (PENLAC) 8 % solution Apply topically nightly.  6.6 mL 2    hydrocortisone (ANUSOL-HC) 25 MG suppository Place 1 suppository rectally 2 times daily 30 suppository 1    albuterol (PROVENTIL) (2.5 MG/3ML) 0.083% nebulizer solution Take 3 mLs by nebulization every 4 hours as needed for Wheezing 120 each 2     No current facility-administered medications for this visit. Social History     Socioeconomic History    Marital status:      Spouse name: None    Number of children: None    Years of education: None    Highest education level: None   Occupational History    None   Social Needs    Financial resource strain: None    Food insecurity:     Worry: None     Inability: None    Transportation needs:     Medical: None     Non-medical: None   Tobacco Use    Smoking status: Never Smoker    Smokeless tobacco: Never Used   Substance and Sexual Activity    Alcohol use: No    Drug use: No    Sexual activity: Yes     Partners: Male   Lifestyle    Physical activity:     Days per week: None     Minutes per session: None    Stress: None   Relationships    Social connections:     Talks on phone: None     Gets together: None     Attends Voodoo service: None     Active member of club or organization: None     Attends meetings of clubs or organizations: None     Relationship status: None    Intimate partner violence:     Fear of current or ex partner: None     Emotionally abused: None     Physically abused: None     Forced sexual activity: None   Other Topics Concern    None   Social History Narrative    None       Objective:   Physical Exam   Constitutional: She is oriented to person, place, and time. She appears well-developed and well-nourished. No distress. HENT:   Head: Normocephalic and atraumatic. Eyes: Pupils are equal, round, and reactive to light. Conjunctivae are normal. No scleral icterus. Neck: No tracheal deviation present. Cardiovascular: Normal rate. Pulmonary/Chest: Effort normal. No respiratory distress. Abdominal:   Wounds C/D/I. Musculoskeletal: She exhibits no edema. Neurological: She is alert and oriented to person, place, and time. Skin: Skin is warm and dry. Psychiatric: She has a normal mood and affect.  Her behavior is normal. Judgment and thought content normal.   Nursing note and vitals reviewed. 3/5/2019 12:11 PM - Suman, Nell Incoming Lab Results From [facility]     Component Collected Lab   Surgical Pathology Report 03/04/2019  2:[address]   [address] [facility]   ANATOMIC PATHOLOGY   [address] [place] [address], [address]. [place], [address]   [phone]   Fax: [phone]     [facility]       Patient Name: Christiano Christian [facility] Rec: [id number]   Path Number: [id number]   Collected: 3/4/2019   Received: 3/4/2019   Reported: 3/5/2019 12:11     -- Diagnosis --   GALLBLADDER, CHOLECYSTECTOMY:        -CHOLESTEROLOSIS.      -NEGATIVE FOR SIGNIFICANT INFLAMMATION, CALCULI OR NEOPLASM. Sy Rhodes M.D.   **Electronically Signed Out**         jet/3/5/2019         Clinical Information   Pre-op Diagnosis:  CHOLELITHIASIS   Operative Findings:  [id number]   Operation Performed:  LAPAROSCOPIC CHOLECYSTECTOMY     Source of Specimen   1: GALLBLADDER     Gross Description   \"ALFRED Pepe" 8.3 x 4.2 x 3.0 cm intact gallbladder with   a 1.0 cm long x 0.5 cm in diameter cystic duct.  The serosa is   pink-tan and fatty, and the liver bed is coarse brown-green.  The wall   is 0.1 cm in thickness, and the lumen contains approximately 20 cc of   tenacious yellow-green bile.  No calculi are identified.  The mucosa   is brown, velvety with cholesterolosis.  Cystic duct margin and   sections of gallbladder mucosa 1cs.  tm       Microscopic Description   Microscopic examination performed. Assessment:       Diagnosis Orders   1. S/P laparoscopic cholecystectomy           Plan:      Doing well. Continue gradual advancement of diet and activity as tolerated, with no heavy lifting nor abdominal straining for the next few weeks. Follow up with me prn.         Elmer Mcclain MD

## 2019-04-21 DIAGNOSIS — I48.0 PAROXYSMAL ATRIAL FIBRILLATION (HCC): ICD-10-CM

## 2019-04-22 RX ORDER — DILTIAZEM HYDROCHLORIDE 120 MG/1
CAPSULE, EXTENDED RELEASE ORAL
Qty: 30 CAPSULE | Refills: 5 | Status: SHIPPED | OUTPATIENT
Start: 2019-04-22 | End: 2019-10-12 | Stop reason: SDUPTHER

## 2019-04-22 NOTE — TELEPHONE ENCOUNTER
Last OV: 1/7/2019  Last RX: 09/27/18   Next scheduled apt: 7/8/2019      Medication pending      Health Maintenance   Topic Date Due    DTaP/Tdap/Td vaccine (1 - Tdap) 05/27/1972    Shingles Vaccine (1 of 2) 05/27/2003    A1C test (Diabetic or Prediabetic)  03/26/2019    TSH testing  09/28/2019    Pneumococcal 65+ years Vaccine (2 of 2 - PPSV23) 11/20/2019    Breast cancer screen  02/01/2021    Cervical cancer screen  02/05/2022    Lipid screen  09/28/2023    Colon cancer screen colonoscopy  07/20/2025    DEXA (modify frequency per FRAX score)  Completed    Flu vaccine  Completed    Hepatitis C screen  Addressed    HIV screen  Addressed             (applicable per patient's age: Cancer Screenings, Depression Screening, Fall Risk Screening, Immunizations)    Hemoglobin A1C (%)   Date Value   03/26/2018 5.9   09/25/2017 6.0     LDL Cholesterol (mg/dL)   Date Value   09/28/2018 95     AST (U/L)   Date Value   09/28/2018 14     ALT (U/L)   Date Value   09/28/2018 30     BUN (mg/dL)   Date Value   11/27/2018 21      (goal A1C is < 7)   (goal LDL is <100) need 30-50% reduction from baseline     BP Readings from Last 3 Encounters:   03/11/19 128/76   03/04/19 (!) 118/58   03/04/19 (!) 100/53    (goal /80)      All Future Testing planned in [facility]:  Lab Frequency Next Occurrence   CBC Auto Differential Once 10/02/2019   Comprehensive Metabolic Panel Once 96/88/0153   Vitamin D 25 Hydroxy Once 10/02/2019   Lipid Panel Once 10/02/2019   TSH with Reflex Once 10/02/2019   Magnesium Once 10/02/2019   EKG 12 Lead Once 10/02/2019   XR CHEST STANDARD (2 VW) Once 10/02/2019       Next Visit Date:  Future Appointments   Date Time Provider Alexey Mosqueda   7/8/2019  3:00 PM Valerie Holding, MD [id number]   9/24/2019 10:30 AM MD [facility]            Patient Active Problem List:     Hypothyroidism     Hx of gastroesophageal reflux (GERD)     Pneumonia of right lower lobe due to infectious organism [facility])     Prediabetes     Paroxysmal atrial fibrillation (HCC)     Symptomatic cholelithiasis

## 2019-05-03 DIAGNOSIS — J45.41 MODERATE PERSISTENT ASTHMA WITH EXACERBATION: ICD-10-CM

## 2019-05-03 DIAGNOSIS — J21.9 ACUTE BRONCHIOLITIS DUE TO UNSPECIFIED ORGANISM: ICD-10-CM

## 2019-05-03 RX ORDER — ALBUTEROL SULFATE 2.5 MG/3ML
2.5 SOLUTION RESPIRATORY (INHALATION) EVERY 4 HOURS PRN
Qty: 120 EACH | Refills: 2 | Status: SHIPPED | OUTPATIENT
Start: 2019-05-03 | End: 2019-09-24 | Stop reason: ALTCHOICE

## 2019-05-03 NOTE — TELEPHONE ENCOUNTER
Rx refill request    [facility]    Albuterol Solution    Last seen 1/7/19    Health Maintenance   Topic Date Due    DTaP/Tdap/Td vaccine (1 - Tdap) 05/27/1972    Shingles Vaccine (1 of 2) 05/27/2003    A1C test (Diabetic or Prediabetic)  03/26/2019    TSH testing  09/28/2019    Pneumococcal 65+ years Vaccine (2 of 2 - PPSV23) 11/20/2019    Breast cancer screen  02/01/2021    Cervical cancer screen  02/05/2022    Lipid screen  09/28/2023    Colon cancer screen colonoscopy  07/20/2025    DEXA (modify frequency per FRAX score)  Completed    Flu vaccine  Completed    Hepatitis C screen  Addressed    HIV screen  Addressed             (applicable per patient's age: Cancer Screenings, Depression Screening, Fall Risk Screening, Immunizations)    Hemoglobin A1C (%)   Date Value   03/26/2018 5.9   09/25/2017 6.0     LDL Cholesterol (mg/dL)   Date Value   09/28/2018 95     AST (U/L)   Date Value   09/28/2018 14     ALT (U/L)   Date Value   09/28/2018 30     BUN (mg/dL)   Date Value   11/27/2018 21      (goal A1C is < 7)   (goal LDL is <100) need 30-50% reduction from baseline     BP Readings from Last 3 Encounters:   03/11/19 128/76   03/04/19 (!) 118/58   03/04/19 (!) 100/53    (goal /80)      All Future Testing planned in [facility]:  Lab Frequency Next Occurrence   CBC Auto Differential Once 10/02/2019   Comprehensive Metabolic Panel Once 51/07/7026   Vitamin D 25 Hydroxy Once 10/02/2019   Lipid Panel Once 10/02/2019   TSH with Reflex Once 10/02/2019   Magnesium Once 10/02/2019   EKG 12 Lead Once 10/02/2019   XR CHEST STANDARD (2 VW) Once 10/02/2019       Next Visit Date:  Future Appointments   Date Time Provider Alexey Mosqueda   7/8/2019  3:00 PM MD [facility]   9/24/2019 10:30 AM MD Pratik Carbajal [facility]            Patient Active Problem List:     Hypothyroidism     Hx of gastroesophageal reflux (GERD)     Pneumonia of right lower lobe due to infectious organism ([facility]     Prediabetes     Paroxysmal atrial fibrillation (HCC)     Symptomatic cholelithiasis

## 2019-07-08 ENCOUNTER — OFFICE VISIT (OUTPATIENT)
Dept: FAMILY MEDICINE CLINIC | Age: 64
End: 2019-07-08
Payer: MEDICARE

## 2019-07-08 VITALS
HEIGHT: 64 IN | BODY MASS INDEX: 33.63 KG/M2 | HEART RATE: 75 BPM | WEIGHT: 197 LBS | DIASTOLIC BLOOD PRESSURE: 70 MMHG | OXYGEN SATURATION: 97 % | SYSTOLIC BLOOD PRESSURE: 118 MMHG

## 2019-07-08 DIAGNOSIS — K21.9 GERD WITHOUT ESOPHAGITIS: ICD-10-CM

## 2019-07-08 DIAGNOSIS — R73.03 PREDIABETES: ICD-10-CM

## 2019-07-08 DIAGNOSIS — E03.9 ACQUIRED HYPOTHYROIDISM: ICD-10-CM

## 2019-07-08 DIAGNOSIS — I10 ESSENTIAL HYPERTENSION: Primary | ICD-10-CM

## 2019-07-08 DIAGNOSIS — M25.551 PAIN OF RIGHT HIP JOINT: ICD-10-CM

## 2019-07-08 LAB — HBA1C MFR BLD: 5.6 %

## 2019-07-08 PROCEDURE — G8399 PT W/DXA RESULTS DOCUMENT: HCPCS | Performed by: INTERNAL MEDICINE

## 2019-07-08 PROCEDURE — 83036 HEMOGLOBIN GLYCOSYLATED A1C: CPT | Performed by: INTERNAL MEDICINE

## 2019-07-08 PROCEDURE — 99214 OFFICE O/P EST MOD 30 MIN: CPT | Performed by: INTERNAL MEDICINE

## 2019-07-08 PROCEDURE — 4040F PNEUMOC VAC/ADMIN/RCVD: CPT | Performed by: INTERNAL MEDICINE

## 2019-07-08 PROCEDURE — 1090F PRES/ABSN URINE INCON ASSESS: CPT | Performed by: INTERNAL MEDICINE

## 2019-07-08 PROCEDURE — 3017F COLORECTAL CA SCREEN DOC REV: CPT | Performed by: INTERNAL MEDICINE

## 2019-07-08 PROCEDURE — G8417 CALC BMI ABV UP PARAM F/U: HCPCS | Performed by: INTERNAL MEDICINE

## 2019-07-08 PROCEDURE — 1036F TOBACCO NON-USER: CPT | Performed by: INTERNAL MEDICINE

## 2019-07-08 PROCEDURE — G8428 CUR MEDS NOT DOCUMENT: HCPCS | Performed by: INTERNAL MEDICINE

## 2019-07-08 PROCEDURE — 1123F ACP DISCUSS/DSCN MKR DOCD: CPT | Performed by: INTERNAL MEDICINE

## 2019-07-08 RX ORDER — LEVOTHYROXINE SODIUM 0.05 MG/1
50 TABLET ORAL DAILY
Qty: 90 TABLET | Refills: 1 | Status: SHIPPED | OUTPATIENT
Start: 2019-07-08 | End: 2020-01-10 | Stop reason: SDUPTHER

## 2019-07-08 RX ORDER — OMEPRAZOLE 20 MG/1
20 CAPSULE, DELAYED RELEASE ORAL DAILY
Qty: 90 CAPSULE | Refills: 1 | Status: SHIPPED | OUTPATIENT
Start: 2019-07-08 | End: 2020-01-10 | Stop reason: SDUPTHER

## 2019-07-08 ASSESSMENT — ENCOUNTER SYMPTOMS
SORE THROAT: 0
ABDOMINAL PAIN: 0
WHEEZING: 0
NAUSEA: 0
SHORTNESS OF BREATH: 0
COUGH: 0
BELCHING: 0
HEARTBURN: 0
BLURRED VISION: 0

## 2019-07-08 NOTE — PROGRESS NOTES
Completed Refills      Requested Prescriptions     Signed Prescriptions Disp Refills    omeprazole (PRILOSEC) 20 MG delayed release capsule 90 capsule 1     Sig: Take 1 capsule by mouth daily    levothyroxine (SYNTHROID) 50 MCG tablet 90 tablet 1     Sig: Take 1 tablet by mouth Daily

## 2019-07-13 ENCOUNTER — HOSPITAL ENCOUNTER (OUTPATIENT)
Dept: GENERAL RADIOLOGY | Age: 64
Discharge: HOME | End: 2019-07-15
Payer: MEDICARE

## 2019-07-13 ENCOUNTER — HOSPITAL ENCOUNTER (OUTPATIENT)
Age: 64
Discharge: HOME | End: 2019-07-13
Payer: MEDICARE

## 2019-07-13 ENCOUNTER — HOSPITAL ENCOUNTER (OUTPATIENT)
Age: 64
Discharge: HOME | End: 2019-07-15
Payer: MEDICARE

## 2019-07-13 DIAGNOSIS — E03.9 ACQUIRED HYPOTHYROIDISM: ICD-10-CM

## 2019-07-13 DIAGNOSIS — M25.551 PAIN OF RIGHT HIP JOINT: ICD-10-CM

## 2019-07-13 DIAGNOSIS — I10 ESSENTIAL HYPERTENSION: ICD-10-CM

## 2019-07-13 LAB
ANION GAP SERPL CALCULATED.3IONS-SCNC: 10 MMOL/L (ref 9–17)
BUN BLDV-MCNC: 14 MG/DL (ref 8–23)
BUN/CREAT BLD: 20 (ref 9–20)
CALCIUM SERPL-MCNC: 10.1 MG/DL (ref 8.6–10.4)
CHLORIDE BLD-SCNC: 102 MMOL/L (ref 98–107)
CO2: 30 MMOL/L (ref 20–31)
CREAT SERPL-MCNC: 0.71 MG/DL (ref 0.5–0.9)
GFR AFRICAN AMERICAN: >60 ML/MIN
GFR NON-AFRICAN AMERICAN: >60 ML/MIN
GFR SERPL CREATININE-BSD FRML MDRD: ABNORMAL ML/MIN/{1.73_M2}
GFR SERPL CREATININE-BSD FRML MDRD: ABNORMAL ML/MIN/{1.73_M2}
GLUCOSE BLD-MCNC: 108 MG/DL (ref 70–99)
POTASSIUM SERPL-SCNC: 4.8 MMOL/L (ref 3.7–5.3)
SODIUM BLD-SCNC: 142 MMOL/L (ref 135–144)
TSH SERPL DL<=0.05 MIU/L-ACNC: 5.12 MIU/L (ref 0.3–5)

## 2019-07-13 PROCEDURE — 36415 COLL VENOUS BLD VENIPUNCTURE: CPT

## 2019-07-13 PROCEDURE — 80048 BASIC METABOLIC PNL TOTAL CA: CPT

## 2019-07-13 PROCEDURE — 84443 ASSAY THYROID STIM HORMONE: CPT

## 2019-07-13 PROCEDURE — 73502 X-RAY EXAM HIP UNI 2-3 VIEWS: CPT

## 2019-07-19 ENCOUNTER — TELEPHONE (OUTPATIENT)
Dept: FAMILY MEDICINE CLINIC | Age: 64
End: 2019-07-19

## 2019-09-18 ENCOUNTER — HOSPITAL ENCOUNTER (OUTPATIENT)
Age: 64
Discharge: HOME | End: 2019-09-18
Payer: MEDICARE

## 2019-09-18 ENCOUNTER — HOSPITAL ENCOUNTER (OUTPATIENT)
Age: 64
Discharge: HOME | End: 2019-09-20
Payer: MEDICARE

## 2019-09-18 ENCOUNTER — HOSPITAL ENCOUNTER (OUTPATIENT)
Dept: GENERAL RADIOLOGY | Age: 64
Discharge: HOME | End: 2019-09-20
Payer: MEDICARE

## 2019-09-18 DIAGNOSIS — E55.9 VITAMIN D DEFICIENCY DISEASE: ICD-10-CM

## 2019-09-18 DIAGNOSIS — I48.0 PAROXYSMAL ATRIAL FIBRILLATION (HCC): ICD-10-CM

## 2019-09-18 DIAGNOSIS — E03.9 ACQUIRED HYPOTHYROIDISM: ICD-10-CM

## 2019-09-18 PROCEDURE — 93005 ELECTROCARDIOGRAM TRACING: CPT

## 2019-09-18 PROCEDURE — 71046 X-RAY EXAM CHEST 2 VIEWS: CPT

## 2019-09-19 ENCOUNTER — HOSPITAL ENCOUNTER (OUTPATIENT)
Age: 64
Discharge: HOME | End: 2019-09-19
Payer: MEDICARE

## 2019-09-19 DIAGNOSIS — I48.0 PAROXYSMAL ATRIAL FIBRILLATION (HCC): ICD-10-CM

## 2019-09-19 DIAGNOSIS — E03.9 ACQUIRED HYPOTHYROIDISM: ICD-10-CM

## 2019-09-19 DIAGNOSIS — E55.9 VITAMIN D DEFICIENCY DISEASE: ICD-10-CM

## 2019-09-19 LAB
ABSOLUTE EOS #: 0 K/UL (ref 0–0.4)
ABSOLUTE IMMATURE GRANULOCYTE: ABNORMAL K/UL (ref 0–0.3)
ABSOLUTE LYMPH #: 1.4 K/UL (ref 1–4.8)
ABSOLUTE MONO #: 0.6 K/UL (ref 0–1)
ALBUMIN SERPL-MCNC: 4.2 G/DL (ref 3.5–5.2)
ALBUMIN/GLOBULIN RATIO: ABNORMAL (ref 1–2.5)
ALP BLD-CCNC: 117 U/L (ref 35–104)
ALT SERPL-CCNC: 16 U/L (ref 5–33)
ANION GAP SERPL CALCULATED.3IONS-SCNC: 8 MMOL/L (ref 9–17)
AST SERPL-CCNC: 13 U/L
BASOPHILS # BLD: 0 % (ref 0–2)
BASOPHILS ABSOLUTE: 0 K/UL (ref 0–0.2)
BILIRUB SERPL-MCNC: 0.43 MG/DL (ref 0.3–1.2)
BUN BLDV-MCNC: 13 MG/DL (ref 8–23)
BUN/CREAT BLD: 18 (ref 9–20)
CALCIUM SERPL-MCNC: 9.9 MG/DL (ref 8.6–10.4)
CHLORIDE BLD-SCNC: 103 MMOL/L (ref 98–107)
CHOLESTEROL/HDL RATIO: 2.8
CHOLESTEROL: 167 MG/DL
CO2: 31 MMOL/L (ref 20–31)
CREAT SERPL-MCNC: 0.72 MG/DL (ref 0.5–0.9)
DIFFERENTIAL TYPE: YES
EKG ATRIAL RATE: 72 BPM
EKG P AXIS: 62 DEGREES
EKG P-R INTERVAL: 150 MS
EKG Q-T INTERVAL: 386 MS
EKG QRS DURATION: 110 MS
EKG QTC CALCULATION (BAZETT): 422 MS
EKG R AXIS: 88 DEGREES
EKG T AXIS: 78 DEGREES
EKG VENTRICULAR RATE: 72 BPM
EOSINOPHILS RELATIVE PERCENT: 0 % (ref 0–5)
GFR AFRICAN AMERICAN: >60 ML/MIN
GFR NON-AFRICAN AMERICAN: >60 ML/MIN
GFR SERPL CREATININE-BSD FRML MDRD: ABNORMAL ML/MIN/{1.73_M2}
GFR SERPL CREATININE-BSD FRML MDRD: ABNORMAL ML/MIN/{1.73_M2}
GLUCOSE BLD-MCNC: 106 MG/DL (ref 70–99)
HCT VFR BLD CALC: 42.6 % (ref 36–46)
HDLC SERPL-MCNC: 60 MG/DL
HEMOGLOBIN: 13.8 G/DL (ref 12–16)
IMMATURE GRANULOCYTES: ABNORMAL %
LDL CHOLESTEROL: 87 MG/DL (ref 0–130)
LYMPHOCYTES # BLD: 23 % (ref 15–40)
MAGNESIUM: 2.3 MG/DL (ref 1.6–2.6)
MCH RBC QN AUTO: 27.5 PG (ref 26–34)
MCHC RBC AUTO-ENTMCNC: 32.4 G/DL (ref 31–37)
MCV RBC AUTO: 85 FL (ref 80–100)
MONOCYTES # BLD: 10 % (ref 4–8)
NRBC AUTOMATED: ABNORMAL PER 100 WBC
PATIENT FASTING?: YES
PDW BLD-RTO: 15.1 % (ref 12.1–15.2)
PLATELET # BLD: 299 K/UL (ref 140–450)
PLATELET ESTIMATE: ABNORMAL
PMV BLD AUTO: ABNORMAL FL (ref 6–12)
POTASSIUM SERPL-SCNC: 4.7 MMOL/L (ref 3.7–5.3)
RBC # BLD: 5.01 M/UL (ref 4–5.2)
RBC # BLD: ABNORMAL 10*6/UL
SEG NEUTROPHILS: 67 % (ref 47–75)
SEGMENTED NEUTROPHILS ABSOLUTE COUNT: 4.2 K/UL (ref 2.5–7)
SODIUM BLD-SCNC: 142 MMOL/L (ref 135–144)
TOTAL PROTEIN: 8.9 G/DL (ref 6.4–8.3)
TRIGL SERPL-MCNC: 101 MG/DL
TSH SERPL DL<=0.05 MIU/L-ACNC: 3.11 MIU/L (ref 0.3–5)
VITAMIN D 25-HYDROXY: 27.9 NG/ML (ref 30–100)
VLDLC SERPL CALC-MCNC: NORMAL MG/DL (ref 1–30)
WBC # BLD: 6.3 K/UL (ref 3.5–11)
WBC # BLD: ABNORMAL 10*3/UL

## 2019-09-19 PROCEDURE — 83735 ASSAY OF MAGNESIUM: CPT

## 2019-09-19 PROCEDURE — 93010 ELECTROCARDIOGRAM REPORT: CPT | Performed by: INTERNAL MEDICINE

## 2019-09-19 PROCEDURE — 80061 LIPID PANEL: CPT

## 2019-09-19 PROCEDURE — 82306 VITAMIN D 25 HYDROXY: CPT

## 2019-09-19 PROCEDURE — 80053 COMPREHEN METABOLIC PANEL: CPT

## 2019-09-19 PROCEDURE — 85025 COMPLETE CBC W/AUTO DIFF WBC: CPT

## 2019-09-19 PROCEDURE — 36415 COLL VENOUS BLD VENIPUNCTURE: CPT

## 2019-09-19 PROCEDURE — 84443 ASSAY THYROID STIM HORMONE: CPT

## 2019-09-24 ENCOUNTER — OFFICE VISIT (OUTPATIENT)
Dept: CARDIOLOGY CLINIC | Age: 64
End: 2019-09-24
Payer: MEDICARE

## 2019-09-24 VITALS
WEIGHT: 195 LBS | DIASTOLIC BLOOD PRESSURE: 80 MMHG | OXYGEN SATURATION: 96 % | HEART RATE: 86 BPM | SYSTOLIC BLOOD PRESSURE: 120 MMHG | BODY MASS INDEX: 33.47 KG/M2

## 2019-09-24 DIAGNOSIS — I48.0 PAROXYSMAL ATRIAL FIBRILLATION (HCC): ICD-10-CM

## 2019-09-24 DIAGNOSIS — R10.84 GENERALIZED ABDOMINAL PAIN: Primary | ICD-10-CM

## 2019-09-24 DIAGNOSIS — E55.9 VITAMIN D DEFICIENCY DISEASE: ICD-10-CM

## 2019-09-24 DIAGNOSIS — E03.9 ACQUIRED HYPOTHYROIDISM: ICD-10-CM

## 2019-09-24 PROCEDURE — 3017F COLORECTAL CA SCREEN DOC REV: CPT | Performed by: INTERNAL MEDICINE

## 2019-09-24 PROCEDURE — 1123F ACP DISCUSS/DSCN MKR DOCD: CPT | Performed by: INTERNAL MEDICINE

## 2019-09-24 PROCEDURE — 4040F PNEUMOC VAC/ADMIN/RCVD: CPT | Performed by: INTERNAL MEDICINE

## 2019-09-24 PROCEDURE — 1036F TOBACCO NON-USER: CPT | Performed by: INTERNAL MEDICINE

## 2019-09-24 PROCEDURE — G8428 CUR MEDS NOT DOCUMENT: HCPCS | Performed by: INTERNAL MEDICINE

## 2019-09-24 PROCEDURE — 99214 OFFICE O/P EST MOD 30 MIN: CPT | Performed by: INTERNAL MEDICINE

## 2019-09-24 PROCEDURE — G8399 PT W/DXA RESULTS DOCUMENT: HCPCS | Performed by: INTERNAL MEDICINE

## 2019-09-24 PROCEDURE — 1090F PRES/ABSN URINE INCON ASSESS: CPT | Performed by: INTERNAL MEDICINE

## 2019-09-24 PROCEDURE — G8417 CALC BMI ABV UP PARAM F/U: HCPCS | Performed by: INTERNAL MEDICINE

## 2019-09-24 NOTE — LETTER
Dom Samaniego M.D. [address] [place] [address]  [phone]        2019        Doc MD Kory  [address] [facility], [address] [facility]    RE:   Allegra Jones   :  1955    Dear Dr. Ceci Lunsford:    CHIEF COMPLAINT:  1. Paroxysmal atrial fibrillation. 2.  On Xarelto for anticoagulation. 3.  Possible depression. HISTORY OF PRESENT ILLNESS:  I had the pleasure of seeing Mrs. Broderick Arrington in the office on 2019. She is a very pleasant 68-year-old [place] American female who is here with her son, Tico Hernandez, and her  who we also see as a patient. She is a 68-year-old and has a history of atrial fibrillation with RVR discovered on 09/15/2017 when she presented to the hospital with right lower lobe pneumonia. She converted back to sinus rhythm 12 hours later and was totally asymptomatic. An echocardiogram showed normal EF of 55% with moderate-to-severe LVH with a normal stress test on 2017. Because of a CHADS score of 3, she has remained anticoagulated. She had a cholecystectomy in  with no complications. She denies any chest pain or chest discomfort. She remains active. She has had no syncope or near syncope. She is under some personal stress with her father who lives in [facility], who is 80years old and is not well. She is evidently under pressure from her family to either go down to [facility] or bring him back. She states that she \"cries a lot,\" and this is confirmed by her son, Tico Hernandez. She has not been treated for depression. She has never had a myocardial infarction, never had a cardiac catheterization. She has had pain on her right side in the right flank ever since her cholecystectomy. This is a dull discomfort, which is not worse with activity or movement. It has been increasing over the last several weeks. Has no difficulty urinating, no hematuria. Appetite is good. No weight loss. CARDIAC RISK FACTORS:  Hypertension:  Positive. Other Family Members:  Positive. Peripheral Vascular Disease:  Negative. Diabetes:  Negative. Smoking:  Negative. Hyperlipidemia:  Negative. MEDICATIONS AT HOME:  She is on Cartia  mg daily, Colace 100 mg b.i.d., Synthroid 50 mcg daily, Prilosec 20 mg daily, Xarelto 20 mg daily. PAST MEDICAL HISTORY:  1. She had peptic ulcer disease. 2.  Euthyroid, on treatment. 3.  Hypertension. 4.  Asthma. 5.  Negative colonoscopy by Dr. Littie Phalen several years ago. 6.  Status post cholecystectomy in 03/2019. FAMILY HISTORY:  Mother had diabetes and CAD with a pacemaker. Father had diabetes. SOCIAL HISTORY:  She is 75years old, , 5 children. She was accompanied by her son, Sukh George, who has his own grocery store in [place]. Sukh George has a boy at 15 and a girl at 6. Mrs. Hendricks Him has 2 daughters, Lacy Givens, who is her oldest daughter and Patricio Frias, the youngest daughter, is an  at [address] [place] [address]. One son is an  in the [employer], another son has a home in [place] and builds houses. She does not smoke or drink alcohol. She does not exercise, but she remains active. She does admit to much stress with her father, age 80, in [facility] who is very ill and the family is pressuring her to go to [facility].  She admits that she cries at home a fair amount and feels \"sad all the time. \"    REVIEW OF SYSTEMS:  Cardiac as above. Other systems reviewed including constitutional, eyes, ears, nose and throat, cardiovascular, respiratory, GI, , musculoskeletal, integumentary, neurologic, psychiatric, endocrine, hematologic and allergic/immunologic, are negative except for what is described above. No weight loss or weight gain. No change in bowel habits, no blood in stools. No fevers, sweats or chills.     PHYSICAL EXAMINATION:

## 2019-09-26 ENCOUNTER — HOSPITAL ENCOUNTER (OUTPATIENT)
Dept: CT IMAGING | Age: 64
Discharge: HOME | End: 2019-09-28
Payer: MEDICARE

## 2019-09-26 DIAGNOSIS — R10.84 GENERALIZED ABDOMINAL PAIN: ICD-10-CM

## 2019-09-26 PROCEDURE — 6360000004 HC RX CONTRAST MEDICATION: Performed by: INTERNAL MEDICINE

## 2019-09-26 PROCEDURE — 74177 CT ABD & PELVIS W/CONTRAST: CPT

## 2019-09-26 RX ADMIN — IOHEXOL 25 ML: 240 INJECTION, SOLUTION INTRATHECAL; INTRAVASCULAR; INTRAVENOUS; ORAL at 11:34

## 2019-09-26 RX ADMIN — IOPAMIDOL 75 ML: 755 INJECTION, SOLUTION INTRAVENOUS at 11:30

## 2019-09-26 NOTE — PROGRESS NOTES
\"crying at home and feeling sad,\" confirmed by her  and her son secondary to much personal stress with her father being very ill in [facility].    PLAN:  1. Have her see Dr. Alexandra Marcus for possible depression. 2.  CT scan of the abdomen and pelvis in view of her worsening right flank pain. 3.  No change in medications. 4.  We will see in 1 year unless a problem would arise. DISCUSSION:  Mrs. Helio Hernandez from a cardiac standpoint is doing well. She is in sinus rhythm and has a normal EKG. She denies any exertional chest pain or shortness of breath, and she has had no syncope or near syncope and no knowledge of having an atrial fibrillation. She does, I believe, have depression now. We will have her call Dr. Alexandra Marcus concerning her depression to see if she needs any treatment. Again, she is under much personal stress at home, but it does seem to be affecting her fairly significantly. She does have pain in her right flank, I could not feel any masses, but there was no change in position. This occurred after her cholecystectomy and has gradually worsened. I think it is reasonable to do a CT scan of the abdomen and pelvis. We have ordered this for tomorrow. I will plan on seeing her in 1 year. Obviously, if she would have any new symptoms, I would want to see her earlier. Thank you very much for allowing me the privilege of seeing Mrs. Helio Hernandez. If you have any questions on my thoughts, please do not hesitate to contact me.      Sincerely,        Dane Castro    D: 09/25/2019 5:35:57     T: 09/25/2019 5:42:37     [email or web address]  Job#: [id number]   Doc#: [id number]

## 2019-10-11 DIAGNOSIS — I48.91 ATRIAL FIBRILLATION, UNSPECIFIED TYPE (HCC): ICD-10-CM

## 2019-10-11 RX ORDER — RIVAROXABAN 20 MG/1
TABLET, FILM COATED ORAL
Qty: 90 TABLET | Refills: 1 | Status: SHIPPED | OUTPATIENT
Start: 2019-10-11 | End: 2020-04-02

## 2019-10-12 DIAGNOSIS — I48.0 PAROXYSMAL ATRIAL FIBRILLATION (HCC): ICD-10-CM

## 2019-10-13 RX ORDER — DILTIAZEM HYDROCHLORIDE 120 MG/1
CAPSULE, EXTENDED RELEASE ORAL
Qty: 30 CAPSULE | Refills: 5 | Status: SHIPPED | OUTPATIENT
Start: 2019-10-13 | End: 2020-01-23 | Stop reason: DRUGHIGH

## 2019-12-10 DIAGNOSIS — N90.4 VULVAR DYSTROPHY: ICD-10-CM

## 2019-12-10 RX ORDER — TRIAMCINOLONE ACETONIDE 1 MG/G
CREAM TOPICAL
Qty: 80 G | Refills: 2 | Status: SHIPPED | OUTPATIENT
Start: 2019-12-10 | End: 2021-12-14 | Stop reason: ALTCHOICE

## 2020-01-10 ENCOUNTER — OFFICE VISIT (OUTPATIENT)
Dept: FAMILY MEDICINE CLINIC | Age: 65
End: 2020-01-10
Payer: MEDICARE

## 2020-01-10 VITALS
OXYGEN SATURATION: 97 % | DIASTOLIC BLOOD PRESSURE: 60 MMHG | SYSTOLIC BLOOD PRESSURE: 110 MMHG | WEIGHT: 190 LBS | HEART RATE: 70 BPM | BODY MASS INDEX: 32.61 KG/M2

## 2020-01-10 PROBLEM — J18.9 PNEUMONIA DUE TO INFECTIOUS ORGANISM: Status: ACTIVE | Noted: 2017-09-15

## 2020-01-10 PROBLEM — K80.20 BILIARY CALCULUS: Status: ACTIVE | Noted: 2019-03-04

## 2020-01-10 PROCEDURE — 99214 OFFICE O/P EST MOD 30 MIN: CPT | Performed by: INTERNAL MEDICINE

## 2020-01-10 PROCEDURE — G8484 FLU IMMUNIZE NO ADMIN: HCPCS | Performed by: INTERNAL MEDICINE

## 2020-01-10 PROCEDURE — G8427 DOCREV CUR MEDS BY ELIG CLIN: HCPCS | Performed by: INTERNAL MEDICINE

## 2020-01-10 PROCEDURE — 1036F TOBACCO NON-USER: CPT | Performed by: INTERNAL MEDICINE

## 2020-01-10 PROCEDURE — 1090F PRES/ABSN URINE INCON ASSESS: CPT | Performed by: INTERNAL MEDICINE

## 2020-01-10 PROCEDURE — 3017F COLORECTAL CA SCREEN DOC REV: CPT | Performed by: INTERNAL MEDICINE

## 2020-01-10 PROCEDURE — G8417 CALC BMI ABV UP PARAM F/U: HCPCS | Performed by: INTERNAL MEDICINE

## 2020-01-10 PROCEDURE — G8399 PT W/DXA RESULTS DOCUMENT: HCPCS | Performed by: INTERNAL MEDICINE

## 2020-01-10 PROCEDURE — 4040F PNEUMOC VAC/ADMIN/RCVD: CPT | Performed by: INTERNAL MEDICINE

## 2020-01-10 PROCEDURE — 1123F ACP DISCUSS/DSCN MKR DOCD: CPT | Performed by: INTERNAL MEDICINE

## 2020-01-10 RX ORDER — ALBUTEROL SULFATE 2.5 MG/3ML
2.5 SOLUTION RESPIRATORY (INHALATION)
COMMUNITY
Start: 2019-05-03 | End: 2020-04-13 | Stop reason: SDUPTHER

## 2020-01-10 RX ORDER — PREDNISONE 20 MG/1
TABLET ORAL
COMMUNITY
Start: 2020-01-06 | End: 2020-01-15

## 2020-01-10 RX ORDER — ALBUTEROL SULFATE 90 UG/1
2 AEROSOL, METERED RESPIRATORY (INHALATION) EVERY 4 HOURS PRN
Qty: 1 INHALER | Refills: 1 | Status: SHIPPED | OUTPATIENT
Start: 2020-01-10 | End: 2021-12-14 | Stop reason: ALTCHOICE

## 2020-01-10 RX ORDER — LEVOFLOXACIN 500 MG/1
500 TABLET, FILM COATED ORAL DAILY
Qty: 5 TABLET | Refills: 0 | Status: SHIPPED | OUTPATIENT
Start: 2020-01-10 | End: 2020-01-15

## 2020-01-10 RX ORDER — LEVOTHYROXINE SODIUM 0.05 MG/1
50 TABLET ORAL DAILY
Qty: 90 TABLET | Refills: 1 | Status: SHIPPED | OUTPATIENT
Start: 2020-01-10 | End: 2020-09-15 | Stop reason: SDUPTHER

## 2020-01-10 RX ORDER — CITALOPRAM 10 MG/1
10 TABLET ORAL DAILY
Qty: 30 TABLET | Refills: 2 | Status: SHIPPED | OUTPATIENT
Start: 2020-01-10 | End: 2020-12-21

## 2020-01-10 RX ORDER — OMEPRAZOLE 20 MG/1
20 CAPSULE, DELAYED RELEASE ORAL DAILY
Qty: 90 CAPSULE | Refills: 1 | Status: SHIPPED | OUTPATIENT
Start: 2020-01-10 | End: 2020-01-15

## 2020-01-10 ASSESSMENT — ENCOUNTER SYMPTOMS
VOICE CHANGE: 0
TROUBLE SWALLOWING: 0
SHORTNESS OF BREATH: 0
HOARSE VOICE: 0
SORE THROAT: 0
COUGH: 1
SINUS PRESSURE: 0
WHEEZING: 1
NAUSEA: 0
RHINORRHEA: 0
ABDOMINAL PAIN: 0
CHEST TIGHTNESS: 0

## 2020-01-10 ASSESSMENT — PATIENT HEALTH QUESTIONNAIRE - PHQ9
SUM OF ALL RESPONSES TO PHQ QUESTIONS 1-9: 0
2. FEELING DOWN, DEPRESSED OR HOPELESS: 0
SUM OF ALL RESPONSES TO PHQ9 QUESTIONS 1 & 2: 0
SUM OF ALL RESPONSES TO PHQ QUESTIONS 1-9: 0
1. LITTLE INTEREST OR PLEASURE IN DOING THINGS: 0

## 2020-01-10 NOTE — PROGRESS NOTES
HPI Notes    Name: Conchis Barbosa  : 1955         Chief Complaint:     Chief Complaint   Patient presents with    Hypertension    Hypothyroidism     Last TSH was 3.11 on 19    Gastroesophageal Reflux    ED Follow-up     Was in Er [facility] for low Oxygen. was positive for Flu and bronchitis and pneumonia, states she is doing alot better now.  Asthma     on [address] gap. History of Present Illness:        Jessica Nation is a pleasant Syriac speaking woman who presents to office to follow-up for hypertension, hypothyroidism, GERD, asthma. She is accompanied by her daughter Mike Courtney who helps with interpretation . She states that she was at [facility] ER on 20 for evaluation of pneumonia with hypoxia . Was positive for Influenza. States was not started on Tamiflu  Was discharged home with Anbx and Prednisone. She has a h/o asthma. She never smoked. Needs an inhaler. Her old inhaler . States still coughing up yellowish sputum, feels SOB, has wheezing. Has weakness, fatigue. Reports no fevers or chills    Has a history of paroxysmal atrial fibrillation. Anticoagulated with Xarelto. Reports no chest pain, no palpitations. Follows up with Dr. Maida Garcia. Appointment was on 2019. Dr. Maida Garcia was concerned about possible depression with Jessica Nation. Jessica Nation states she has a lot going on with her ill father who lives  in [facility]. He is 80 and his health is failing. Jessica Nation would like to try some medications to control her    Jessica Nation confirms that she is compliant with her  thyroid medication. She has  an increase in fatigue but believes it is due to recent flu and pneumonia, has no  constipation,no increased skin dryness, or increased lower extremity edema. The last TSH was 3.11 on 19      Hypertension   This is a chronic problem. The current episode started more than 1 year ago. The problem is unchanged. The problem is controlled. Associated symptoms include malaise/fatigue.  Pertinent negatives Karen Velazquez MD   XARELTO 20 MG TABS tablet take 1 tablet by mouth once daily WITH BREAKFAST 10/11/19  Yes Jesus Abad MD   docusate sodium (COLACE) 100 MG capsule Take 1 capsule by mouth 2 times daily 3/26/18  Yes Thompson Draper MD   ciclopirox (PENLAC) 8 % solution Apply topically nightly. 3/26/18  Yes Thompson Draper MD   hydrocortisone (ANUSOL-HC) 25 MG suppository Place 1 suppository rectally 2 times daily 2/9/18  Yes Thompson Draper MD        Allergies:       Patient has no known allergies. Social History:     Tobacco: reports that she has never smoked. She has never used smokeless tobacco.  Alcohol:      reports no history of alcohol use. Drug Use:  reports no history of drug use. Family History:     Family History   Problem Relation Age of Onset    Diabetes Father        Review of Systems:         Review of Systems   Constitutional: Positive for fatigue and malaise/fatigue. Negative for activity change, appetite change, chills, diaphoresis, fever and weight loss. HENT: Negative for congestion, ear discharge, ear pain, hoarse voice, rhinorrhea, sinus pressure, sore throat, trouble swallowing and voice change. Eyes: Negative for visual disturbance. Respiratory: Positive for cough and wheezing. Negative for chest tightness and shortness of breath. Cardiovascular: Negative for chest pain, palpitations and leg swelling. Gastrointestinal: Negative for abdominal pain, dysphagia and nausea. Genitourinary: Negative for dysuria. Skin: Negative for rash. Neurological: Positive for weakness. Negative for dizziness and headaches. Psychiatric/Behavioral: Positive for dysphoric mood. The patient is not nervous/anxious. Physical Exam:     Vitals:  /60 (Site: Left Upper Arm, Position: Sitting, Cuff Size: Medium Adult)   Pulse 70   Wt 190 lb (86.2 kg)   SpO2 97%   BMI 32.61 kg/m²       Physical Exam  Vitals signs reviewed.    Constitutional:       General: She is not in acute distress. Appearance: She is well-developed. HENT:      Head: Normocephalic and atraumatic. Right Ear: Tympanic membrane, ear canal and external ear normal. There is no impacted cerumen. Left Ear: Tympanic membrane, ear canal and external ear normal. There is no impacted cerumen. Nose: No congestion or rhinorrhea. Mouth/Throat:      Mouth: Mucous membranes are moist.      Pharynx: Posterior oropharyngeal erythema present. Eyes:      General: No scleral icterus. Right eye: No discharge. Left eye: No discharge. Neck:      Thyroid: No thyromegaly. Cardiovascular:      Rate and Rhythm: Normal rate and regular rhythm. Heart sounds: Normal heart sounds. No murmur. Pulmonary:      Effort: Pulmonary effort is normal.      Breath sounds: Wheezing and rhonchi present. No rales. Abdominal:      General: Bowel sounds are normal. There is no distension. Palpations: Abdomen is soft. There is no mass. Tenderness: There is no tenderness. Musculoskeletal: Normal range of motion. Right lower leg: No edema. Left lower leg: No edema. Lymphadenopathy:      Cervical: No cervical adenopathy. Skin:     General: Skin is warm and dry. Coloration: Skin is not pale. Findings: No rash. Neurological:      General: No focal deficit present. Mental Status: She is alert and oriented to person, place, and time. Cranial Nerves: No cranial nerve deficit.    Psychiatric:         Behavior: Behavior normal.         Judgment: Judgment normal.               Data:     Lab Results   Component Value Date     09/19/2019    K 4.7 09/19/2019     09/19/2019    CO2 31 09/19/2019    BUN 13 09/19/2019    CREATININE 0.72 09/19/2019    GLUCOSE 106 09/19/2019    PROT 8.9 09/19/2019    LABALBU 4.2 09/19/2019    BILITOT 0.43 09/19/2019    ALKPHOS 117 09/19/2019    AST 13 09/19/2019    ALT 16 09/19/2019     Lab Results   Component Value Date    WBC 6.3 09/19/2019    RBC 5.01 09/19/2019    HGB 13.8 09/19/2019    HCT 42.6 09/19/2019    MCV 85.0 09/19/2019    MCH 27.5 09/19/2019    MCHC 32.4 09/19/2019    RDW 15.1 09/19/2019     09/19/2019    MPV NOT REPORTED 09/19/2019     Lab Results   Component Value Date    TSH 3.11 09/19/2019     Lab Results   Component Value Date    CHOL 167 09/19/2019    HDL 60 09/19/2019    LABA1C 5.6 07/08/2019          Assessment & Plan        Diagnosis Orders   1. Essential hypertension   Blood pressure is stable, on Cartia    2. Acute bronchitis, unspecified organism   She is still symptomatic and has rhonchi on examination. Prescribed course of Levaquin, she is to finish prednisone levofloxacin (LEVAQUIN) 500 MG tablet   3. GERD without esophagitis   Tums controlled, continue omeprazole omeprazole (PRILOSEC) 20 MG delayed release capsule    citalopram (CELEXA) 10 MG tablet   4. Acquired hypothyroidism   TSH normal, continue Synthroid levothyroxine (SYNTHROID) 50 MCG tablet   5. Current moderate episode of major depressive disorder, unspecified whether recurrent ([facility] [address]   Patient would like to try low-dose Celexa, follow-up in 4 weeks    6. Moderate persistent asthma with exacerbation   Reordered albuterol inhaler, also ordered inhaled steroids Advair. albuterol sulfate HFA (PROVENTIL HFA) 108 (90 Base) MCG/ACT inhaler    fluticasone-salmeterol (ADVAIR HFA) 115-21 MCG/ACT inhaler   7. Paroxysmal atrial fibrillation (HCC)   In normal sinus rhythm, anticoagulated with Xarelto, on Cartia, rate is stable    8.  At high risk for falls                     Completed Refills   Requested Prescriptions     Signed Prescriptions Disp Refills    omeprazole (PRILOSEC) 20 MG delayed release capsule 90 capsule 1     Sig: Take 1 capsule by mouth daily    levothyroxine (SYNTHROID) 50 MCG tablet 90 tablet 1     Sig: Take 1 tablet by mouth Daily    albuterol sulfate HFA (PROVENTIL HFA) 108 (90 Base) MCG/ACT inhaler 1 Inhaler 1     Sig: Inhale 2 puffs Base) MCG/ACT inhaler 1 Inhaler 1     Sig: Inhale 2 puffs into the lungs every 4 hours as needed for Wheezing or Shortness of Breath    fluticasone-salmeterol (ADVAIR HFA) 115-21 MCG/ACT inhaler 1 Inhaler 3     Sig: Inhale 2 puffs into the lungs 2 times daily    citalopram (CELEXA) 10 MG tablet 30 tablet 2     Sig: Take 1 tablet by mouth daily    levofloxacin (LEVAQUIN) 500 MG tablet 5 tablet 0     Sig: Take 1 tablet by mouth daily for 5 days           On the basis of positive falls risk screening, assessment and plan is as follows: home safety tips provided.

## 2020-01-15 ENCOUNTER — APPOINTMENT (OUTPATIENT)
Dept: GENERAL RADIOLOGY | Age: 65
DRG: 202 | End: 2020-01-15
Payer: MEDICARE

## 2020-01-15 ENCOUNTER — HOSPITAL ENCOUNTER (INPATIENT)
Age: 65
LOS: 2 days | Discharge: HOME | DRG: 202 | End: 2020-01-17
Attending: INTERNAL MEDICINE | Admitting: INTERNAL MEDICINE
Payer: MEDICARE

## 2020-01-15 PROBLEM — J45.901 ACUTE ASTHMA EXACERBATION: Status: ACTIVE | Noted: 2020-01-15

## 2020-01-15 LAB
ABSOLUTE EOS #: 0 K/UL (ref 0–0.4)
ABSOLUTE IMMATURE GRANULOCYTE: ABNORMAL K/UL (ref 0–0.3)
ABSOLUTE LYMPH #: 1.7 K/UL (ref 1–4.8)
ABSOLUTE MONO #: 1.1 K/UL (ref 0–1)
ALBUMIN SERPL-MCNC: 3.6 G/DL (ref 3.5–5.2)
ALBUMIN/GLOBULIN RATIO: ABNORMAL (ref 1–2.5)
ALP BLD-CCNC: 102 U/L (ref 35–104)
ALT SERPL-CCNC: 57 U/L (ref 5–33)
ANION GAP SERPL CALCULATED.3IONS-SCNC: 11 MMOL/L (ref 9–17)
AST SERPL-CCNC: 13 U/L
BASOPHILS # BLD: 0 % (ref 0–2)
BASOPHILS ABSOLUTE: 0 K/UL (ref 0–0.2)
BILIRUB SERPL-MCNC: 0.3 MG/DL (ref 0.3–1.2)
BUN BLDV-MCNC: 28 MG/DL (ref 8–23)
BUN/CREAT BLD: 28 (ref 9–20)
CALCIUM SERPL-MCNC: 9.4 MG/DL (ref 8.6–10.4)
CHLORIDE BLD-SCNC: 98 MMOL/L (ref 98–107)
CO2: 27 MMOL/L (ref 20–31)
CREAT SERPL-MCNC: 1.01 MG/DL (ref 0.5–0.9)
DIFFERENTIAL TYPE: YES
EOSINOPHILS RELATIVE PERCENT: 0 % (ref 0–5)
GFR AFRICAN AMERICAN: >60 ML/MIN
GFR NON-AFRICAN AMERICAN: 55 ML/MIN
GFR SERPL CREATININE-BSD FRML MDRD: ABNORMAL ML/MIN/{1.73_M2}
GFR SERPL CREATININE-BSD FRML MDRD: ABNORMAL ML/MIN/{1.73_M2}
GLUCOSE BLD-MCNC: 130 MG/DL (ref 70–99)
GLUCOSE BLD-MCNC: 173 MG/DL (ref 65–99)
GLUCOSE BLD-MCNC: 185 MG/DL (ref 65–99)
HCT VFR BLD CALC: 43.5 % (ref 36–46)
HEMOGLOBIN: 14 G/DL (ref 12–16)
IMMATURE GRANULOCYTES: ABNORMAL %
LYMPHOCYTES # BLD: 17 % (ref 15–40)
MCH RBC QN AUTO: 27 PG (ref 26–34)
MCHC RBC AUTO-ENTMCNC: 32.2 G/DL (ref 31–37)
MCV RBC AUTO: 83.7 FL (ref 80–100)
MONOCYTES # BLD: 11 % (ref 4–8)
NRBC AUTOMATED: ABNORMAL PER 100 WBC
PDW BLD-RTO: 15.1 % (ref 12.1–15.2)
PLATELET # BLD: 340 K/UL (ref 140–450)
PLATELET ESTIMATE: ABNORMAL
PMV BLD AUTO: ABNORMAL FL (ref 6–12)
POTASSIUM SERPL-SCNC: 4.3 MMOL/L (ref 3.7–5.3)
RBC # BLD: 5.19 M/UL (ref 4–5.2)
RBC # BLD: ABNORMAL 10*6/UL
SEG NEUTROPHILS: 72 % (ref 47–75)
SEGMENTED NEUTROPHILS ABSOLUTE COUNT: 7.3 K/UL (ref 2.5–7)
SODIUM BLD-SCNC: 136 MMOL/L (ref 135–144)
TOTAL PROTEIN: 7.5 G/DL (ref 6.4–8.3)
WBC # BLD: 10.1 K/UL (ref 3.5–11)
WBC # BLD: ABNORMAL 10*3/UL

## 2020-01-15 PROCEDURE — 94640 AIRWAY INHALATION TREATMENT: CPT

## 2020-01-15 PROCEDURE — 82947 ASSAY GLUCOSE BLOOD QUANT: CPT

## 2020-01-15 PROCEDURE — 71045 X-RAY EXAM CHEST 1 VIEW: CPT

## 2020-01-15 PROCEDURE — 2500000003 HC RX 250 WO HCPCS: Performed by: INTERNAL MEDICINE

## 2020-01-15 PROCEDURE — 99285 EMERGENCY DEPT VISIT HI MDM: CPT

## 2020-01-15 PROCEDURE — 36415 COLL VENOUS BLD VENIPUNCTURE: CPT

## 2020-01-15 PROCEDURE — 6360000002 HC RX W HCPCS: Performed by: INTERNAL MEDICINE

## 2020-01-15 PROCEDURE — 96361 HYDRATE IV INFUSION ADD-ON: CPT

## 2020-01-15 PROCEDURE — 94664 DEMO&/EVAL PT USE INHALER: CPT

## 2020-01-15 PROCEDURE — 2580000003 HC RX 258: Performed by: INTERNAL MEDICINE

## 2020-01-15 PROCEDURE — 1200000000 HC SEMI PRIVATE

## 2020-01-15 PROCEDURE — 94761 N-INVAS EAR/PLS OXIMETRY MLT: CPT

## 2020-01-15 PROCEDURE — 6370000000 HC RX 637 (ALT 250 FOR IP): Performed by: INTERNAL MEDICINE

## 2020-01-15 PROCEDURE — 85025 COMPLETE CBC W/AUTO DIFF WBC: CPT

## 2020-01-15 PROCEDURE — 80053 COMPREHEN METABOLIC PANEL: CPT

## 2020-01-15 PROCEDURE — 96374 THER/PROPH/DIAG INJ IV PUSH: CPT

## 2020-01-15 RX ORDER — ONDANSETRON 2 MG/ML
4 INJECTION INTRAMUSCULAR; INTRAVENOUS EVERY 6 HOURS PRN
Status: DISCONTINUED | OUTPATIENT
Start: 2020-01-15 | End: 2020-01-17 | Stop reason: HOSPADM

## 2020-01-15 RX ORDER — BENZONATATE 100 MG/1
100 CAPSULE ORAL EVERY 8 HOURS PRN
Status: DISCONTINUED | OUTPATIENT
Start: 2020-01-15 | End: 2020-01-17 | Stop reason: HOSPADM

## 2020-01-15 RX ORDER — SODIUM CHLORIDE 0.9 % (FLUSH) 0.9 %
10 SYRINGE (ML) INJECTION PRN
Status: DISCONTINUED | OUTPATIENT
Start: 2020-01-15 | End: 2020-01-17 | Stop reason: HOSPADM

## 2020-01-15 RX ORDER — HYDROCORTISONE ACETATE 25 MG/1
25 SUPPOSITORY RECTAL 2 TIMES DAILY
Status: DISCONTINUED | OUTPATIENT
Start: 2020-01-15 | End: 2020-01-15 | Stop reason: CLARIF

## 2020-01-15 RX ORDER — LEVOTHYROXINE SODIUM 0.03 MG/1
50 TABLET ORAL DAILY
Status: DISCONTINUED | OUTPATIENT
Start: 2020-01-16 | End: 2020-01-17 | Stop reason: HOSPADM

## 2020-01-15 RX ORDER — IPRATROPIUM BROMIDE AND ALBUTEROL SULFATE 2.5; .5 MG/3ML; MG/3ML
1 SOLUTION RESPIRATORY (INHALATION) ONCE
Status: COMPLETED | OUTPATIENT
Start: 2020-01-15 | End: 2020-01-15

## 2020-01-15 RX ORDER — METHYLPREDNISOLONE SODIUM SUCCINATE 125 MG/2ML
80 INJECTION, POWDER, LYOPHILIZED, FOR SOLUTION INTRAMUSCULAR; INTRAVENOUS EVERY 8 HOURS
Status: DISCONTINUED | OUTPATIENT
Start: 2020-01-15 | End: 2020-01-16

## 2020-01-15 RX ORDER — SODIUM CHLORIDE 9 MG/ML
INJECTION, SOLUTION INTRAVENOUS CONTINUOUS
Status: DISCONTINUED | OUTPATIENT
Start: 2020-01-15 | End: 2020-01-16

## 2020-01-15 RX ORDER — DEXTROSE MONOHYDRATE 25 G/50ML
12.5 INJECTION, SOLUTION INTRAVENOUS PRN
Status: DISCONTINUED | OUTPATIENT
Start: 2020-01-15 | End: 2020-01-17 | Stop reason: HOSPADM

## 2020-01-15 RX ORDER — IPRATROPIUM BROMIDE AND ALBUTEROL SULFATE 2.5; .5 MG/3ML; MG/3ML
1 SOLUTION RESPIRATORY (INHALATION)
Status: DISCONTINUED | OUTPATIENT
Start: 2020-01-15 | End: 2020-01-17 | Stop reason: HOSPADM

## 2020-01-15 RX ORDER — 0.9 % SODIUM CHLORIDE 0.9 %
1000 INTRAVENOUS SOLUTION INTRAVENOUS ONCE
Status: COMPLETED | OUTPATIENT
Start: 2020-01-15 | End: 2020-01-15

## 2020-01-15 RX ORDER — ACETAMINOPHEN 325 MG/1
650 TABLET ORAL EVERY 4 HOURS PRN
Status: DISCONTINUED | OUTPATIENT
Start: 2020-01-15 | End: 2020-01-17 | Stop reason: HOSPADM

## 2020-01-15 RX ORDER — METHYLPREDNISOLONE SODIUM SUCCINATE 125 MG/2ML
125 INJECTION, POWDER, LYOPHILIZED, FOR SOLUTION INTRAMUSCULAR; INTRAVENOUS ONCE
Status: COMPLETED | OUTPATIENT
Start: 2020-01-15 | End: 2020-01-15

## 2020-01-15 RX ORDER — DEXTROSE MONOHYDRATE 50 MG/ML
100 INJECTION, SOLUTION INTRAVENOUS PRN
Status: DISCONTINUED | OUTPATIENT
Start: 2020-01-15 | End: 2020-01-17 | Stop reason: HOSPADM

## 2020-01-15 RX ORDER — SODIUM CHLORIDE 0.9 % (FLUSH) 0.9 %
10 SYRINGE (ML) INJECTION EVERY 12 HOURS SCHEDULED
Status: DISCONTINUED | OUTPATIENT
Start: 2020-01-15 | End: 2020-01-17 | Stop reason: HOSPADM

## 2020-01-15 RX ORDER — NICOTINE POLACRILEX 4 MG
15 LOZENGE BUCCAL PRN
Status: DISCONTINUED | OUTPATIENT
Start: 2020-01-15 | End: 2020-01-17 | Stop reason: HOSPADM

## 2020-01-15 RX ORDER — CITALOPRAM 20 MG/1
10 TABLET ORAL DAILY
Status: DISCONTINUED | OUTPATIENT
Start: 2020-01-15 | End: 2020-01-17 | Stop reason: HOSPADM

## 2020-01-15 RX ORDER — DILTIAZEM HYDROCHLORIDE 120 MG/1
120 CAPSULE, COATED, EXTENDED RELEASE ORAL DAILY
Status: DISCONTINUED | OUTPATIENT
Start: 2020-01-16 | End: 2020-01-17 | Stop reason: HOSPADM

## 2020-01-15 RX ADMIN — MOMETASONE FUROATE AND FORMOTEROL FUMARATE DIHYDRATE 2 PUFF: 200; 5 AEROSOL RESPIRATORY (INHALATION) at 20:45

## 2020-01-15 RX ADMIN — BENZONATATE 100 MG: 100 CAPSULE ORAL at 15:08

## 2020-01-15 RX ADMIN — DOXYCYCLINE 100 MG: 100 INJECTION, POWDER, LYOPHILIZED, FOR SOLUTION INTRAVENOUS at 15:07

## 2020-01-15 RX ADMIN — SODIUM CHLORIDE: 9 INJECTION, SOLUTION INTRAVENOUS at 15:07

## 2020-01-15 RX ADMIN — CITALOPRAM HYDROBROMIDE 10 MG: 20 TABLET ORAL at 15:08

## 2020-01-15 RX ADMIN — SODIUM CHLORIDE 1000 ML: 9 INJECTION, SOLUTION INTRAVENOUS at 12:10

## 2020-01-15 RX ADMIN — INSULIN LISPRO 1 UNITS: 100 INJECTION, SOLUTION INTRAVENOUS; SUBCUTANEOUS at 21:07

## 2020-01-15 RX ADMIN — INSULIN LISPRO 1 UNITS: 100 INJECTION, SOLUTION INTRAVENOUS; SUBCUTANEOUS at 17:10

## 2020-01-15 RX ADMIN — IPRATROPIUM BROMIDE AND ALBUTEROL SULFATE 1 AMPULE: .5; 3 SOLUTION RESPIRATORY (INHALATION) at 12:10

## 2020-01-15 RX ADMIN — METHYLPREDNISOLONE SODIUM SUCCINATE 80 MG: 125 INJECTION, POWDER, FOR SOLUTION INTRAMUSCULAR; INTRAVENOUS at 18:41

## 2020-01-15 RX ADMIN — METHYLPREDNISOLONE SODIUM SUCCINATE 125 MG: 125 INJECTION, POWDER, FOR SOLUTION INTRAMUSCULAR; INTRAVENOUS at 12:10

## 2020-01-15 RX ADMIN — IPRATROPIUM BROMIDE AND ALBUTEROL SULFATE 1 AMPULE: .5; 3 SOLUTION RESPIRATORY (INHALATION) at 20:44

## 2020-01-15 RX ADMIN — IPRATROPIUM BROMIDE AND ALBUTEROL SULFATE 1 AMPULE: .5; 3 SOLUTION RESPIRATORY (INHALATION) at 16:45

## 2020-01-15 ASSESSMENT — PAIN SCALES - GENERAL
PAINLEVEL_OUTOF10: 0
PAINLEVEL_OUTOF10: 0

## 2020-01-15 NOTE — ED PROVIDER NOTES
[address]      Pt Name: Izzy Greenwood  MRN: [id number]  [place] 1955  Date of evaluation: 1/15/2020  Provider: Elmer Rodriguez MD    CHIEF COMPLAINT       Chief Complaint   Patient presents with    Shortness of Breath     pt just finished antibiotic for pneumonia and for last five days she has been feeling worse         HISTORY OF PRESENT ILLNESS   (Location/Symptom, Timing/Onset, Context/Setting, Quality, Duration, Modifying Factors, Severity)  Note limiting factors. Izzy Greenwood is a 75 y.o. female who is Tamaz[place] speaking, who has a history of asthma, GERD, paroxysmal a-fib, hypothyroidism, and a recent hx of pneumonia, influenza A on 1/5/2020 diagnosed at [facility], presents to the emergency department for evaluation and management of pneumonia and increasing weakness. Her daughter who is at bedside stated that her blood pressure at home was SBP 77.  She was first treated for the pneumonia with an unknown antibiotic and then subsequently changed to Levaquin which she finished yesterday. She last had a note DuoNeb treatments 2 days ago. She used her inhaler this morning. She states that she has not been eating very well and has been feeling weak. She refused translation services and demanded that she usee her daughter for translation. [facility]    Nursing Notes were reviewed. REVIEW OF SYSTEMS    (2-9 systems for level 4, 10 or more for level 5)       REVIEW OF SYSTEMS    Constitutional: Positive for fatigue, Negative for fever. HENT: Negative for dental problem, ear pain and sore throat. Respiratory: Positive for productive cough, wheezing, Negative for chest tightness and shortness of breath. Cardiovascular: Negative for chest pain, palpitations and leg swelling. Gastrointestinal: Positive for anorexia, Negative for abdominal distention, abdominal pain, diarrhea, nausea and vomiting.    Genitourinary: Negative for difficulty urinating, Physically abused: None     Forced sexual activity: None   Other Topics Concern    None   Social History Narrative    None       SCREENINGS    [place] Coma Scale  Eye Opening: Spontaneous  Best Verbal Response: Oriented  Best Motor Response: Obeys commands  Eric Coma Scale Score: 15        PHYSICAL EXAM    (up to 7 for level 4, 8 or more for level 5)     ED Triage Vitals [01/15/20 1140]   BP Temp Temp Source Pulse Resp SpO2 Height Weight   (!) 111/57 98.6 °F (37 °C) Oral 65 20 96 % 5' 6\" (1.676 m) 190 lb 1.6 oz (86.2 kg)       Physical Exam  Physical Exam   Constitutional:  Appears well-developed and well-nourished. Lethargic but No distress noted. Non toxic in appearance. HENT:     Head: Normocephalic and atraumatic. Right Ear: External ear normal. TM normal pearly light reflex. LeftEar: External ear normal. TM normal pearly light reflex. Nose: Nose normal.     Mouth/Throat: Oropharynx is clear and mucosa moist. No oropharyngeal exudate noted. Posterior pharynx is pink and noninjected. Eyes: Conjunctivae and EOM are normal. Pupils are equal,round, and reactive to light. No scleral icterus. Neck: Normal range of motion. Neck supple. No tracheal deviation present. Cardiovascular: Normal rate, regular rhythm, normal heartsounds and intact distal pulses. Exam reveals no gallop or friction rub. No murmur heard. Pulmonary/Chest: Effort normal. breath sounds are symmetric stephenie decreased in bases with wheezes and rhonchi throughoutt. No respiratory distress. There are no rales. No tenderness is exhibited upon palpation of the chest wall. Abdominal: Soft. Bowel sounds are normal. No distension or no mass exhibitted. There is no tenderness, rebound, rigidity or guarding. Genitourinary:   No CVA tenderness noted on examination. Musculoskeletal: Normal range of motion. No edema, tenderness or deformity. Lymphadenopathy:  No cervical adenopathy. Neurological:   Lethargic but easily arousable. Medications   diltiazem (CARDIZEM CD) extended release capsule 120 mg (120 mg Oral Given 1/16/20 0928)   ciclopirox (PENLAC) 8 % solution ( Topical Not Given 1/15/20 2240)   citalopram (CELEXA) tablet 10 mg (10 mg Oral Given 1/16/20 0928)   mometasone-formoterol (DULERA) 200-5 MCG/ACT inhaler 2 puff (2 puffs Inhalation Given 1/16/20 0910)   levothyroxine (SYNTHROID) tablet 50 mcg (50 mcg Oral Given 1/16/20 0538)   rivaroxaban (XARELTO) tablet 20 mg (has no administration in time range)   sodium chloride flush 0.9 % injection 10 mL (10 mLs Intravenous Given 1/16/20 0928)   sodium chloride flush 0.9 % injection 10 mL (has no administration in time range)   magnesium hydroxide (MILK OF MAGNESIA) 400 MG/5ML suspension 30 mL (has no administration in time range)   ondansetron (ZOFRAN) injection 4 mg (has no administration in time range)   acetaminophen (TYLENOL) tablet 650 mg (has no administration in time range)   ipratropium-albuterol (DUONEB) nebulizer solution 1 ampule (1 ampule Inhalation Given 1/16/20 0908)   doxycycline (VIBRAMYCIN) 100 mg in dextrose 5 % 100 mL IVPB (0 mg Intravenous Stopped 1/16/20 0331)   glucose (GLUTOSE) 40 % oral gel 15 g (has no administration in time range)   dextrose 50 % IV solution (has no administration in time range)   glucagon (rDNA) injection 1 mg (has no administration in time range)   dextrose 5 % solution (has no administration in time range)   insulin lispro (HUMALOG) injection vial 0-6 Units (1 Units Subcutaneous Given 1/16/20 7728)   insulin lispro (HUMALOG) injection vial 0-3 Units (1 Units Subcutaneous Given 1/15/20 5227)   benzonatate (TESSALON) capsule 100 mg (100 mg Oral Given 1/15/20 1508)   methylPREDNISolone sodium (SOLU-MEDROL) injection 40 mg (40 mg Intravenous Given 1/16/20 0928)   ipratropium-albuterol (DUONEB) nebulizer solution 1 ampule (1 ampule Inhalation Given 1/15/20 1210)   0.9 % sodium chloride bolus (0 mLs Intravenous Stopped 1/15/20 1333) methylPREDNISolone sodium (SOLU-MEDROL) injection 125 mg (125 mg Intravenous Given 1/15/20 1210)       New Prescriptions from this visit:    Current Discharge Medication List          Follow-up:  MD Bibi Marie [facility] [address]          Jia Ashford, [address] [place] [address]  [phone]              Final Impression:   1. Dehydration    2. Bronchitis    3. Exacerbation of intermittent asthma, unspecified asthma severity    4. History of influenza    5. Failure of outpatient treatment    6. Acute renal insufficiency               (Please note that portions of this note werecompleted with a voice recognition program.  Efforts were made to edit the dictations but occasionally words are mis-transcribed.)    FINAL IMPRESSION      1. Dehydration    2. Bronchitis    3. Exacerbation of intermittent asthma, unspecified asthma severity    4. History of influenza    5. Failure of outpatient treatment    6.  Acute renal insufficiency          DISPOSITION/PLAN   DISPOSITION        PATIENT REFERRED TO:  MD Bibi Marie [facility] [address]          Jia Ashford MD  [facility] [address] [phone]            DISCHARGE MEDICATIONS:  Current Discharge Medication List             (Please note that portions of this note were completed with a voice recognition program.  Efforts were made to edit the dictations but occasionally words are mis-transcribed.)    Mikhail Blair MD (electronically signed)  Attending Emergency Physician            Mikhail Blair MD  01/16/20 [id number] Edson Silverman MD  01/16/20 1129

## 2020-01-16 LAB
ANION GAP SERPL CALCULATED.3IONS-SCNC: 13 MMOL/L (ref 9–17)
BUN BLDV-MCNC: 16 MG/DL (ref 8–23)
BUN/CREAT BLD: 24 (ref 9–20)
CALCIUM SERPL-MCNC: 9.5 MG/DL (ref 8.6–10.4)
CHLORIDE BLD-SCNC: 104 MMOL/L (ref 98–107)
CO2: 23 MMOL/L (ref 20–31)
CREAT SERPL-MCNC: 0.68 MG/DL (ref 0.5–0.9)
GFR AFRICAN AMERICAN: >60 ML/MIN
GFR NON-AFRICAN AMERICAN: >60 ML/MIN
GFR SERPL CREATININE-BSD FRML MDRD: ABNORMAL ML/MIN/{1.73_M2}
GFR SERPL CREATININE-BSD FRML MDRD: ABNORMAL ML/MIN/{1.73_M2}
GLUCOSE BLD-MCNC: 157 MG/DL (ref 65–99)
GLUCOSE BLD-MCNC: 159 MG/DL (ref 65–99)
GLUCOSE BLD-MCNC: 186 MG/DL (ref 65–99)
GLUCOSE BLD-MCNC: 192 MG/DL (ref 70–99)
POTASSIUM SERPL-SCNC: 4.5 MMOL/L (ref 3.7–5.3)
SODIUM BLD-SCNC: 140 MMOL/L (ref 135–144)

## 2020-01-16 PROCEDURE — 2580000003 HC RX 258: Performed by: INTERNAL MEDICINE

## 2020-01-16 PROCEDURE — 82947 ASSAY GLUCOSE BLOOD QUANT: CPT

## 2020-01-16 PROCEDURE — 2500000003 HC RX 250 WO HCPCS: Performed by: INTERNAL MEDICINE

## 2020-01-16 PROCEDURE — 94761 N-INVAS EAR/PLS OXIMETRY MLT: CPT

## 2020-01-16 PROCEDURE — 1200000000 HC SEMI PRIVATE

## 2020-01-16 PROCEDURE — 36415 COLL VENOUS BLD VENIPUNCTURE: CPT

## 2020-01-16 PROCEDURE — 94640 AIRWAY INHALATION TREATMENT: CPT

## 2020-01-16 PROCEDURE — 6360000002 HC RX W HCPCS: Performed by: INTERNAL MEDICINE

## 2020-01-16 PROCEDURE — 80048 BASIC METABOLIC PNL TOTAL CA: CPT

## 2020-01-16 PROCEDURE — 6370000000 HC RX 637 (ALT 250 FOR IP): Performed by: INTERNAL MEDICINE

## 2020-01-16 RX ORDER — METHYLPREDNISOLONE SODIUM SUCCINATE 40 MG/ML
40 INJECTION, POWDER, LYOPHILIZED, FOR SOLUTION INTRAMUSCULAR; INTRAVENOUS EVERY 8 HOURS
Status: DISCONTINUED | OUTPATIENT
Start: 2020-01-16 | End: 2020-01-17 | Stop reason: HOSPADM

## 2020-01-16 RX ADMIN — METHYLPREDNISOLONE SODIUM SUCCINATE 40 MG: 40 INJECTION, POWDER, FOR SOLUTION INTRAMUSCULAR; INTRAVENOUS at 09:28

## 2020-01-16 RX ADMIN — Medication 10 ML: at 16:17

## 2020-01-16 RX ADMIN — SODIUM CHLORIDE: 9 INJECTION, SOLUTION INTRAVENOUS at 00:48

## 2020-01-16 RX ADMIN — INSULIN LISPRO 1 UNITS: 100 INJECTION, SOLUTION INTRAVENOUS; SUBCUTANEOUS at 12:09

## 2020-01-16 RX ADMIN — Medication 10 ML: at 09:28

## 2020-01-16 RX ADMIN — ACETAMINOPHEN 650 MG: 325 TABLET, FILM COATED ORAL at 15:29

## 2020-01-16 RX ADMIN — CITALOPRAM HYDROBROMIDE 10 MG: 20 TABLET ORAL at 09:28

## 2020-01-16 RX ADMIN — DILTIAZEM HYDROCHLORIDE 120 MG: 120 CAPSULE, COATED, EXTENDED RELEASE ORAL at 09:28

## 2020-01-16 RX ADMIN — IPRATROPIUM BROMIDE AND ALBUTEROL SULFATE 1 AMPULE: .5; 3 SOLUTION RESPIRATORY (INHALATION) at 20:42

## 2020-01-16 RX ADMIN — Medication 10 ML: at 18:01

## 2020-01-16 RX ADMIN — IPRATROPIUM BROMIDE AND ALBUTEROL SULFATE 1 AMPULE: .5; 3 SOLUTION RESPIRATORY (INHALATION) at 09:08

## 2020-01-16 RX ADMIN — METHYLPREDNISOLONE SODIUM SUCCINATE 40 MG: 40 INJECTION, POWDER, FOR SOLUTION INTRAMUSCULAR; INTRAVENOUS at 18:01

## 2020-01-16 RX ADMIN — IPRATROPIUM BROMIDE AND ALBUTEROL SULFATE 1 AMPULE: .5; 3 SOLUTION RESPIRATORY (INHALATION) at 13:11

## 2020-01-16 RX ADMIN — INSULIN LISPRO 1 UNITS: 100 INJECTION, SOLUTION INTRAVENOUS; SUBCUTANEOUS at 21:27

## 2020-01-16 RX ADMIN — IPRATROPIUM BROMIDE AND ALBUTEROL SULFATE 1 AMPULE: .5; 3 SOLUTION RESPIRATORY (INHALATION) at 05:41

## 2020-01-16 RX ADMIN — INSULIN LISPRO 1 UNITS: 100 INJECTION, SOLUTION INTRAVENOUS; SUBCUTANEOUS at 16:37

## 2020-01-16 RX ADMIN — RIVAROXABAN 20 MG: 10 TABLET, FILM COATED ORAL at 18:01

## 2020-01-16 RX ADMIN — MOMETASONE FUROATE AND FORMOTEROL FUMARATE DIHYDRATE 2 PUFF: 200; 5 AEROSOL RESPIRATORY (INHALATION) at 20:43

## 2020-01-16 RX ADMIN — METHYLPREDNISOLONE SODIUM SUCCINATE 80 MG: 125 INJECTION, POWDER, FOR SOLUTION INTRAMUSCULAR; INTRAVENOUS at 02:28

## 2020-01-16 RX ADMIN — Medication 10 ML: at 21:27

## 2020-01-16 RX ADMIN — INSULIN LISPRO 1 UNITS: 100 INJECTION, SOLUTION INTRAVENOUS; SUBCUTANEOUS at 07:58

## 2020-01-16 RX ADMIN — DOXYCYCLINE 100 MG: 100 INJECTION, POWDER, LYOPHILIZED, FOR SOLUTION INTRAVENOUS at 15:07

## 2020-01-16 RX ADMIN — MOMETASONE FUROATE AND FORMOTEROL FUMARATE DIHYDRATE 2 PUFF: 200; 5 AEROSOL RESPIRATORY (INHALATION) at 09:10

## 2020-01-16 RX ADMIN — DOXYCYCLINE 100 MG: 100 INJECTION, POWDER, LYOPHILIZED, FOR SOLUTION INTRAVENOUS at 02:31

## 2020-01-16 RX ADMIN — LEVOTHYROXINE SODIUM 50 MCG: 25 TABLET ORAL at 05:38

## 2020-01-16 ASSESSMENT — PAIN SCALES - GENERAL
PAINLEVEL_OUTOF10: 0
PAINLEVEL_OUTOF10: 2
PAINLEVEL_OUTOF10: 0

## 2020-01-16 NOTE — H&P
Admission medications    Medication Sig Start Date End Date Taking? Authorizing Provider   albuterol (PROVENTIL) (2.5 MG/3ML) 0.083% nebulizer solution 2.5 mg 5/3/19  Yes Historical Provider, MD   levothyroxine (SYNTHROID) 50 MCG tablet Take 1 tablet by mouth Daily 1/10/20  Yes Yumiko Zabala MD   albuterol sulfate HFA (PROVENTIL HFA) 108 (90 Base) MCG/ACT inhaler Inhale 2 puffs into the lungs every 4 hours as needed for Wheezing or Shortness of Breath 1/10/20  Yes Yumiko Zabala MD   triamcinolone (KENALOG) 0.1 % cream Apply to the affected area 2 times a day as needed for vulvar itching 12/10/19  Yes Yumiko Zabala MD   CARTIA  MG extended release capsule take 1 capsule by mouth daily 10/13/19  Yes Yumiko Zabala MD   XARELTO 20 MG TABS tablet take 1 tablet by mouth once daily WITH BREAKFAST 10/11/19  Yes Tahmina Abad MD   fluticasone-salmeterol (ADVAIR HFA) 115-21 MCG/ACT inhaler Inhale 2 puffs into the lungs 2 times daily 1/10/20   Yumiko Zabala MD   citalopram (CELEXA) 10 MG tablet Take 1 tablet by mouth daily 1/10/20   Yumiko Zabala MD   ciclopirox (PENLAC) 8 % solution Apply topically nightly. 3/26/18   Yumiko Zabala MD       Allergies:  Patient has no known allergies. Social History:   TOBACCO:   reports that she has never smoked. She has never used smokeless tobacco.  ETOH:   reports no history of alcohol use.   OCCUPATION:      Family History:       Problem Relation Age of Onset    Diabetes Father        REVIEW OF SYSTEMS:  Constitutional:  positive for  malaise  negative for  fevers and chills  HEENT:  negative for  ear drainage, earaches, nasal congestion and sore throat  Neck:  No lumps or masses  Cardiac:  negative for  chest pain, palpitations, orthopnea, positive for SOB  Respiratory:  positive for  cough with sputum, dyspnea and wheezing  Gastrointestinal:  negative for nausea, vomiting and abdominal pain  :  negative for frequency, dysuria and hesitancy  Musculoskeletal: negative for  myalgias and arthralgias  Neuro:  negative      Physical Exam:    Vitals: /65   Pulse 71   Temp 98.3 °F (36.8 °C) (Oral)   Resp 16   Ht 5' 6\" (1.676 m)   Wt 187 lb 12.8 oz (85.2 kg)   SpO2 95%   BMI 30.31 kg/m²   General appearance: alert, appears stated age and cooperative  Skin: Skin color, texture, turgor normal. No rashes or lesions  HEENT: Head: Normocephalic, no lesions, without obvious abnormality. Eye: Normal external eye, conjunctiva, lids cornea, HANNAH. Ears: Normal TM's bilaterally. Normal auditory canals and external ears. Non-tender. Nose: Normal external nose, mucus membranes and septum. Pharynx: Upper dentures, oral mucosa moist, no oral lesions, posterior pharynx without erythema. Neck: no adenopathy, no carotid bruit and supple, symmetrical, trachea midline  Lungs: clear to auscultation bilaterally  Heart: regular rate and rhythm, S1, S2 normal and faint systolic murmur. Abdomen: soft, non-tender; bowel sounds normal; no masses,  no organomegaly  Extremities: extremities normal, atraumatic, no cyanosis or edema  Neurologic: Mental status: Alert, oriented, thought content appropriate    CBC:   Recent Labs     01/15/20  1207   WBC 10.1   HGB 14.0        BMP:    Recent Labs     01/15/20  1207 01/16/20  0427    140   K 4.3 4.5   CL 98 104   CO2 27 23   BUN 28* 16   CREATININE 1.01* 0.68   GLUCOSE 130* 192*     Hepatic:   Recent Labs     01/15/20  1207   AST 13   ALT 57*   BILITOT 0.30   ALKPHOS 102     Troponin: No results for input(s): TROPONINI in the last 72 hours. BNP: No results for input(s): BNP in the last 72 hours. Lipids: No results for input(s): CHOL, HDL in the last 72 hours. Invalid input(s): LDLCALCU  ABGs: No results found for: PHART, PO2ART, YVL0APH  INR: No results for input(s): INR in the last 72 hours. -----------------------------------------------------------------  PA/lat CXR: No acute change. Assessment and Plan   1.  Asthma exacerbation - improving on IV Solumedrol and frequent Duonebs. 2. Acute bronchitis - started on IV Doxycycline with improved cough. 3. Mild dehydration - improved with IV fluids. 4. Hypothyroidism - controlled on Levothyroxine. 5. H/O Atrial fibrillation - on Xarelto. 6. H/O Depression / anxiety - controlled on Celexa. Plan:  1. Admit to the hospital.  2.  IV Solumedrol / Doxycycline. 3.  Duonebs every 4 hours W/A.  4.  Incentive spirometry. 5.  Continue home medication. 6.  Full code. Medical Necessity: In patient is appropriate for this patient secondary to out patient failure with the need for IV antibiotics / Steroids with frequent aerosol treatments. Estimated length of stay 2 days. The beneficiary may reasonably be expected to be discharged or transferred to a hospital within 96 hours after admission.       Patient Active Problem List   Diagnosis Code    Hypothyroidism E03.9    Hx of gastroesophageal reflux (GERD) Z87.19    Pneumonia of right lower lobe due to infectious organism ([facility] J18.1    Prediabetes R73.03    Paroxysmal atrial fibrillation (HCC) I48.0    Symptomatic cholelithiasis K80.20    Pneumonia due to infectious organism J18.9    Biliary calculus K80.20    Acute asthma exacerbation J45.901       Moises Veras MD  Admitting Hospitalist

## 2020-01-16 NOTE — DISCHARGE SUMMARY
Hospitalist Discharge Summary    Denisse Webber  :  1955  MRN:  [id number]    Admit date:  1/15/2020  Discharge date:  20    Admitting Physician:  David Charles MD    Discharge Diagnoses:   Asthma exacerbation. Acute Bronchitis. Admission Condition:  poor      Discharged Condition:  good    Hospital Course: The patient is a 75 y.o. female who presents to the ER with cough and worsening SOB. According to Lulú Rick, she was treated for influenza A on . She states that since that time she has been having a cough, sputum production, wheezing, and feeling SOB. She seen Dr. Leandro Sheffield who placed her on Levaquin and Prednisone with no improvement in her symptoms. She denied having a fever or chills. Appetite is okay, no nausea. Some pain over the right side of her chest wall from coughing. She had been using a bronchodilator at home with no improvement. With her worsening / continued symptoms she came to the ER. In the ER her CMP was normal other than a BUN of 28, Creatinine of 1.01, glucose 130, and ALT of 57. CBC showed a WBC of 10.1 with a Hgb of 14. Oxygen saturation was in the mid 90's on room air. CXR was negative. Lulú Rick was admitted on IV Doxycycline and Solumedrol. She was given Duonebs every 4 hours W/A. Incentive spirometry was ordered to reduce atelectasis. IV normal saline was given for mild dehydration. Lulú Rick was monitored on cardiac telemetry. After admission Lulú Rick noticed significant improvement. Her cough resolved and chest tightness resolved. She no longer heard any wheezing. Appetite was good with no nausea. She was able to ambulate with no SOB. It was felt she could be discharged on oral antibiotics and steroids with close out patient follow up with her PCP.      Discharge Exam:    Vitals: /64   Pulse 69   Temp 98.5 °F (36.9 °C) (Oral)   Resp 18   Ht 5' 6\" (1.676 m)   Wt 187 lb 12.8 oz (85.2 kg)   SpO2 94%   BMI 30.31 kg/m²   General appearance: alert and cooperative with exam  HEENT: Head: Normocephalic, no lesions, without obvious abnormality. Eye: Normal external eye, conjunctiva, lids cornea, HANNAH. Nose: Normal external nose, mucus membranes and septum. Neck: no adenopathy, no carotid bruit and supple, symmetrical, trachea midline  Lungs: clear to auscultation bilaterally  Heart: regular rate and rhythm, S1, S2 normal, no murmur, click, rub or gallop  Abdomen: soft, non-tender; bowel sounds normal; no masses,  no organomegaly  Extremities: extremities normal, atraumatic, no cyanosis or edema  Neurologic: Mental status: Alert, oriented, thought content appropriate    Discharge Medications:       [place]   Home Medication Instructions [id number]    Printed on:01/17/20 0546   Medication Information                      albuterol (PROVENTIL) (2.5 MG/3ML) 0.083% nebulizer solution  2.5 mg             albuterol sulfate HFA (PROVENTIL HFA) 108 (90 Base) MCG/ACT inhaler  Inhale 2 puffs into the lungs every 4 hours as needed for Wheezing or Shortness of Breath             benzonatate (TESSALON) 100 MG capsule  Take 1 capsule by mouth every 8 hours as needed for Cough             CARTIA  MG extended release capsule  take 1 capsule by mouth daily             ciclopirox (PENLAC) 8 % solution  Apply topically nightly. citalopram (CELEXA) 10 MG tablet  Take 1 tablet by mouth daily             doxycycline hyclate (VIBRA-TABS) 100 MG tablet  Take 1 tablet by mouth 2 times daily for 7 days             fluticasone-salmeterol (ADVAIR HFA) 115-21 MCG/ACT inhaler  Inhale 2 puffs into the lungs 2 times daily             levothyroxine (SYNTHROID) 50 MCG tablet  Take 1 tablet by mouth Daily             predniSONE (DELTASONE) 20 MG tablet  3 tabs daily for 2 days then 2 tabs daily for 2 days then 1 tab daily for 2 days.              triamcinolone (KENALOG) 0.1 % cream  Apply to the affected area 2 times a day as needed for vulvar itching

## 2020-01-17 VITALS
TEMPERATURE: 98 F | HEIGHT: 66 IN | SYSTOLIC BLOOD PRESSURE: 134 MMHG | WEIGHT: 187.8 LBS | DIASTOLIC BLOOD PRESSURE: 59 MMHG | OXYGEN SATURATION: 95 % | BODY MASS INDEX: 30.18 KG/M2 | HEART RATE: 66 BPM | RESPIRATION RATE: 18 BRPM

## 2020-01-17 LAB
GLUCOSE BLD-MCNC: 117 MG/DL (ref 65–99)
GLUCOSE BLD-MCNC: 123 MG/DL (ref 65–99)
GLUCOSE BLD-MCNC: 157 MG/DL (ref 65–99)

## 2020-01-17 PROCEDURE — 82947 ASSAY GLUCOSE BLOOD QUANT: CPT

## 2020-01-17 PROCEDURE — 94761 N-INVAS EAR/PLS OXIMETRY MLT: CPT

## 2020-01-17 PROCEDURE — 94640 AIRWAY INHALATION TREATMENT: CPT

## 2020-01-17 PROCEDURE — 6370000000 HC RX 637 (ALT 250 FOR IP): Performed by: INTERNAL MEDICINE

## 2020-01-17 PROCEDURE — 2500000003 HC RX 250 WO HCPCS: Performed by: INTERNAL MEDICINE

## 2020-01-17 PROCEDURE — 2580000003 HC RX 258: Performed by: INTERNAL MEDICINE

## 2020-01-17 PROCEDURE — 6360000002 HC RX W HCPCS: Performed by: INTERNAL MEDICINE

## 2020-01-17 RX ORDER — DOXYCYCLINE HYCLATE 100 MG
100 TABLET ORAL 2 TIMES DAILY
Qty: 14 TABLET | Refills: 0 | Status: SHIPPED | OUTPATIENT
Start: 2020-01-17 | End: 2020-01-24

## 2020-01-17 RX ORDER — PREDNISONE 20 MG/1
TABLET ORAL
Qty: 12 TABLET | Refills: 0 | Status: SHIPPED | OUTPATIENT
Start: 2020-01-17 | End: 2020-01-27

## 2020-01-17 RX ORDER — BENZONATATE 100 MG/1
100 CAPSULE ORAL EVERY 8 HOURS PRN
Qty: 20 CAPSULE | Refills: 0 | Status: SHIPPED | OUTPATIENT
Start: 2020-01-17 | End: 2020-04-13 | Stop reason: SDUPTHER

## 2020-01-17 RX ADMIN — METHYLPREDNISOLONE SODIUM SUCCINATE 40 MG: 40 INJECTION, POWDER, FOR SOLUTION INTRAMUSCULAR; INTRAVENOUS at 10:11

## 2020-01-17 RX ADMIN — CITALOPRAM HYDROBROMIDE 10 MG: 20 TABLET ORAL at 10:07

## 2020-01-17 RX ADMIN — LEVOTHYROXINE SODIUM 50 MCG: 25 TABLET ORAL at 05:10

## 2020-01-17 RX ADMIN — Medication 10 ML: at 03:27

## 2020-01-17 RX ADMIN — DILTIAZEM HYDROCHLORIDE 120 MG: 120 CAPSULE, COATED, EXTENDED RELEASE ORAL at 10:08

## 2020-01-17 RX ADMIN — Medication 10 ML: at 10:12

## 2020-01-17 RX ADMIN — DOXYCYCLINE 100 MG: 100 INJECTION, POWDER, LYOPHILIZED, FOR SOLUTION INTRAVENOUS at 03:27

## 2020-01-17 RX ADMIN — IPRATROPIUM BROMIDE AND ALBUTEROL SULFATE 1 AMPULE: .5; 3 SOLUTION RESPIRATORY (INHALATION) at 09:23

## 2020-01-17 RX ADMIN — METHYLPREDNISOLONE SODIUM SUCCINATE 40 MG: 40 INJECTION, POWDER, FOR SOLUTION INTRAMUSCULAR; INTRAVENOUS at 03:26

## 2020-01-17 RX ADMIN — MOMETASONE FUROATE AND FORMOTEROL FUMARATE DIHYDRATE 2 PUFF: 200; 5 AEROSOL RESPIRATORY (INHALATION) at 09:23

## 2020-01-17 ASSESSMENT — PAIN SCALES - GENERAL
PAINLEVEL_OUTOF10: 0

## 2020-01-17 NOTE — PROGRESS NOTES
abnormality. Eye: Normal external eye, conjunctiva, lids cornea, HANNAH. Nose: Normal external nose, mucus membranes and septum. Neck: no adenopathy, no carotid bruit and supple, symmetrical, trachea midline  Lungs: clear to auscultation bilaterally  Heart: regular rate and rhythm, S1, S2 normal, no murmur, click, rub or gallop  Abdomen: soft, non-tender; bowel sounds normal; no masses,  no organomegaly  Extremities: extremities normal, atraumatic, no cyanosis or edema  Neurologic: Mental status: Alert, oriented, thought content appropriate    Assessment and Plan:   1. Asthma exacerbation - improved on IV Solumedrol and frequent Duonebs. 2. Acute bronchitis - started on IV Doxycycline with improved cough. 3. Mild dehydration - improved with IV fluids. 4. Hypothyroidism - controlled on Levothyroxine. 5. H/O Atrial fibrillation - on Xarelto. 6. H/O Depression / anxiety - controlled on Celexa. Plan:  1. Discharge home today to continue on oral antibiotic / steroid.         Patient Active Problem List:     Hypothyroidism     Hx of gastroesophageal reflux (GERD)     Pneumonia of right lower lobe due to infectious organism ([address]     Prediabetes     Paroxysmal atrial fibrillation ([address]     Symptomatic cholelithiasis     Pneumonia due to infectious organism     Biliary calculus     Acute asthma exacerbation      Moises Veras MD  [facility] Hospitalist

## 2020-01-18 ENCOUNTER — CARE COORDINATION (OUTPATIENT)
Dept: CASE MANAGEMENT | Age: 65
End: 2020-01-18

## 2020-01-20 ENCOUNTER — CARE COORDINATION (OUTPATIENT)
Dept: CASE MANAGEMENT | Age: 65
End: 2020-01-20

## 2020-01-20 NOTE — CARE COORDINATION
[facility] Transitions Initial Follow Up Call- 2nd attempt     Call within 2 business days of discharge: Yes    Patient: Denisse Webber Patient : 1955   MRN: <[id number]>  Reason for Admission: asthma   Discharge Date: 20 RARS: Readmission Risk Score: 12      Last Discharge [facility]       Complaint Diagnosis Description Type Department Provider    1/15/20 Shortness of Breath Dehydration . .. ED to Hosp-Admission (Discharged) (ADMITTED) Yeis Akbar MD; Mikhail Blair MD           Spoke with: [facility]    Attempted to reach patient for initial transitional call.    left to return call to [phone]   Call to pharmacy who confirms meds picked up     Facility: [facility]     Non-face-to-face services provided:  Assessment and support for treatment adherence and medication management-confirmed meds picked up       Follow Up  Future Appointments   Date Time Provider Alexey Mosqueda   2020  4:00 PM Christopher Frias MD [address]   3/9/2020 10:00 AM Michael Marie   4/3/2020 10:00 AM Michael Marie   2020 10:30 AM MD Chuckie Toussaint DEANNA Rodriguezo, RN

## 2020-01-21 ENCOUNTER — CARE COORDINATION (OUTPATIENT)
Dept: CASE MANAGEMENT | Age: 65
End: 2020-01-21

## 2020-01-21 NOTE — CARE COORDINATION
[facility] Transitions Initial Follow Up Call- 3rd attempt     Call within 2 business days of discharge: Yes    Patient: Medardo Barksdale Patient : 1955   MRN: <[id number]>  Reason for Admission: asthma, dehydration, bronchitis   Discharge Date: 20 RARS: Readmission Risk Score: 12      Last Discharge [facility]       Complaint Diagnosis Description Type Department Provider    1/15/20 Shortness of Breath Dehydration . .. ED to Hosp-Admission (Discharged) (ADMITTED) Palomo Barron MD; Mayra Vazquez MD           Attempted to reach patient for initial transitional call.   VM left to return call to [phone]  3rd attempt- episode closed     Facility: [facility]         Follow Up  Future Appointments   Date Time Provider Alexey Jaylin   2020  4:00 PM Erin Boogie MD [facility]   3/9/2020 10:00 AM Erin Boogie MD [facility]   4/3/2020 10:00 AM Erin Boogie MD [facility]   2020 10:30 AM MD Cassandra Truong [facility]       Spencer Kim RN

## 2020-01-23 ENCOUNTER — OFFICE VISIT (OUTPATIENT)
Dept: FAMILY MEDICINE CLINIC | Age: 65
End: 2020-01-23
Payer: MEDICARE

## 2020-01-23 VITALS
WEIGHT: 187 LBS | SYSTOLIC BLOOD PRESSURE: 102 MMHG | HEART RATE: 64 BPM | OXYGEN SATURATION: 97 % | DIASTOLIC BLOOD PRESSURE: 68 MMHG | BODY MASS INDEX: 30.18 KG/M2

## 2020-01-23 PROCEDURE — 99495 TRANSJ CARE MGMT MOD F2F 14D: CPT | Performed by: INTERNAL MEDICINE

## 2020-01-23 PROCEDURE — 1111F DSCHRG MED/CURRENT MED MERGE: CPT | Performed by: INTERNAL MEDICINE

## 2020-01-23 NOTE — PROGRESS NOTES
from /01/15-01/17, states that she is doing much better. does states that she is still getting really bad cold sweats from her neck up when she is up and moving, or eating. History of Present illness - Follow up of Hospital diagnosis(es):     Cinda Lombardo presents to office to follow up for recent hospitalization at [facility] for asthma exacerbation and Acute bronchitis. She is a pleasant Estonian speaking woman accompanied by her daughter Wiley Duncan who is very fluent in [place] and helps with interpretation. Cinda Lombardo states she feels a lot better since her discharge. She completed oral Anbx and steroid course. Today she c/o episodes of lightheadedness and sometimes associated generalized weakness. Has no CP, SOB, no palpitations. Her daughter states that when she checks Courtney's BP it is running low. SBP sometimes in 80s. Cinda Lombardo had no syncopal episodes. She has a h/o Afib with RVR. Anticoagulated with Xarelto . On Cardizem 120 mg/day. She is not on any other BP meds. Inpatient course: Discharge summary reviewed- see chart. Interval history/Current status:       Vitals:    01/23/20 1602   BP: 102/68   Site: Right Upper Arm   Position: Sitting   Cuff Size: Medium Adult   Pulse: 64   SpO2: 97%   Weight: 187 lb (84.8 kg)     Body mass index is 30.18 kg/m².    Wt Readings from Last 3 Encounters:   01/23/20 187 lb (84.8 kg)   01/15/20 187 lb 12.8 oz (85.2 kg)   01/10/20 190 lb (86.2 kg)     BP Readings from Last 3 Encounters:   01/23/20 102/68   01/17/20 (!) 134/59   01/10/20 110/60        Physical Exam:  General Appearance: alert and oriented to person, place and time, well developed and well- nourished, in no acute distress  Skin: warm and dry, no rash or erythema  Head: normocephalic and atraumatic  Eyes:  conjunctivae normal, no discharge from eyes  ENT: tympanic membrane, external ear and ear canal normal bilaterally, nose without deformity, nasal mucosa and turbinates normal without polyp  Neck: supple and non-tender without mass, no thyromegaly or thyroid nodules, no cervical lymphadenopathy  Pulmonary/Chest: clear to auscultation bilaterally- no wheezes, rales or rhonchi, normal air movement  Cardiovascular: normal rate, regular rhythm, normal S1 and S2, no murmurs  Abdomen: soft, non-tender, non-distended, normal bowel sounds  Extremities: no cyanosis, clubbing or edema  Musculoskeletal: normal range of motion, no joint swelling, deformity or tenderness  Neurologic: reflexes normal and symmetric, no cranial nerve deficit, gait, coordination and speech normal    Assessment/Plan:    1. Acute bronchitis, unspecified organism  Resolved, completed Antibiotics an Prednisone course    2. Hypotension due to drugs  Will decrease Cardizem dose from 120 mg/day to 30 mg big. If still symptomatic will need to reevaluate. She is to monitor BP at home and call if still having low readings. 3. Paroxysmal atrial fibrillation (HCC)    Stabel HR, continue on Xarelto  - diltiazem (CARDIZEM) 30 MG tablet; Take 1 tablet by mouth 2 times daily  Dispense: 60 tablet;  Refill: 3        Medical Decision Making: moderate complexity

## 2020-02-26 ENCOUNTER — HOSPITAL ENCOUNTER (OUTPATIENT)
Dept: MAMMOGRAPHY | Age: 65
Discharge: HOME | End: 2020-02-28
Payer: MEDICARE

## 2020-02-26 PROCEDURE — 77067 SCR MAMMO BI INCL CAD: CPT

## 2020-04-02 RX ORDER — RIVAROXABAN 20 MG/1
TABLET, FILM COATED ORAL
Qty: 90 TABLET | Refills: 1 | Status: SHIPPED | OUTPATIENT
Start: 2020-04-02 | End: 2020-09-29 | Stop reason: SDUPTHER

## 2020-04-08 ENCOUNTER — OFFICE VISIT (OUTPATIENT)
Dept: PRIMARY CARE CLINIC | Age: 65
End: 2020-04-08
Payer: MEDICARE

## 2020-04-08 ENCOUNTER — HOSPITAL ENCOUNTER (OUTPATIENT)
Age: 65
Setting detail: SPECIMEN
Discharge: HOME | End: 2020-04-08
Payer: MEDICARE

## 2020-04-08 VITALS
HEART RATE: 98 BPM | TEMPERATURE: 98.5 F | BODY MASS INDEX: 30.12 KG/M2 | OXYGEN SATURATION: 95 % | SYSTOLIC BLOOD PRESSURE: 124 MMHG | WEIGHT: 186.6 LBS | DIASTOLIC BLOOD PRESSURE: 80 MMHG

## 2020-04-08 PROCEDURE — 1123F ACP DISCUSS/DSCN MKR DOCD: CPT | Performed by: NURSE PRACTITIONER

## 2020-04-08 PROCEDURE — G8427 DOCREV CUR MEDS BY ELIG CLIN: HCPCS | Performed by: NURSE PRACTITIONER

## 2020-04-08 PROCEDURE — U0002 COVID-19 LAB TEST NON-CDC: HCPCS

## 2020-04-08 PROCEDURE — 4040F PNEUMOC VAC/ADMIN/RCVD: CPT | Performed by: NURSE PRACTITIONER

## 2020-04-08 PROCEDURE — G8417 CALC BMI ABV UP PARAM F/U: HCPCS | Performed by: NURSE PRACTITIONER

## 2020-04-08 PROCEDURE — 1090F PRES/ABSN URINE INCON ASSESS: CPT | Performed by: NURSE PRACTITIONER

## 2020-04-08 PROCEDURE — 99213 OFFICE O/P EST LOW 20 MIN: CPT | Performed by: NURSE PRACTITIONER

## 2020-04-08 PROCEDURE — G8399 PT W/DXA RESULTS DOCUMENT: HCPCS | Performed by: NURSE PRACTITIONER

## 2020-04-08 PROCEDURE — 1036F TOBACCO NON-USER: CPT | Performed by: NURSE PRACTITIONER

## 2020-04-08 PROCEDURE — 3017F COLORECTAL CA SCREEN DOC REV: CPT | Performed by: NURSE PRACTITIONER

## 2020-04-08 RX ORDER — DILTIAZEM HYDROCHLORIDE 120 MG/1
CAPSULE, EXTENDED RELEASE ORAL
COMMUNITY
Start: 2020-02-02 | End: 2020-06-15 | Stop reason: ALTCHOICE

## 2020-04-08 RX ORDER — OMEPRAZOLE 20 MG/1
CAPSULE, DELAYED RELEASE ORAL
COMMUNITY
Start: 2020-03-09 | End: 2021-09-21 | Stop reason: SDUPTHER

## 2020-04-08 NOTE — PATIENT INSTRUCTIONS
your health, and be sure to contact your doctor if:    · Your symptoms get worse.     · You are not getting better as expected.     Call before you go to the doctor's office. Follow their instructions. And wear a face mask if you have one. Current as of: April 1, 2020               Content Version: 12.4  © 1841-7719 [employer]. Care instructions adapted under license by your healthcare professional. If you have questions about a medical condition or this instruction, always ask your healthcare professional. [place] [address] any warranty or liability for your use of this information.

## 2020-04-10 ENCOUNTER — TELEPHONE (OUTPATIENT)
Dept: FAMILY MEDICINE CLINIC | Age: 65
End: 2020-04-10

## 2020-04-10 LAB
SARS-COV-2, NAA: NOT DETECTED
SARS-COV-2, PCR: NORMAL
SARS-COV-2: NORMAL
SOURCE: NORMAL

## 2020-04-11 ENCOUNTER — TELEPHONE (OUTPATIENT)
Dept: PRIMARY CARE CLINIC | Age: 65
End: 2020-04-11

## 2020-04-13 ENCOUNTER — TELEPHONE (OUTPATIENT)
Dept: FAMILY MEDICINE CLINIC | Age: 65
End: 2020-04-13

## 2020-04-13 RX ORDER — BENZONATATE 100 MG/1
100 CAPSULE ORAL EVERY 8 HOURS PRN
Qty: 30 CAPSULE | Refills: 1 | Status: SHIPPED | OUTPATIENT
Start: 2020-04-13 | End: 2020-04-20

## 2020-04-13 RX ORDER — ALBUTEROL SULFATE 2.5 MG/3ML
2.5 SOLUTION RESPIRATORY (INHALATION) EVERY 6 HOURS PRN
Qty: 120 EACH | Refills: 1 | Status: SHIPPED | OUTPATIENT
Start: 2020-04-13 | End: 2022-07-21 | Stop reason: SDUPTHER

## 2020-04-15 NOTE — TELEPHONE ENCOUNTER
Medication pending    Health Maintenance   Topic Date Due    DTaP/Tdap/Td vaccine (1 - Tdap) 05/27/1972    Shingles Vaccine (1 of 2) 05/27/2003    Annual Wellness Visit (AWV)  05/29/2019    Pneumococcal 65+ years Vaccine (2 of 2 - PPSV23) 11/20/2019    A1C test (Diabetic or Prediabetic)  07/08/2020    Flu vaccine (Season Ended) 09/01/2020    TSH testing  09/19/2020    Breast cancer screen  02/26/2022    Lipid screen  09/19/2024    Colon cancer screen colonoscopy  07/20/2025    DEXA (modify frequency per FRAX score)  Completed    Hepatitis C screen  Addressed    Hepatitis A vaccine  Aged Out    Hepatitis B vaccine  Aged Out    Hib vaccine  Aged Out    Meningococcal (ACWY) vaccine  Aged Out             (applicable per patient's age: Cancer Screenings, Depression Screening, Fall Risk Screening, Immunizations)    Hemoglobin A1C (%)   Date Value   07/08/2019 5.6   03/26/2018 5.9   09/25/2017 6.0     LDL Cholesterol (mg/dL)   Date Value   09/19/2019 87     AST (U/L)   Date Value   01/15/2020 13     ALT (U/L)   Date Value   01/15/2020 57 (H)     BUN (mg/dL)   Date Value   01/16/2020 16      (goal A1C is < 7)   (goal LDL is <100) need 30-50% reduction from baseline     BP Readings from Last 3 Encounters:   04/08/20 124/80   01/23/20 102/68   01/17/20 (!) 134/59    (goal /80)      All Future Testing planned in [facility]:  Lab Frequency Next Occurrence   CBC Auto Differential Once 09/24/2020   Comprehensive Metabolic Panel Once 06/45/4698   Vitamin D 25 Hydroxy Once 09/24/2020   Lipid Panel Once 09/24/2020   TSH with Reflex Once 09/24/2020   Magnesium Once 09/24/2020   EKG 12 Lead Once 09/24/2020   XR CHEST STANDARD (2 VW) Once 04/08/2020       Next Visit Date:  Future Appointments   Date Time Provider Alexey Mosqueda   9/24/2020 10:30 AM MD Fadumo Patiño [facility]            Patient Active Problem List:     Hypothyroidism     Hx of gastroesophageal reflux (GERD)     Pneumonia of right lower lobe due to infectious organism [facility])     Prediabetes     Paroxysmal atrial fibrillation (HCC)     Symptomatic cholelithiasis     Pneumonia due to infectious organism     Biliary calculus     Acute asthma exacerbation

## 2020-06-15 ENCOUNTER — HOSPITAL ENCOUNTER (OUTPATIENT)
Age: 65
Discharge: HOME | End: 2020-06-15
Payer: MEDICARE

## 2020-06-15 ENCOUNTER — OFFICE VISIT (OUTPATIENT)
Dept: FAMILY MEDICINE CLINIC | Age: 65
End: 2020-06-15
Payer: MEDICARE

## 2020-06-15 VITALS
HEIGHT: 66 IN | OXYGEN SATURATION: 94 % | BODY MASS INDEX: 31.5 KG/M2 | RESPIRATION RATE: 18 BRPM | HEART RATE: 81 BPM | WEIGHT: 196 LBS | SYSTOLIC BLOOD PRESSURE: 116 MMHG | DIASTOLIC BLOOD PRESSURE: 70 MMHG

## 2020-06-15 LAB
-: ABNORMAL
ABSOLUTE EOS #: ABNORMAL K/UL (ref 0–0.4)
ABSOLUTE IMMATURE GRANULOCYTE: ABNORMAL K/UL (ref 0–0.3)
ABSOLUTE LYMPH #: 2.29 K/UL (ref 1–4.8)
ABSOLUTE MONO #: 0.81 K/UL (ref 0–1)
ALBUMIN SERPL-MCNC: 4.3 G/DL (ref 3.5–5.2)
ALBUMIN/GLOBULIN RATIO: ABNORMAL (ref 1–2.5)
ALP BLD-CCNC: 97 U/L (ref 35–104)
ALT SERPL-CCNC: 16 U/L (ref 5–33)
AMORPHOUS: ABNORMAL
ANION GAP SERPL CALCULATED.3IONS-SCNC: 6 MMOL/L (ref 9–17)
AST SERPL-CCNC: 13 U/L
BACTERIA: ABNORMAL
BASOPHILS # BLD: 1 % (ref 0–2)
BASOPHILS ABSOLUTE: 0.06 K/UL (ref 0–0.2)
BILIRUB SERPL-MCNC: 0.25 MG/DL (ref 0.3–1.2)
BILIRUBIN DIRECT: <0.08 MG/DL
BILIRUBIN URINE: NEGATIVE
BILIRUBIN, INDIRECT: ABNORMAL MG/DL (ref 0–1)
BUN BLDV-MCNC: 16 MG/DL (ref 8–23)
BUN/CREAT BLD: 21 (ref 9–20)
CALCIUM SERPL-MCNC: 10.2 MG/DL (ref 8.6–10.4)
CASTS UA: ABNORMAL /LPF
CHLORIDE BLD-SCNC: 98 MMOL/L (ref 98–107)
CO2: 31 MMOL/L (ref 20–31)
COLOR: YELLOW
COMMENT UA: ABNORMAL
CREAT SERPL-MCNC: 0.77 MG/DL (ref 0.5–0.9)
CRYSTALS, UA: ABNORMAL /HPF
DIFFERENTIAL TYPE: ABNORMAL
EOSINOPHILS RELATIVE PERCENT: ABNORMAL % (ref 0–5)
EPITHELIAL CELLS UA: ABNORMAL /HPF
GFR AFRICAN AMERICAN: >60 ML/MIN
GFR NON-AFRICAN AMERICAN: >60 ML/MIN
GFR SERPL CREATININE-BSD FRML MDRD: ABNORMAL ML/MIN/{1.73_M2}
GFR SERPL CREATININE-BSD FRML MDRD: ABNORMAL ML/MIN/{1.73_M2}
GLOBULIN: ABNORMAL G/DL (ref 1.5–3.8)
GLUCOSE BLD-MCNC: 118 MG/DL (ref 70–99)
GLUCOSE URINE: NEGATIVE
HCT VFR BLD CALC: 42.2 % (ref 36–46)
HEMOGLOBIN: 13.8 G/DL (ref 12–16)
IMMATURE GRANULOCYTES: ABNORMAL %
KETONES, URINE: NEGATIVE
LEUKOCYTE ESTERASE, URINE: ABNORMAL
LYMPHOCYTES # BLD: 37 % (ref 15–40)
MCH RBC QN AUTO: 27.1 PG (ref 26–34)
MCHC RBC AUTO-ENTMCNC: 32.6 G/DL (ref 31–37)
MCV RBC AUTO: 83.2 FL (ref 80–100)
MONOCYTES # BLD: 13 % (ref 4–8)
MORPHOLOGY: ABNORMAL
MUCUS: ABNORMAL
NITRITE, URINE: NEGATIVE
NRBC AUTOMATED: ABNORMAL PER 100 WBC
OTHER OBSERVATIONS UA: ABNORMAL
PDW BLD-RTO: 16.3 % (ref 12.1–15.2)
PH UA: 6 (ref 5–8)
PLATELET # BLD: 272 K/UL (ref 140–450)
PLATELET ESTIMATE: ABNORMAL
PMV BLD AUTO: ABNORMAL FL (ref 6–12)
POTASSIUM SERPL-SCNC: 4.5 MMOL/L (ref 3.7–5.3)
PROTEIN UA: NEGATIVE
RBC # BLD: 5.07 M/UL (ref 4–5.2)
RBC # BLD: ABNORMAL 10*6/UL
RBC UA: ABNORMAL /HPF (ref 0–2)
RENAL EPITHELIAL, UA: ABNORMAL /HPF
SEG NEUTROPHILS: 49 % (ref 47–75)
SEGMENTED NEUTROPHILS ABSOLUTE COUNT: 3.04 K/UL (ref 2.5–7)
SODIUM BLD-SCNC: 135 MMOL/L (ref 135–144)
SPECIFIC GRAVITY UA: 1.01 (ref 1–1.03)
TOTAL PROTEIN: 8.7 G/DL (ref 6.4–8.3)
TRICHOMONAS: ABNORMAL
TSH SERPL DL<=0.05 MIU/L-ACNC: 2.57 MIU/L (ref 0.3–5)
TURBIDITY: CLEAR
URINE HGB: ABNORMAL
UROBILINOGEN, URINE: NORMAL
WBC # BLD: 6.2 K/UL (ref 3.5–11)
WBC # BLD: ABNORMAL 10*3/UL
WBC UA: ABNORMAL /HPF
YEAST: ABNORMAL

## 2020-06-15 PROCEDURE — 1036F TOBACCO NON-USER: CPT | Performed by: INTERNAL MEDICINE

## 2020-06-15 PROCEDURE — 36415 COLL VENOUS BLD VENIPUNCTURE: CPT

## 2020-06-15 PROCEDURE — 80048 BASIC METABOLIC PNL TOTAL CA: CPT

## 2020-06-15 PROCEDURE — 81001 URINALYSIS AUTO W/SCOPE: CPT

## 2020-06-15 PROCEDURE — 4040F PNEUMOC VAC/ADMIN/RCVD: CPT | Performed by: INTERNAL MEDICINE

## 2020-06-15 PROCEDURE — 3017F COLORECTAL CA SCREEN DOC REV: CPT | Performed by: INTERNAL MEDICINE

## 2020-06-15 PROCEDURE — 84443 ASSAY THYROID STIM HORMONE: CPT

## 2020-06-15 PROCEDURE — 1123F ACP DISCUSS/DSCN MKR DOCD: CPT | Performed by: INTERNAL MEDICINE

## 2020-06-15 PROCEDURE — G8417 CALC BMI ABV UP PARAM F/U: HCPCS | Performed by: INTERNAL MEDICINE

## 2020-06-15 PROCEDURE — G8427 DOCREV CUR MEDS BY ELIG CLIN: HCPCS | Performed by: INTERNAL MEDICINE

## 2020-06-15 PROCEDURE — 80076 HEPATIC FUNCTION PANEL: CPT

## 2020-06-15 PROCEDURE — 85025 COMPLETE CBC W/AUTO DIFF WBC: CPT

## 2020-06-15 PROCEDURE — 99214 OFFICE O/P EST MOD 30 MIN: CPT | Performed by: INTERNAL MEDICINE

## 2020-06-15 PROCEDURE — G8399 PT W/DXA RESULTS DOCUMENT: HCPCS | Performed by: INTERNAL MEDICINE

## 2020-06-15 PROCEDURE — 1090F PRES/ABSN URINE INCON ASSESS: CPT | Performed by: INTERNAL MEDICINE

## 2020-06-15 RX ORDER — MINERAL OIL, PETROLATUM, PHENYLEPHRINE HCL 14; 74.9; .25 G/100G; G/100G; G/100G
OINTMENT RECTAL 2 TIMES DAILY PRN
Qty: 28 G | Refills: 0 | Status: SHIPPED | OUTPATIENT
Start: 2020-06-15

## 2020-06-15 RX ORDER — CLOBETASOL PROPIONATE 0.5 MG/G
CREAM TOPICAL
Qty: 60 G | Refills: 3 | Status: SHIPPED | OUTPATIENT
Start: 2020-06-15 | End: 2021-09-21 | Stop reason: SDUPTHER

## 2020-06-15 ASSESSMENT — ENCOUNTER SYMPTOMS
VOMITING: 0
RECTAL PAIN: 1
BLOOD IN STOOL: 0
TROUBLE SWALLOWING: 0
CONSTIPATION: 0
VOICE CHANGE: 0
DIARRHEA: 0
ABDOMINAL DISTENTION: 0
ABDOMINAL PAIN: 0
SORE THROAT: 0
COUGH: 0
CHANGE IN BOWEL HABIT: 0
NAUSEA: 0
SHORTNESS OF BREATH: 0

## 2020-06-15 NOTE — PROGRESS NOTES
HPI Notes    Name: Silvano Luciaon  : 1955         Chief Complaint:     Chief Complaint   Patient presents with    Hemorrhoids     c/o hemmorrhoid flare up alot. feeling very weak       History of Present Illness:        [facility] presents to office for evauation of hemorrhoids and fatigue and Hypothyroidism  She is a pleasant German speaking woman accompanied by her daughter Sole Crisostomo who helps with the interpretation. [facility] says she  has a h/o hemorrhoids for many years. Recently the problem has been worsening. States hemorrhoids are protruding and painful. States pain goes away after she has bowel movements. Tried hydrocortisone ecream OTC and it did not help. She has no bleeding. Denies constipation,      Also has a lot of vaginal itching. Feels tired all the time. Has little energy. She was prescribed Celexa in the past for possible depression but has not been taking it. [facility] confirms that she is compliant with  thyroid medication. She reports fatigue, no constipation,no  increased skin dryness, or increased lower extremity edema. The last TSH was 3.11 on 19. Takes Synthroid on empty stomach every morning    Fatigue   This is a new problem. The current episode started more than 1 month ago. The problem occurs constantly. The problem has been unchanged. Associated symptoms include fatigue. Pertinent negatives include no abdominal pain, change in bowel habit, chest pain, chills, congestion, coughing, fever, headaches, myalgias, nausea, rash, sore throat or vomiting. Nothing aggravates the symptoms. Treatments tried: multivitamins, Vit C. The treatment provided no relief.            Past Medical History:     Past Medical History:   Diagnosis Date    Arthritis     Asthma     Chickenpox     Hypertension     Hypothyroidism     Measles     Mumps     Osteoarthritis     Smallpox     Ulcer     Ulcer     Whooping cough       Reviewed all health maintenance requirements and orderedappropriate tests  Health Maintenance Due   Topic Date Due    Annual Wellness Visit (AWV)  05/29/2019       Past Surgical History:     Past Surgical History:   Procedure Laterality Date    CHOLECYSTECTOMY, LAPAROSCOPIC N/A 3/4/2019    CHOLECYSTECTOMY LAPAROSCOPIC performed by Randolph Guerrero MD at [facility] COLONOSCOPY  07/20/2015    Amie Ventura MD    HYSTERECTOMY  2006    Vaginal hysterectomy dr Dwayne Hoover  07/20/2015    Amie Ventura MD        Medications:       Prior to Admission medications    Medication Sig Start Date End Date Taking? Authorizing Provider   phenylephrine-mineral oil-petrolatum (HEMORRHOIDAL) 0.25-14-74.9 % rectal ointment Place rectally 2 times daily as needed for Hemorrhoids 6/15/20  Yes Nithin Shea MD   clobetasol (TEMOVATE) 0.05 % cream Using a thin layer daily as needed for itching. Not to exceed 6 weeks of continuous use 6/15/20  Yes Nithin Shea MD   dilTIAZem (CARDIZEM) 30 MG tablet Take 1 tablet by mouth 2 times daily 4/15/20  Yes Nithin Shea MD   albuterol (PROVENTIL) (2.5 MG/3ML) 0.083% nebulizer solution Take 3 mLs by nebulization every 6 hours as needed for Wheezing 4/13/20  Yes Nithin Shea MD   omeprazole (PRILOSEC) 20 MG delayed release capsule take 1 capsule by mouth once daily 3/9/20  Yes Historical Provider, MD   XARELTO 20 MG TABS tablet take 1 tablet by mouth every morning WITH BREAKFAST 4/2/20  Yes Nithin Shea MD   levothyroxine (SYNTHROID) 50 MCG tablet Take 1 tablet by mouth Daily 1/10/20  Yes Nithin Shea MD   albuterol sulfate HFA (PROVENTIL HFA) 108 (90 Base) MCG/ACT inhaler Inhale 2 puffs into the lungs every 4 hours as needed for Wheezing or Shortness of Breath 1/10/20  Yes Nithin Shea MD   ciclopirox (PENLAC) 8 % solution Apply to the affected nails nightly.   Patient not taking: Reported on 4/8/2020 1/23/20   Nithin Shea MD   fluticasone-salmeterol (ADVAIR HFA) 141-60 MCG/ACT inhaler Inhale 2 puffs into the lungs 2 times daily  Patient not taking: Reported on 4/8/2020 1/10/20   Rosalino Pereyra MD   citalopram (CELEXA) 10 MG tablet Take 1 tablet by mouth daily  Patient not taking: Reported on 4/8/2020 1/10/20   Rosalino Pereyra MD   triamcinolone (KENALOG) 0.1 % cream Apply to the affected area 2 times a day as needed for vulvar itching  Patient not taking: Reported on 4/8/2020 12/10/19   Rosalino Pereyra MD        Allergies:       Patient has no known allergies. Social History:     Tobacco: reports that she has never smoked. She has never used smokeless tobacco.  Alcohol:      reports no history of alcohol use. Drug Use:  reports no history of drug use. Family History:     Family History   Problem Relation Age of Onset    Diabetes Father        Review of Systems:         Review of Systems   Constitutional: Positive for fatigue. Negative for activity change, appetite change, chills, fever and unexpected weight change. HENT: Negative for congestion, sore throat, trouble swallowing and voice change. Respiratory: Negative for cough and shortness of breath. Cardiovascular: Negative for chest pain and palpitations. Gastrointestinal: Positive for rectal pain (hemorrhoids). Negative for abdominal distention, abdominal pain, blood in stool, change in bowel habit, constipation, diarrhea, nausea and vomiting. Genitourinary: Negative for dyspareunia, dysuria, menstrual problem, pelvic pain, vaginal bleeding and vaginal pain. Vaginal itching   Musculoskeletal: Negative for myalgias. Skin: Negative for rash. Neurological: Negative for dizziness and headaches. Psychiatric/Behavioral: Positive for dysphoric mood. Negative for sleep disturbance. The patient is not nervous/anxious. Physical Exam:     Vitals:  /70   Pulse 81   Resp 18   Ht 5' 6\" (1.676 m)   Wt 196 lb (88.9 kg)   SpO2 94%   BMI 31.64 kg/m²       Physical Exam  Vitals signs reviewed.    Constitutional: General: She is not in acute distress. Appearance: She is well-developed. HENT:      Head: Normocephalic and atraumatic. Neck:      Thyroid: No thyromegaly. Cardiovascular:      Rate and Rhythm: Normal rate and regular rhythm. Heart sounds: Normal heart sounds. No murmur. Pulmonary:      Effort: Pulmonary effort is normal.      Breath sounds: Normal breath sounds. No wheezing or rales. Abdominal:      General: Bowel sounds are normal. There is no distension. Palpations: Abdomen is soft. There is no mass. Tenderness: There is no abdominal tenderness. Musculoskeletal: Normal range of motion. Lymphadenopathy:      Cervical: No cervical adenopathy. Skin:     General: Skin is warm and dry. Findings: No rash. Neurological:      Mental Status: She is alert and oriented to person, place, and time. Psychiatric:         Behavior: Behavior normal.         Judgment: Judgment normal.               Data:     Lab Results   Component Value Date     06/15/2020    K 4.5 06/15/2020    CL 98 06/15/2020    CO2 31 06/15/2020    BUN 16 06/15/2020    CREATININE 0.77 06/15/2020    GLUCOSE 118 06/15/2020    PROT 8.7 06/15/2020    LABALBU 4.3 06/15/2020    BILITOT 0.25 06/15/2020    ALKPHOS 97 06/15/2020    AST 13 06/15/2020    ALT 16 06/15/2020     Lab Results   Component Value Date    WBC 6.2 06/15/2020    RBC 5.07 06/15/2020    HGB 13.8 06/15/2020    HCT 42.2 06/15/2020    MCV 83.2 06/15/2020    MCH 27.1 06/15/2020    MCHC 32.6 06/15/2020    RDW 16.3 06/15/2020     06/15/2020    MPV NOT REPORTED 06/15/2020     Lab Results   Component Value Date    TSH 2.57 06/15/2020     Lab Results   Component Value Date    CHOL 167 09/19/2019    HDL 60 09/19/2019    LABA1C 5.6 07/08/2019          Assessment & Plan        Diagnosis Orders   1. Fatigue, unspecified type   Patient had fatigue since her Influenza A and  pneumonia lat January.    She was tested for Covid -19 on 4/8/20 and it was Standing Status:   Future     Number of Occurrences:   1     Standing Expiration Date:   6/15/2021    Basic Metabolic Panel     Standing Status:   Future     Number of Occurrences:   1     Standing Expiration Date:   6/15/2021    Hepatic Function Panel     Standing Status:   Future     Number of Occurrences:   1     Standing Expiration Date:   6/15/2021    Urinalysis Reflex to Culture     Standing Status:   Future     Number of Occurrences:   1     Standing Expiration Date:   6/15/2021     Order Specific Question:   SPECIFY(EX-CATH,MIDSTREAM,CYSTO,ETC)? Answer:   Hang Bonner MD, OB/GYN, [facility]     Referral Priority:   Routine     Referral Type:   Eval and Treat     Referral Reason:   Specialty Services Required     Referred to Provider:   Ale Henson MD     Requested Specialty:   Obstetrics & Gynecology     Number of Visits Requested:   1         Patient Instructions     SURVEY:    You may be receiving a survey from [employer] regarding your visit today. Please complete the survey to enable us to provide the highest quality of care to you and your family. If you cannot score us a very good on any question, please call the office to discuss how we could have made your experience a very good one. Thank you. Electronically signed by Brenda Macias MD on 6/15/2020 at 7:52 PM           Completed Refills      Requested Prescriptions     Signed Prescriptions Disp Refills    phenylephrine-mineral oil-petrolatum (HEMORRHOIDAL) 0.25-14-74.9 % rectal ointment 28 g 0     Sig: Place rectally 2 times daily as needed for Hemorrhoids    clobetasol (TEMOVATE) 0.05 % cream 60 g 3     Sig: Using a thin layer daily as needed for itching.   Not to exceed 6 weeks of continuous use

## 2020-06-15 NOTE — PATIENT INSTRUCTIONS
SURVEY:    You may be receiving a survey from [employer] regarding your visit today. Please complete the survey to enable us to provide the highest quality of care to you and your family. If you cannot score us a very good on any question, please call the office to discuss how we could have made your experience a very good one. Thank you.

## 2020-06-23 ENCOUNTER — OFFICE VISIT (OUTPATIENT)
Dept: OBGYN CLINIC | Age: 65
End: 2020-06-23
Payer: MEDICARE

## 2020-06-23 ENCOUNTER — HOSPITAL ENCOUNTER (OUTPATIENT)
Age: 65
Setting detail: SPECIMEN
Discharge: HOME | End: 2020-06-23
Payer: MEDICARE

## 2020-06-23 VITALS
SYSTOLIC BLOOD PRESSURE: 138 MMHG | HEIGHT: 66 IN | DIASTOLIC BLOOD PRESSURE: 74 MMHG | WEIGHT: 183 LBS | BODY MASS INDEX: 29.41 KG/M2

## 2020-06-23 PROCEDURE — 3017F COLORECTAL CA SCREEN DOC REV: CPT | Performed by: OBSTETRICS & GYNECOLOGY

## 2020-06-23 PROCEDURE — 1036F TOBACCO NON-USER: CPT | Performed by: OBSTETRICS & GYNECOLOGY

## 2020-06-23 PROCEDURE — 1123F ACP DISCUSS/DSCN MKR DOCD: CPT | Performed by: OBSTETRICS & GYNECOLOGY

## 2020-06-23 PROCEDURE — 1090F PRES/ABSN URINE INCON ASSESS: CPT | Performed by: OBSTETRICS & GYNECOLOGY

## 2020-06-23 PROCEDURE — G8417 CALC BMI ABV UP PARAM F/U: HCPCS | Performed by: OBSTETRICS & GYNECOLOGY

## 2020-06-23 PROCEDURE — G8399 PT W/DXA RESULTS DOCUMENT: HCPCS | Performed by: OBSTETRICS & GYNECOLOGY

## 2020-06-23 PROCEDURE — 99213 OFFICE O/P EST LOW 20 MIN: CPT | Performed by: OBSTETRICS & GYNECOLOGY

## 2020-06-23 PROCEDURE — 4040F PNEUMOC VAC/ADMIN/RCVD: CPT | Performed by: OBSTETRICS & GYNECOLOGY

## 2020-06-23 PROCEDURE — G8427 DOCREV CUR MEDS BY ELIG CLIN: HCPCS | Performed by: OBSTETRICS & GYNECOLOGY

## 2020-06-23 PROCEDURE — 87070 CULTURE OTHR SPECIMN AEROBIC: CPT

## 2020-06-23 NOTE — PROGRESS NOTES
PROBLEM VISIT     Date of service: 2020    Marisol Cazares  Is a 77 y.o.  female    PT's PCP is: Lyla Masters MD     : 1955                                             Subjective:       No LMP recorded. Patient has had a hysterectomy. OB History    Para Term  AB Living   5 5 5     5   SAB TAB Ectopic Molar Multiple Live Births             5      # Outcome Date GA Lbr Ernesto/2nd Weight Sex Delivery Anes PTL Lv   5 Term 89 40w0d  7 lb (3.175 kg) F Vag-Spont   ADA   4 Term 84 40w0d  10 lb (4.536 kg) M Vag-Spont   ADA   3 Term 77 40w0d  9 lb 9 oz (4.338 kg) M Vag-Spont   ADA   2 Term 76 40w0d  7 lb (3.175 kg) F Vag-Spont   ADA   1 Term 75 40w0d  10 lb (4.536 kg) M Vag-Spont   ADA        Social History     Tobacco Use   Smoking Status Never Smoker   Smokeless Tobacco Never Used        Social History     Substance and Sexual Activity   Alcohol Use No       Allergies: Patient has no known allergies. Current Outpatient Medications:     phenylephrine-mineral oil-petrolatum (HEMORRHOIDAL) 0.25-14-74.9 % rectal ointment, Place rectally 2 times daily as needed for Hemorrhoids, Disp: 28 g, Rfl: 0    clobetasol (TEMOVATE) 0.05 % cream, Using a thin layer daily as needed for itching.   Not to exceed 6 weeks of continuous use, Disp: 60 g, Rfl: 3    dilTIAZem (CARDIZEM) 30 MG tablet, Take 1 tablet by mouth 2 times daily, Disp: 60 tablet, Rfl: 3    albuterol (PROVENTIL) (2.5 MG/3ML) 0.083% nebulizer solution, Take 3 mLs by nebulization every 6 hours as needed for Wheezing, Disp: 120 each, Rfl: 1    omeprazole (PRILOSEC) 20 MG delayed release capsule, take 1 capsule by mouth once daily, Disp: , Rfl:     XARELTO 20 MG TABS tablet, take 1 tablet by mouth every morning WITH BREAKFAST, Disp: 90 tablet, Rfl: 1    levothyroxine (SYNTHROID) 50 MCG tablet, Take 1 tablet by mouth Daily, Disp: 90 tablet, Rfl: 1    albuterol sulfate HFA (PROVENTIL HFA) 108 (90 Nodularity absent                                    Vaginal discharge: no vaginal discharge                          Assessment and Plan: Culture was taken since there was some areas of erythema. Of note the lichen planus seems to have improved since I had seen the patient last and she has been using the steroid cream on an intermittent as needed basis          Diagnosis Orders   1. Lichen planus               I am having Courtney Oconnor maintain her triamcinolone, levothyroxine, albuterol sulfate HFA, fluticasone-salmeterol, citalopram, ciclopirox, Xarelto, omeprazole, albuterol, dilTIAZem, Hemorrhoidal, and clobetasol. No follow-ups on file. There are no Patient Instructions on file for this visit. Over 50% of time spent on counseling and care coordination on: see assessment and plan,  She was also counseled on her preventative health maintenance recommendations and follow-up.         FF time: 15 min      Shireen Crawford,6/23/2020 10:28 AM

## 2020-06-27 LAB
CULTURE: NORMAL
Lab: NORMAL
SPECIMEN DESCRIPTION: NORMAL

## 2020-08-03 ENCOUNTER — OFFICE VISIT (OUTPATIENT)
Dept: FAMILY MEDICINE CLINIC | Age: 65
End: 2020-08-03
Payer: MEDICARE

## 2020-08-03 VITALS
RESPIRATION RATE: 16 BRPM | HEIGHT: 66 IN | SYSTOLIC BLOOD PRESSURE: 124 MMHG | OXYGEN SATURATION: 97 % | HEART RATE: 85 BPM | WEIGHT: 198 LBS | BODY MASS INDEX: 31.82 KG/M2 | DIASTOLIC BLOOD PRESSURE: 78 MMHG

## 2020-08-03 PROCEDURE — 1123F ACP DISCUSS/DSCN MKR DOCD: CPT | Performed by: INTERNAL MEDICINE

## 2020-08-03 PROCEDURE — 3017F COLORECTAL CA SCREEN DOC REV: CPT | Performed by: INTERNAL MEDICINE

## 2020-08-03 PROCEDURE — G0438 PPPS, INITIAL VISIT: HCPCS | Performed by: INTERNAL MEDICINE

## 2020-08-03 PROCEDURE — 4040F PNEUMOC VAC/ADMIN/RCVD: CPT | Performed by: INTERNAL MEDICINE

## 2020-08-03 ASSESSMENT — PATIENT HEALTH QUESTIONNAIRE - PHQ9
SUM OF ALL RESPONSES TO PHQ QUESTIONS 1-9: 0
SUM OF ALL RESPONSES TO PHQ QUESTIONS 1-9: 0

## 2020-08-03 ASSESSMENT — LIFESTYLE VARIABLES: HOW OFTEN DO YOU HAVE A DRINK CONTAINING ALCOHOL: 0

## 2020-08-03 NOTE — PROGRESS NOTES
Medicare Annual Wellness Visit  Name: Alek Bull Date: 8/3/2020   MRN: [id number] Sex: Female   Age: 77 y.o. Ethnicity: /   : 1955 Race: Other      Cherylann Smoke is here for Medicare AWV    Screenings for behavioral, psychosocial and functional/safety risks, and cognitive dysfunction are all negative except as indicated below. These results, as well as other patient data from the [address] form, are documented in Flowsheets linked to this Encounter. No Known Allergies    Prior to Visit Medications    Medication Sig Taking? Authorizing Provider   phenylephrine-mineral oil-petrolatum (HEMORRHOIDAL) 0.25-14-74.9 % rectal ointment Place rectally 2 times daily as needed for Hemorrhoids Yes Mariposa Murray MD   clobetasol (TEMOVATE) 0.05 % cream Using a thin layer daily as needed for itching.   Not to exceed 6 weeks of continuous use Yes Mariposa Murray MD   dilTIAZem (CARDIZEM) 30 MG tablet Take 1 tablet by mouth 2 times daily Yes Mariposa Murray MD   albuterol (PROVENTIL) (2.5 MG/3ML) 0.083% nebulizer solution Take 3 mLs by nebulization every 6 hours as needed for Wheezing Yes Mariposa Murray MD   omeprazole (PRILOSEC) 20 MG delayed release capsule take 1 capsule by mouth once daily Yes Reid Glass MD   XARELTO 20 MG TABS tablet take 1 tablet by mouth every morning WITH BREAKFAST Yes Mariposa Murray MD   levothyroxine (SYNTHROID) 50 MCG tablet Take 1 tablet by mouth Daily Yes Mariposa Murray MD   albuterol sulfate HFA (PROVENTIL HFA) 108 (90 Base) MCG/ACT inhaler Inhale 2 puffs into the lungs every 4 hours as needed for Wheezing or Shortness of Breath Yes Mariposa Murray MD   fluticasone-salmeterol (ADVAIR HFA) 115-21 MCG/ACT inhaler Inhale 2 puffs into the lungs 2 times daily Yes Mariposa Murray MD   triamcinolone (KENALOG) 0.1 % cream Apply to the affected area 2 times a day as needed for vulvar itching Yes Mariposa Murray MD   ciclopirox (PENLAC) 8 % solution Apply to the affected nails nightly. Patient not taking: Reported on 8/3/2020  Radha Villa MD   citalopram (CELEXA) 10 MG tablet Take 1 tablet by mouth daily  Patient not taking: Reported on 8/3/2020  Radha Villa MD       Past Medical History:   Diagnosis Date    Arthritis     Asthma     Chickenpox     Hypertension     Hypothyroidism     Measles     Mumps     Osteoarthritis     Smallpox     Ulcer     Ulcer     Whooping cough        Past Surgical History:   Procedure Laterality Date    CHOLECYSTECTOMY, LAPAROSCOPIC N/A 3/4/2019    CHOLECYSTECTOMY LAPAROSCOPIC performed by Carmen Knox MD at [address] [place] [address]  07/20/2015    Saskia Lo MD    HYSTERECTOMY  2006    Vaginal hysterectomy dr Glenn Monroe  07/20/2015    Saskia Lo MD       Family History   Problem Relation Age of Onset    Diabetes Father        CareTeam (Including outside providers/suppliers regularly involved in providing care):   Patient Care Team:  Radha Villa MD as PCP - General (Hospitalist)  Spring Banegas MD as PCP - OBGYN (Obstetrics & Gynecology)  Radha Villa MD as PCP - [facility] Provider  Carmen Knox MD as Surgeon (General Surgery)    Wt Readings from Last 3 Encounters:   08/03/20 198 lb (89.8 kg)   06/23/20 183 lb (83 kg)   06/15/20 196 lb (88.9 kg)     Vitals:    08/03/20 0943   BP: 124/78   Pulse: 85   Resp: 16   SpO2: 97%   Weight: 198 lb (89.8 kg)   Height: 5' 6\" (1.676 m)     Body mass index is 31.96 kg/m². Based upon direct observation of the patient, evaluation of cognition reveals recent and remote memory intact.     General Appearance: alert and oriented to person, place and time, well developed and well- nourished, in no acute distress  Skin: warm and dry, no rash or erythema  Head: normocephalic and atraumatic  Eyes: pupils equal, round, and reactive to light, extraocular eye movements intact, conjunctivae normal  ENT: tympanic membrane, external ear and ear canal normal bilaterally, nose without deformity, nasal mucosa and turbinates normal without polyps  Neck: supple and non-tender without mass, no thyromegaly or thyroid nodules, no cervical lymphadenopathy  Pulmonary/Chest: clear to auscultation bilaterally- no wheezes, rales or rhonchi, normal air movement, no respiratory distress  Cardiovascular: normal rate, regular rhythm, normal S1 and S2, no murmurs, rubs, clicks, or gallops, distal pulses intact, no carotid bruits  Abdomen: soft, non-tender, non-distended, normal bowel sounds, no masses or organomegaly  Extremities: no cyanosis, clubbing or edema  Musculoskeletal: normal range of motion, no joint swelling, deformity or tenderness  Neurologic: reflexes normal and symmetric, no cranial nerve deficit, gait, coordination and speech normal    Patient's complete Health Risk Assessment and screening values have been reviewed and are found in Flowsheets. The following problems were reviewed today and where indicated follow up appointments were made and/or referrals ordered. Positive Risk Factor Screenings with Interventions:     Fall Risk:  2 or more falls in past year?: (!) yes  Fall with injury in past year?: (!) yes  Fall Risk Interventions:    · Home safety tips provided  · Patient declines any further evaluation/treatment for this issue    Cognitive: Words recalled: 2 Words Recalled  Clock Drawing Test (CDT) Score: (!) Abnormal  Total Score Interpretation: Positive Mini-Cog  Cognitive Impairment Interventions:  · Patient declines any further evaluation/treatment for cognitive impairment    General Health:  General  In the past 7 days, have you experienced any of the following?  New or Increased Pain, New or Increased Fatigue, Loneliness, Social Isolation, Stress or Anger?: (!) Stress  Do you get the social and emotional support that you need?: Yes  Do you have a Living Will?: (!) No  General Health Risk Interventions:  · Stress: patient declines any further evaluation/treatment for this issue, states stress is related to occasional anxiety state  · No Living Will: additional information provided regarding how to obtain Living will    Health Habits/Nutrition:  Health Habits/Nutrition  Do you exercise for at least 20 minutes 2-3 times per week?: Yes  Have you lost any weight without trying in the past 3 months?: No  Do you eat fewer than 2 meals per day?: No  Have you seen a dentist within the past year?: Yes  Body mass index is 31.96 kg/m².   Health Habits/Nutrition Interventions:  · Nutritional issues:  educational materials for healthy, well-balanced diet provided    Hearing/Vision:  No exam data present  Hearing/Vision  Do you or your family notice any trouble with your hearing?: (!) Yes  Do you have difficulty driving, watching TV, or doing any of your daily activities because of your eyesight?: (!) Yes  Have you had an eye exam within the past year?: Yes  Hearing/Vision Interventions:  · Hearing concerns:  patient declines any further evaluation/treatment for hearing issues, has hearing aids but does not like to use them   · Vision concerns:  patient encouraged to make appointment with his/her eye specialist    Personalized Preventive Plan   Current Health Maintenance Status  Immunization History   Administered Date(s) Administered    Influenza Virus Vaccine 11/20/2018    Influenza, Davion Feast, IM, (6 mo and older Fluzone, Flulaval, Fluarix and 3 yrs and older Afluria) 11/20/2018    Pneumococcal Conjugate 13-valent ([employer]) 03/26/2018, 11/20/2018        Health Maintenance   Topic Date Due    Annual Wellness Visit (AWV)  05/29/2019    A1C test (Diabetic or Prediabetic)  07/08/2020    DTaP/Tdap/Td vaccine (1 - Tdap) 06/15/2021 (Originally 5/27/1972)    Shingles Vaccine (1 of 2) 06/15/2021 (Originally 5/27/2003)    Pneumococcal 65+ years Vaccine (2 of 2 - PPSV23) 06/15/2021 (Originally 11/20/2019)    Flu vaccine (1) 09/01/2020    TSH testing  06/15/2021    Breast cancer screen  02/26/2022    Lipid screen  09/19/2024    Colon cancer screen colonoscopy  07/20/2025    DEXA (modify frequency per FRAX score)  Completed    Hepatitis C screen  Addressed    Hepatitis A vaccine  Aged Out    Hepatitis B vaccine  Aged Out    Hib vaccine  Aged Out    Meningococcal (ACWY) vaccine  Aged Out     Recommendations for MicroEval Due: see orders and patient instructions/AVS.  . Recommended screening schedule for the next 5-10 years is provided to the patient in written form: see Patient Instructions/AVS.    There are no diagnoses linked to this encounter.

## 2020-09-15 RX ORDER — LEVOTHYROXINE SODIUM 0.05 MG/1
50 TABLET ORAL DAILY
Qty: 90 TABLET | Refills: 1 | Status: SHIPPED | OUTPATIENT
Start: 2020-09-15 | End: 2021-03-05

## 2020-09-15 NOTE — TELEPHONE ENCOUNTER
Active Problem List:     Hypothyroidism     Hx of gastroesophageal reflux (GERD)     Pneumonia of right lower lobe due to infectious organism (HCC)     Prediabetes     Paroxysmal atrial fibrillation (HCC)     Symptomatic cholelithiasis     Pneumonia due to infectious organism     Biliary calculus     Acute asthma exacerbation

## 2020-09-17 ENCOUNTER — OFFICE VISIT (OUTPATIENT)
Dept: FAMILY MEDICINE CLINIC | Age: 65
End: 2020-09-17
Payer: MEDICARE

## 2020-09-17 VITALS
HEART RATE: 76 BPM | OXYGEN SATURATION: 92 % | WEIGHT: 196 LBS | SYSTOLIC BLOOD PRESSURE: 116 MMHG | DIASTOLIC BLOOD PRESSURE: 66 MMHG | RESPIRATION RATE: 18 BRPM | BODY MASS INDEX: 31.5 KG/M2 | HEIGHT: 66 IN

## 2020-09-17 PROBLEM — K80.20 BILIARY CALCULUS: Status: RESOLVED | Noted: 2019-03-04 | Resolved: 2020-09-17

## 2020-09-17 PROBLEM — K80.20 SYMPTOMATIC CHOLELITHIASIS: Status: RESOLVED | Noted: 2019-03-04 | Resolved: 2020-09-17

## 2020-09-17 PROBLEM — J18.9 PNEUMONIA DUE TO INFECTIOUS ORGANISM: Status: RESOLVED | Noted: 2017-09-15 | Resolved: 2020-09-17

## 2020-09-17 PROBLEM — R73.03 PREDIABETES: Status: RESOLVED | Noted: 2017-09-25 | Resolved: 2020-09-17

## 2020-09-17 PROBLEM — F34.1 DYSTHYMIA: Status: ACTIVE | Noted: 2020-09-17

## 2020-09-17 PROBLEM — J45.901 ACUTE ASTHMA EXACERBATION: Status: RESOLVED | Noted: 2020-01-15 | Resolved: 2020-09-17

## 2020-09-17 PROCEDURE — G8427 DOCREV CUR MEDS BY ELIG CLIN: HCPCS | Performed by: INTERNAL MEDICINE

## 2020-09-17 PROCEDURE — G8417 CALC BMI ABV UP PARAM F/U: HCPCS | Performed by: INTERNAL MEDICINE

## 2020-09-17 PROCEDURE — G8399 PT W/DXA RESULTS DOCUMENT: HCPCS | Performed by: INTERNAL MEDICINE

## 2020-09-17 PROCEDURE — 1090F PRES/ABSN URINE INCON ASSESS: CPT | Performed by: INTERNAL MEDICINE

## 2020-09-17 PROCEDURE — 4040F PNEUMOC VAC/ADMIN/RCVD: CPT | Performed by: INTERNAL MEDICINE

## 2020-09-17 PROCEDURE — 1123F ACP DISCUSS/DSCN MKR DOCD: CPT | Performed by: INTERNAL MEDICINE

## 2020-09-17 PROCEDURE — 3017F COLORECTAL CA SCREEN DOC REV: CPT | Performed by: INTERNAL MEDICINE

## 2020-09-17 PROCEDURE — 1036F TOBACCO NON-USER: CPT | Performed by: INTERNAL MEDICINE

## 2020-09-17 PROCEDURE — 99214 OFFICE O/P EST MOD 30 MIN: CPT | Performed by: INTERNAL MEDICINE

## 2020-09-17 ASSESSMENT — ENCOUNTER SYMPTOMS
CONSTIPATION: 0
TROUBLE SWALLOWING: 0
BELCHING: 0
CHEST TIGHTNESS: 0
SORE THROAT: 0
COUGH: 0
SHORTNESS OF BREATH: 0
ABDOMINAL PAIN: 1
BLOOD IN STOOL: 1
NAUSEA: 0
VOMITING: 0
DIARRHEA: 0

## 2020-09-17 NOTE — PROGRESS NOTES
HPI Notes    Name: Bernard Terrazas  : 1955         Chief Complaint:     Chief Complaint   Patient presents with   Michael Megan     states Gallinal a little pain where gall bladder was removed\"    Thyroid Problem       History of Present Illness:        Alicia Thapa presents to office to follow up for depression, Hypothyroidism, GERD. Also c/o abdominal pain. She is accompanied by her daughter Rigoberto Faye who helps with interpretations as Alicia Thapa does not speak Orren Fickle presents as a follow up on her depression. Current medication for depression includes Celexa. Alicia Thapa has been on this medication for a couple of months. States takes as needed  . The medication is  being tolerated well. Since starting the antidepressant the symptoms of depression have significantly improved. Alicia Thapa  is not in counciling. Alicia Thapa denies suicidal ideation. She is having pain at the site where her gallbladder was removed about 2 years ago . States the pain is intermittent maybe once a week  Comes and goes. The pain is in epigastric area. Pain is not related to eating. Feels like fullness   Lasts less than 5 minutes . Alicia Thapa confirms that she is compliant on their thyroid medication. She denies an increase in fatigue, constipation, increased skin dryness, or increased lower extremity edema. The last TSH was 2.57 on 6/15/20    Gastroesophageal Reflux   She complains of abdominal pain. She reports no belching, no chest pain, no coughing, no dysphagia, no nausea or no sore throat. This is a chronic problem. The current episode started more than 1 year ago. The problem occurs occasionally. The problem has been unchanged. The symptoms are aggravated by certain foods. Pertinent negatives include no fatigue. She has tried nothing for the symptoms.            Past Medical History:     Past Medical History:   Diagnosis Date    Arthritis     Asthma     Chickenpox     Hypertension     Hypothyroidism     Measles     Mumps  Osteoarthritis     Smallpox     Ulcer     Ulcer     Whooping cough       Reviewed all health maintenance requirements and orderedappropriate tests  Health Maintenance Due   Topic Date Due    A1C test (Diabetic or Prediabetic)  07/08/2020    Flu vaccine (1) 09/01/2020       Past Surgical History:     Past Surgical History:   Procedure Laterality Date    CHOLECYSTECTOMY, LAPAROSCOPIC N/A 3/4/2019    CHOLECYSTECTOMY LAPAROSCOPIC performed by Yamilex Stone MD at [facility]  07/20/2015    Nain Morales MD    HYSTERECTOMY  2006    Vaginal hysterectomy dr Telma Diaz  07/20/2015    Nain Morales MD        Medications:       Prior to Admission medications    Medication Sig Start Date End Date Taking? Authorizing Provider   levothyroxine (SYNTHROID) 50 MCG tablet Take 1 tablet by mouth Daily 9/15/20  Yes Janene Persaud MD   phenylephrine-mineral oil-petrolatum (HEMORRHOIDAL) 0.25-14-74.9 % rectal ointment Place rectally 2 times daily as needed for Hemorrhoids 6/15/20  Yes Janene Persaud MD   clobetasol (TEMOVATE) 0.05 % cream Using a thin layer daily as needed for itching.   Not to exceed 6 weeks of continuous use 6/15/20  Yes Janene Persaud MD   dilTIAZem (CARDIZEM) 30 MG tablet Take 1 tablet by mouth 2 times daily 4/15/20  Yes Janene Persaud MD   albuterol (PROVENTIL) (2.5 MG/3ML) 0.083% nebulizer solution Take 3 mLs by nebulization every 6 hours as needed for Wheezing 4/13/20  Yes Janene Persaud MD   omeprazole (PRILOSEC) 20 MG delayed release capsule take 1 capsule by mouth once daily 3/9/20  Yes Historical Provider, MD   XARELTO 20 MG TABS tablet take 1 tablet by mouth every morning WITH BREAKFAST 4/2/20  Yes Janene Persaud MD   albuterol sulfate HFA (PROVENTIL HFA) 108 (90 Base) MCG/ACT inhaler Inhale 2 puffs into the lungs every 4 hours as needed for Wheezing or Shortness of Breath 1/10/20  Yes Janene Persaud MD   fluticasone-salmeterol (ADVAIR HFA) 922-29 06/15/2020    MCHC 32.6 06/15/2020    RDW 16.3 06/15/2020     06/15/2020    MPV NOT REPORTED 06/15/2020     Lab Results   Component Value Date    TSH 2.57 06/15/2020     Lab Results   Component Value Date    CHOL 167 09/19/2019    HDL 60 09/19/2019    LABA1C 5.6 07/08/2019          Assessment & Plan        Diagnosis Orders   1. Hypothyroidism, unspecified type   TSH normal, continue on current dose of Synthroid     2. Hx of gastroesophageal reflux (GERD)   Will prescribe Omeprazole     3. Dysthymia   Mood is stable, continue on Celexa,     4. Epigastric pain   Possibly due to GERD, Will try omeprazole   Follow up if no improvement or worsening in pain . Physical examination was benign. Completed Refills   Requested Prescriptions      No prescriptions requested or ordered in this encounter     No follow-ups on file. No orders of the defined types were placed in this encounter. No orders of the defined types were placed in this encounter. There are no Patient Instructions on file for this visit.     Electronically signed by Jaymie Zamarripa MD on 9/17/2020 at 9:38 PM           Completed Refills      Requested Prescriptions      No prescriptions requested or ordered in this encounter

## 2020-09-29 NOTE — TELEPHONE ENCOUNTER
Medication pending    Health Maintenance   Topic Date Due    Flu vaccine (1) 09/01/2020    DTaP/Tdap/Td vaccine (1 - Tdap) 06/15/2021 (Originally 5/27/1972)    Shingles Vaccine (1 of 2) 06/15/2021 (Originally 5/27/2003)    Pneumococcal 65+ years Vaccine (2 of 2 - PPSV23) 06/15/2021 (Originally 11/20/2019)    TSH testing  06/15/2021    Annual Wellness Visit (AWV)  08/04/2021    Breast cancer screen  02/26/2022    Diabetes screen  07/08/2022    Lipid screen  09/19/2024    Colon cancer screen colonoscopy  07/20/2025    DEXA (modify frequency per FRAX score)  Completed    Hepatitis C screen  Addressed    Hepatitis A vaccine  Aged Out    Hepatitis B vaccine  Aged Out    Hib vaccine  Aged Out    Meningococcal (ACWY) vaccine  Aged Out             (applicable per patient's age: Cancer Screenings, Depression Screening, Fall Risk Screening, Immunizations)    Hemoglobin A1C (%)   Date Value   07/08/2019 5.6   03/26/2018 5.9   09/25/2017 6.0     LDL Cholesterol (mg/dL)   Date Value   09/19/2019 87     AST (U/L)   Date Value   06/15/2020 13     ALT (U/L)   Date Value   06/15/2020 16     BUN (mg/dL)   Date Value   06/15/2020 16      (goal A1C is < 7)   (goal LDL is <100) need 30-50% reduction from baseline     BP Readings from Last 3 Encounters:   09/17/20 116/66   08/03/20 124/78   06/23/20 138/74    (goal /80)      All Future Testing planned in [facility]:  Lab Frequency Next Occurrence   CBC Auto Differential Once 12/17/2020   Comprehensive Metabolic Panel Once 82/06/9276   Lipid Panel Once 12/17/2020   TSH without Reflex Once 12/17/2020   Vitamin D 25 Hydroxy Once 12/17/2020   Magnesium Once 12/17/2020   EKG 12 lead Once 12/17/2020   XR CHEST STANDARD (2 VW) Once 12/17/2020       Next Visit Date:  Future Appointments   Date Time Provider Alexey Mosqueda   12/17/2020  2:00 PM [facility]   2/2/2021 10:00 AM MD Alessia Sortos [facility]            Patient Active Problem List:     Hypothyroidism     Hx of gastroesophageal reflux (GERD)     Pneumonia of right lower lobe due to infectious organism [facility])     Paroxysmal atrial fibrillation ([facility] [address]     Dysthymia

## 2020-12-21 ENCOUNTER — OFFICE VISIT (OUTPATIENT)
Dept: FAMILY MEDICINE CLINIC | Age: 65
End: 2020-12-21
Payer: MEDICARE

## 2020-12-21 VITALS
BODY MASS INDEX: 32.47 KG/M2 | SYSTOLIC BLOOD PRESSURE: 118 MMHG | TEMPERATURE: 97.2 F | OXYGEN SATURATION: 96 % | HEART RATE: 78 BPM | DIASTOLIC BLOOD PRESSURE: 73 MMHG | WEIGHT: 201.2 LBS

## 2020-12-21 PROCEDURE — 1123F ACP DISCUSS/DSCN MKR DOCD: CPT | Performed by: INTERNAL MEDICINE

## 2020-12-21 PROCEDURE — 99214 OFFICE O/P EST MOD 30 MIN: CPT | Performed by: INTERNAL MEDICINE

## 2020-12-21 PROCEDURE — G0008 ADMIN INFLUENZA VIRUS VAC: HCPCS | Performed by: INTERNAL MEDICINE

## 2020-12-21 PROCEDURE — G8399 PT W/DXA RESULTS DOCUMENT: HCPCS | Performed by: INTERNAL MEDICINE

## 2020-12-21 PROCEDURE — 90686 IIV4 VACC NO PRSV 0.5 ML IM: CPT | Performed by: INTERNAL MEDICINE

## 2020-12-21 PROCEDURE — 1036F TOBACCO NON-USER: CPT | Performed by: INTERNAL MEDICINE

## 2020-12-21 PROCEDURE — G8417 CALC BMI ABV UP PARAM F/U: HCPCS | Performed by: INTERNAL MEDICINE

## 2020-12-21 PROCEDURE — 1090F PRES/ABSN URINE INCON ASSESS: CPT | Performed by: INTERNAL MEDICINE

## 2020-12-21 PROCEDURE — 3017F COLORECTAL CA SCREEN DOC REV: CPT | Performed by: INTERNAL MEDICINE

## 2020-12-21 PROCEDURE — G8482 FLU IMMUNIZE ORDER/ADMIN: HCPCS | Performed by: INTERNAL MEDICINE

## 2020-12-21 PROCEDURE — G8427 DOCREV CUR MEDS BY ELIG CLIN: HCPCS | Performed by: INTERNAL MEDICINE

## 2020-12-21 PROCEDURE — 4040F PNEUMOC VAC/ADMIN/RCVD: CPT | Performed by: INTERNAL MEDICINE

## 2020-12-21 RX ORDER — CONJUGATED ESTROGENS 0.62 MG/G
CREAM VAGINAL
Qty: 42.5 G | Refills: 0 | Status: SHIPPED | OUTPATIENT
Start: 2020-12-21

## 2020-12-21 RX ORDER — METHYLPREDNISOLONE 4 MG/1
TABLET ORAL
Qty: 1 KIT | Refills: 0 | Status: SHIPPED | OUTPATIENT
Start: 2020-12-21 | End: 2020-12-27

## 2020-12-21 ASSESSMENT — ENCOUNTER SYMPTOMS
ABDOMINAL PAIN: 0
SHORTNESS OF BREATH: 1
COUGH: 1
WHEEZING: 1
CHEST TIGHTNESS: 0
NAUSEA: 0
BACK PAIN: 1

## 2020-12-21 NOTE — PROGRESS NOTES
HPI Notes    Name: Simón Santana  : 1955         Chief Complaint:     Chief Complaint   Patient presents with    Hypothyroidism     Patient presents today for 3 month f/u. Patient reports she is doing good.  Gastroesophageal Reflux       History of Present Illness:        Mrs. Chantell Rosado is a pleasant Fijian speaking woman who presents to office to follow up for Hypothyroidism and GERD. Her daughter Mayte Reeves is with her and helps with interpretation. Maurice Sinclair also  c/o right sided pains for the past couple of weeks. The pain is located around right upper quadrant, radiates to the mid back and sometimes lower back on the right . The pain is sharp, shooting. Was using bleach and some chemicals for cleaning about one month ago. Was \"intoxicated\" by breathing chemical fumes. Was coughing a lot and feeling SOB. She also cleaned the snow from her driveway and that exacerbated the side pain. Maurice Sinclair confirms that she is compliant with her thyroid medication. She denies an increase in fatigue, constipation, increased skin dryness, or increased lower extremity edema. The last TSH was 2.57 on 6/15/20    Maurice Sinclair also c/o vaginal dryness and itching. Was evaluated by Dr. Remi Dawson and prescribed steroid cream. She feels that it made the symptoms worse. In the past used Premarin vaginal cream and it helped better. She is asking to try it again     Gastroesophageal Reflux  She complains of coughing and wheezing. She reports no abdominal pain, no chest pain or no nausea. This is a chronic problem. The current episode started more than 1 year ago. The problem occurs rarely. The problem has been gradually improving. The symptoms are aggravated by certain foods. Pertinent negatives include no fatigue. Risk factors include obesity. She has tried a PPI for the symptoms. The treatment provided significant relief.            Past Medical History:     Past Medical History:   Diagnosis Date    Arthritis     Asthma  Chickenpox     Hypertension     Hypothyroidism     Measles     Mumps     Osteoarthritis     Smallpox     Ulcer     Ulcer     Whooping cough       Reviewed all health maintenance requirements and orderedappropriate tests  There are no preventive care reminders to display for this patient. Past Surgical History:     Past Surgical History:   Procedure Laterality Date    CHOLECYSTECTOMY, LAPAROSCOPIC N/A 3/4/2019    CHOLECYSTECTOMY LAPAROSCOPIC performed by Mayco Urias MD at [facility] COLONOSCOPY  07/20/2015    Ray Vasquez MD    HYSTERECTOMY  2006    Vaginal hysterectomy dr Danie Anne  07/20/2015    Ray Vasquez MD        Medications:       Prior to Admission medications    Medication Sig Start Date End Date Taking? Authorizing Provider   methylPREDNISolone (MEDROL DOSEPACK) 4 MG tablet Take by mouth. 12/21/20 12/27/20 Yes Henrry Hartley MD   conjugated estrogens (PREMARIN) 0.625 MG/GM vaginal cream Apply 2 gram intravaginally once a day at bed time x 2 wks  And then, 1 gram once a day x 7 days as needed 12/21/20  Yes Henrry Hartley MD   rivaroxaban (XARELTO) 20 MG TABS tablet take 1 tablet by mouth every morning WITH BREAKFAST 9/29/20  Yes Henrry Hartley MD   dilTIAZem (CARDIZEM) 30 MG tablet take 1 tablet by mouth twice a day 9/18/20  Yes Henrry Hartley MD   levothyroxine (SYNTHROID) 50 MCG tablet Take 1 tablet by mouth Daily 9/15/20  Yes Henrry Hartley MD   phenylephrine-mineral oil-petrolatum (HEMORRHOIDAL) 0.25-14-74.9 % rectal ointment Place rectally 2 times daily as needed for Hemorrhoids 6/15/20  Yes Henrry Hartley MD   clobetasol (TEMOVATE) 0.05 % cream Using a thin layer daily as needed for itching.   Not to exceed 6 weeks of continuous use 6/15/20  Yes Henrry Hartley MD   albuterol (PROVENTIL) (2.5 MG/3ML) 0.083% nebulizer solution Take 3 mLs by nebulization every 6 hours as needed for Wheezing 4/13/20  Yes Henrry Hartley MD omeprazole (PRILOSEC) 20 MG delayed release capsule take 1 capsule by mouth once daily 3/9/20  Yes Historical Provider, MD   albuterol sulfate HFA (PROVENTIL HFA) 108 (90 Base) MCG/ACT inhaler Inhale 2 puffs into the lungs every 4 hours as needed for Wheezing or Shortness of Breath 1/10/20  Yes Hetal Knott MD   fluticasone-salmeterol (ADVAIR HFA) 738-63 MCG/ACT inhaler Inhale 2 puffs into the lungs 2 times daily 1/10/20  Yes Hetal Knott MD   triamcinolone (KENALOG) 0.1 % cream Apply to the affected area 2 times a day as needed for vulvar itching 12/10/19  Yes Hetal Knott MD        Allergies:       Patient has no known allergies. Social History:     Tobacco: reports that she has never smoked. She has never used smokeless tobacco.  Alcohol:      reports no history of alcohol use. Drug Use:  reports no history of drug use. Family History:     Family History   Problem Relation Age of Onset    Diabetes Father        Review of Systems:         Review of Systems   Constitutional: Negative for activity change, appetite change, chills and fatigue. HENT: Negative for congestion. Respiratory: Positive for cough, shortness of breath and wheezing. Negative for chest tightness. Cardiovascular: Negative for chest pain, palpitations and leg swelling. Gastrointestinal: Negative for abdominal pain and nausea. Genitourinary: Negative for dysuria. Vaginal itching   Musculoskeletal: Positive for back pain. Right side pain   Skin: Negative for rash. Neurological: Negative for dizziness. Psychiatric/Behavioral: The patient is not nervous/anxious. Physical Exam:     Vitals:  /73 (Site: Left Upper Arm, Position: Sitting, Cuff Size: Medium Adult)   Pulse 78   Temp 97.2 °F (36.2 °C)   Wt 201 lb 3.2 oz (91.3 kg)   SpO2 96%   BMI 32.47 kg/m²       Physical Exam  Vitals signs reviewed. Constitutional:       General: She is not in acute distress. Appearance: She is well-developed. HENT:      Head: Normocephalic and atraumatic. Right Ear: Tympanic membrane, ear canal and external ear normal.      Left Ear: Tympanic membrane, ear canal and external ear normal.      Nose: No congestion. Mouth/Throat:      Pharynx: Oropharynx is clear. Eyes:      General:         Right eye: No discharge. Left eye: No discharge. Conjunctiva/sclera: Conjunctivae normal.   Neck:      Thyroid: No thyromegaly. Cardiovascular:      Rate and Rhythm: Normal rate and regular rhythm. Heart sounds: Normal heart sounds. No murmur. Pulmonary:      Effort: Pulmonary effort is normal.      Breath sounds: Normal breath sounds. No wheezing or rales. Abdominal:      General: Bowel sounds are normal. There is no distension. Palpations: Abdomen is soft. There is no mass. Tenderness: There is no abdominal tenderness. Musculoskeletal: Normal range of motion. General: Tenderness (right lower rib cage area, flank and back tenderness on palpation) present. Right lower leg: No edema. Left lower leg: No edema. Lymphadenopathy:      Cervical: No cervical adenopathy. Skin:     General: Skin is warm and dry. Coloration: Skin is not jaundiced or pale. Findings: No rash. Neurological:      Mental Status: She is alert and oriented to person, place, and time.    Psychiatric:         Mood and Affect: Mood normal.         Behavior: Behavior normal.         Judgment: Judgment normal.               Data:     Lab Results   Component Value Date     06/15/2020    K 4.5 06/15/2020    CL 98 06/15/2020    CO2 31 06/15/2020    BUN 16 06/15/2020    CREATININE 0.77 06/15/2020    GLUCOSE 118 06/15/2020    PROT 8.7 06/15/2020    LABALBU 4.3 06/15/2020    BILITOT 0.25 06/15/2020    ALKPHOS 97 06/15/2020    AST 13 06/15/2020    ALT 16 06/15/2020     Lab Results   Component Value Date    WBC 6.2 06/15/2020    RBC 5.07 06/15/2020 HGB 13.8 06/15/2020    HCT 42.2 06/15/2020    MCV 83.2 06/15/2020    MCH 27.1 06/15/2020    MCHC 32.6 06/15/2020    RDW 16.3 06/15/2020     06/15/2020    MPV NOT REPORTED 06/15/2020     Lab Results   Component Value Date    TSH 2.57 06/15/2020     Lab Results   Component Value Date    CHOL 167 09/19/2019    HDL 60 09/19/2019    LABA1C 5.6 07/08/2019          Assessment & Plan        Diagnosis Orders   1. Hx of gastroesophageal reflux (GERD)   Symptoms are stable, continue on Omeprazole. 2. Hypothyroidism, unspecified type  Well controlled , continue on current Synthroid dose 50 mcg/day     3. Need for influenza vaccination  INFLUENZA, QUADV, 3 YRS AND OLDER, IM PF, PREFILL SYR OR SDV, 0.5ML (AFLURIA QUADV, PF)   4. Musculoskeletal chest pain   Will try medrol pack. She is on Xarelto and prefers to avoid NSAIDs. methylPREDNISolone (MEDROL DOSEPACK) 4 MG tablet   5. Vaginal dryness   Prescribed Premarin vaginal cream.  conjugated estrogens (PREMARIN) 0.625 MG/GM vaginal cream                   Completed Refills   Requested Prescriptions     Signed Prescriptions Disp Refills    methylPREDNISolone (MEDROL DOSEPACK) 4 MG tablet 1 kit 0     Sig: Take by mouth.  conjugated estrogens (PREMARIN) 0.625 MG/GM vaginal cream 42.5 g 0     Sig: Apply 2 gram intravaginally once a day at bed time x 2 wks  And then, 1 gram once a day x 7 days as needed     Return in about 6 months (around 6/21/2021). Orders Placed This Encounter   Medications    methylPREDNISolone (MEDROL DOSEPACK) 4 MG tablet     Sig: Take by mouth.      Dispense:  1 kit     Refill:  0    conjugated estrogens (PREMARIN) 0.625 MG/GM vaginal cream     Sig: Apply 2 gram intravaginally once a day at bed time x 2 wks  And then, 1 gram once a day x 7 days as needed     Dispense:  42.5 g     Refill:  0     Orders Placed This Encounter   Procedures    INFLUENZA, QUADV, 3 YRS AND OLDER, IM PF, PREFILL SYR OR SDV, 0.5ML (AFLURIA QUADV, PF) Patient Instructions   SURVEY:    You may be receiving a survey from [employer] regarding your visit today. Please complete the survey to enable us to provide the highest quality of care to you and your family. If you cannot score us a very good on any question, please call the office to discuss how we could of made your experience a very good one. Thank you. You may be receiving a survey from [employer] regarding your visit today. You may get this in the mail, through your MyChart or in your email. Please complete the survey to enable us to provide the highest quality of care to you and your family. If you cannot score us as very good ( 5 Stars) on any question, please feel free to call the office to discuss how we could have made your experience exceptional.     Thank You! Dudley Salazar MD  [facility]  [facility], [facility]        Electronically signed by Dudley Salazar MD on 12/21/2020 at 7:46 PM           Completed Refills      Requested Prescriptions     Signed Prescriptions Disp Refills    methylPREDNISolone (MEDROL DOSEPACK) 4 MG tablet 1 kit 0     Sig: Take by mouth.     conjugated estrogens (PREMARIN) 0.625 MG/GM vaginal cream 42.5 g 0     Sig: Apply 2 gram intravaginally once a day at bed time x 2 wks  And then, 1 gram once a day x 7 days as needed

## 2020-12-21 NOTE — PATIENT INSTRUCTIONS
SURVEY:    You may be receiving a survey from [email or web address] regarding your visit today. Please complete the survey to enable us to provide the highest quality of care to you and your family. If you cannot score us a very good on any question, please call the office to discuss how we could of made your experience a very good one. Thank you. You may be receiving a survey from [email or web address] regarding your visit today. You may get this in the mail, through your MyChart or in your email. Please complete the survey to enable us to provide the highest quality of care to you and your family. If you cannot score us as very good ( 5 Stars) on any question, please feel free to call the office to discuss how we could have made your experience exceptional.     Thank You!       MD Montez Sandoval RMA Patience Fleming, PCA

## 2020-12-21 NOTE — PROGRESS NOTES
After obtaining consent, and per orders of Dr. Gloria Fletcher, injection of afluria given in Left deltoid by Tashi Bolanos. Patient instructed to remain in clinic for 20 minutes afterwards, and to report any adverse reaction to me immediately. Vaccine Information Sheet, \"Influenza - Inactivated\"  given to Tyrone Todd, or parent/legal guardian of  Tyrone Todd and verbalized understanding. Patient responses:    Have you ever had a reaction to a flu vaccine? No  Are you able to eat eggs without adverse effects? Yes  Do you have any current illness? No  Have you ever had Guillian Mize Syndrome? No    Flu vaccine given per order. Please see immunization tab.

## 2020-12-28 ENCOUNTER — TELEPHONE (OUTPATIENT)
Dept: FAMILY MEDICINE CLINIC | Age: 65
End: 2020-12-28

## 2020-12-28 NOTE — TELEPHONE ENCOUNTER
Anant, patient's daughter was notified and voiced understanding. She wants to know if you are sending her in something different or not. Please advise. Thank you.

## 2020-12-28 NOTE — TELEPHONE ENCOUNTER
Unfortunately there is no substitute for steroids. I would recommend to try OTC patches with lidocaine.   I try to order lidocaine Rx however it looks like insurance does not reimburse  Thank you

## 2020-12-28 NOTE — TELEPHONE ENCOUNTER
Patients daughter called the office. She said her mom is having a side effect to the steroid she in on. She states her mom is having stomach issues and is constantly in the bathroom. She wants to know if her mom should stop the medication or you could have her try something different.     Health Maintenance   Topic Date Due    DTaP/Tdap/Td vaccine (1 - Tdap) 06/15/2021 (Originally 5/27/1972)    Shingles Vaccine (1 of 2) 06/15/2021 (Originally 5/27/2003)    Pneumococcal 65+ years Vaccine (2 of 2 - PPSV23) 06/15/2021 (Originally 11/20/2019)    TSH testing  06/15/2021    Annual Wellness Visit (AWV)  08/04/2021    Breast cancer screen  02/26/2022    Diabetes screen  07/08/2022    Lipid screen  09/19/2024    Colon cancer screen colonoscopy  07/20/2025    DEXA (modify frequency per FRAX score)  Completed    Flu vaccine  Completed    Hepatitis C screen  Addressed    Hepatitis A vaccine  Aged Out    Hepatitis B vaccine  Aged Out    Hib vaccine  Aged Out    Meningococcal (ACWY) vaccine  Aged Out             (applicable per patient's age: Cancer Screenings, Depression Screening, Fall Risk Screening, Immunizations)    Hemoglobin A1C (%)   Date Value   07/08/2019 5.6   03/26/2018 5.9   09/25/2017 6.0     LDL Cholesterol (mg/dL)   Date Value   09/19/2019 87     AST (U/L)   Date Value   06/15/2020 13     ALT (U/L)   Date Value   06/15/2020 16     BUN (mg/dL)   Date Value   06/15/2020 16      (goal A1C is < 7)   (goal LDL is <100) need 30-50% reduction from baseline     BP Readings from Last 3 Encounters:   12/21/20 118/73   09/17/20 116/66   08/03/20 124/78    (goal /80)      All Future Testing planned in [facility]:  Lab Frequency Next Occurrence   CBC Auto Differential Once 12/17/2020   Comprehensive Metabolic Panel Once 37/51/5703   Lipid Panel Once 12/17/2020   TSH without Reflex Once 12/17/2020   Vitamin D 25 Hydroxy Once 12/17/2020   Magnesium Once 12/17/2020   EKG 12 lead Once 01/27/2021   XR CHEST STANDARD (2 VW) Once 12/17/2020       Next Visit Date:  Future Appointments   Date Time Provider Alexey Mosqueda   2/2/2021 10:00 AM MD Shiloh Carr [facility]   6/21/2021 10:30 AM Wu Coleman MD [facility]            Patient Active Problem List:     Hypothyroidism     Hx of gastroesophageal reflux (GERD)     Pneumonia of right lower lobe due to infectious organism     Paroxysmal atrial fibrillation ([facility] [address]     Dysthymia

## 2021-01-06 DIAGNOSIS — I48.91 ATRIAL FIBRILLATION, UNSPECIFIED TYPE (HCC): ICD-10-CM

## 2021-01-06 NOTE — TELEPHONE ENCOUNTER
Refill request   Last OV 12/21/2020  Last date RX filled 9/29/20  Next scheduled appt 6/21/2021  Medication pended if approved.      Health Maintenance   Topic Date Due    DTaP/Tdap/Td vaccine (1 - Tdap) 06/15/2021 (Originally 5/27/1974)    Shingles Vaccine (1 of 2) 06/15/2021 (Originally 5/27/2003)    Pneumococcal 65+ years Vaccine (2 of 2 - PPSV23) 06/15/2021 (Originally 11/20/2019)    TSH testing  06/15/2021    Annual Wellness Visit (AWV)  08/04/2021    Breast cancer screen  02/26/2022    Diabetes screen  07/08/2022    Lipid screen  09/19/2024    Colon cancer screen colonoscopy  07/20/2025    DEXA (modify frequency per FRAX score)  Completed    Flu vaccine  Completed    Hepatitis C screen  Addressed    Hepatitis A vaccine  Aged Out    Hepatitis B vaccine  Aged Out    Hib vaccine  Aged Out    Meningococcal (ACWY) vaccine  Aged Out             (applicable per patient's age: Cancer Screenings, Depression Screening, Fall Risk Screening, Immunizations)    Hemoglobin A1C (%)   Date Value   07/08/2019 5.6   03/26/2018 5.9   09/25/2017 6.0     LDL Cholesterol (mg/dL)   Date Value   09/19/2019 87     AST (U/L)   Date Value   06/15/2020 13     ALT (U/L)   Date Value   06/15/2020 16     BUN (mg/dL)   Date Value   06/15/2020 16      (goal A1C is < 7)   (goal LDL is <100) need 30-50% reduction from baseline     BP Readings from Last 3 Encounters:   12/21/20 118/73   09/17/20 116/66   08/03/20 124/78    (goal /80)      All Future Testing planned in [facility]:  Lab Frequency Next Occurrence   CBC Auto Differential Once 02/02/2021   Comprehensive Metabolic Panel Once 83/25/9483   Lipid Panel Once 02/02/2021   TSH without Reflex Once 02/02/2021   Vitamin D 25 Hydroxy Once 02/02/2021   Magnesium Once 02/02/2021   EKG 12 lead Once 01/27/2021   XR CHEST STANDARD (2 VW) Once 01/26/2021       Next Visit Date:  Future Appointments   Date Time Provider Alexey Mosqueda   2/2/2021 10:00 AM MD Kadeem Davidson [facility]   [facility]21[facility]2021 10:30 [facility]            Patient Active Problem List:     Hypothyroidism     Hx of gastroesophageal reflux (GERD)     Pneumonia of right lower lobe due to infectious organism     Paroxysmal atrial fibrillation ([address]     Dysthymia

## 2021-01-30 ENCOUNTER — HOSPITAL ENCOUNTER (OUTPATIENT)
Age: 66
Discharge: HOME | End: 2021-02-01
Payer: MEDICARE

## 2021-01-30 ENCOUNTER — HOSPITAL ENCOUNTER (OUTPATIENT)
Age: 66
Discharge: HOME | End: 2021-01-30
Payer: MEDICARE

## 2021-01-30 ENCOUNTER — HOSPITAL ENCOUNTER (OUTPATIENT)
Dept: GENERAL RADIOLOGY | Age: 66
Discharge: HOME | End: 2021-02-01
Payer: MEDICARE

## 2021-01-30 DIAGNOSIS — Z87.19 HX OF GASTROESOPHAGEAL REFLUX (GERD): ICD-10-CM

## 2021-01-30 DIAGNOSIS — E03.9 HYPOTHYROIDISM, UNSPECIFIED TYPE: ICD-10-CM

## 2021-01-30 DIAGNOSIS — J45.901 ASTHMA WITH ACUTE EXACERBATION, UNSPECIFIED ASTHMA SEVERITY, UNSPECIFIED WHETHER PERSISTENT: ICD-10-CM

## 2021-01-30 DIAGNOSIS — I48.0 PAROXYSMAL ATRIAL FIBRILLATION (HCC): ICD-10-CM

## 2021-01-30 DIAGNOSIS — R73.03 PREDIABETES: ICD-10-CM

## 2021-01-30 DIAGNOSIS — E55.9 VITAMIN D DEFICIENCY, UNSPECIFIED: ICD-10-CM

## 2021-01-30 LAB
ABSOLUTE EOS #: 0 K/UL (ref 0–0.4)
ABSOLUTE IMMATURE GRANULOCYTE: ABNORMAL K/UL (ref 0–0.3)
ABSOLUTE LYMPH #: 1.4 K/UL (ref 1–4.8)
ABSOLUTE MONO #: 0.5 K/UL (ref 0–1)
ALBUMIN SERPL-MCNC: 4.2 G/DL (ref 3.5–5.2)
ALBUMIN/GLOBULIN RATIO: ABNORMAL (ref 1–2.5)
ALP BLD-CCNC: 117 U/L (ref 35–104)
ALT SERPL-CCNC: 20 U/L (ref 5–33)
ANION GAP SERPL CALCULATED.3IONS-SCNC: 8 MMOL/L (ref 9–17)
AST SERPL-CCNC: 15 U/L
BASOPHILS # BLD: 1 % (ref 0–2)
BASOPHILS ABSOLUTE: 0 K/UL (ref 0–0.2)
BILIRUB SERPL-MCNC: 0.56 MG/DL (ref 0.3–1.2)
BUN BLDV-MCNC: 15 MG/DL (ref 8–23)
BUN/CREAT BLD: 18 (ref 9–20)
CALCIUM SERPL-MCNC: 10.2 MG/DL (ref 8.6–10.4)
CHLORIDE BLD-SCNC: 100 MMOL/L (ref 98–107)
CHOLESTEROL/HDL RATIO: 2.7
CHOLESTEROL: 156 MG/DL
CO2: 29 MMOL/L (ref 20–31)
CREAT SERPL-MCNC: 0.84 MG/DL (ref 0.5–0.9)
DIFFERENTIAL TYPE: YES
EOSINOPHILS RELATIVE PERCENT: 0 % (ref 0–5)
GFR AFRICAN AMERICAN: >60 ML/MIN
GFR NON-AFRICAN AMERICAN: >60 ML/MIN
GFR SERPL CREATININE-BSD FRML MDRD: ABNORMAL ML/MIN/{1.73_M2}
GFR SERPL CREATININE-BSD FRML MDRD: ABNORMAL ML/MIN/{1.73_M2}
GLUCOSE BLD-MCNC: 97 MG/DL (ref 70–99)
HCT VFR BLD CALC: 45.6 % (ref 36–46)
HDLC SERPL-MCNC: 57 MG/DL
HEMOGLOBIN: 15.1 G/DL (ref 12–16)
IMMATURE GRANULOCYTES: ABNORMAL %
LDL CHOLESTEROL: 78 MG/DL (ref 0–130)
LYMPHOCYTES # BLD: 23 % (ref 15–40)
MAGNESIUM: 2.3 MG/DL (ref 1.6–2.6)
MCH RBC QN AUTO: 28.7 PG (ref 26–34)
MCHC RBC AUTO-ENTMCNC: 33.2 G/DL (ref 31–37)
MCV RBC AUTO: 86.4 FL (ref 80–100)
MONOCYTES # BLD: 8 % (ref 4–8)
NRBC AUTOMATED: ABNORMAL PER 100 WBC
PATIENT FASTING?: YES
PDW BLD-RTO: 14.3 % (ref 12.1–15.2)
PLATELET # BLD: 294 K/UL (ref 140–450)
PLATELET ESTIMATE: ABNORMAL
PMV BLD AUTO: ABNORMAL FL (ref 6–12)
POTASSIUM SERPL-SCNC: 4.1 MMOL/L (ref 3.7–5.3)
RBC # BLD: 5.28 M/UL (ref 4–5.2)
RBC # BLD: ABNORMAL 10*6/UL
SEG NEUTROPHILS: 68 % (ref 47–75)
SEGMENTED NEUTROPHILS ABSOLUTE COUNT: 4.1 K/UL (ref 2.5–7)
SODIUM BLD-SCNC: 137 MMOL/L (ref 135–144)
TOTAL PROTEIN: 8.5 G/DL (ref 6.4–8.3)
TRIGL SERPL-MCNC: 104 MG/DL
TSH SERPL DL<=0.05 MIU/L-ACNC: 2.94 MIU/L (ref 0.3–5)
VITAMIN D 25-HYDROXY: 26.5 NG/ML (ref 30–100)
VLDLC SERPL CALC-MCNC: NORMAL MG/DL (ref 1–30)
WBC # BLD: 5.9 K/UL (ref 3.5–11)
WBC # BLD: ABNORMAL 10*3/UL

## 2021-01-30 PROCEDURE — 85025 COMPLETE CBC W/AUTO DIFF WBC: CPT

## 2021-01-30 PROCEDURE — 71046 X-RAY EXAM CHEST 2 VIEWS: CPT

## 2021-01-30 PROCEDURE — 93005 ELECTROCARDIOGRAM TRACING: CPT

## 2021-01-30 PROCEDURE — 80061 LIPID PANEL: CPT

## 2021-01-30 PROCEDURE — 83735 ASSAY OF MAGNESIUM: CPT

## 2021-01-30 PROCEDURE — 80053 COMPREHEN METABOLIC PANEL: CPT

## 2021-01-30 PROCEDURE — 82306 VITAMIN D 25 HYDROXY: CPT

## 2021-01-30 PROCEDURE — 84443 ASSAY THYROID STIM HORMONE: CPT

## 2021-01-30 PROCEDURE — 36415 COLL VENOUS BLD VENIPUNCTURE: CPT

## 2021-01-31 LAB
EKG ATRIAL RATE: 72 BPM
EKG P AXIS: 49 DEGREES
EKG P-R INTERVAL: 140 MS
EKG Q-T INTERVAL: 392 MS
EKG QRS DURATION: 106 MS
EKG QTC CALCULATION (BAZETT): 429 MS
EKG R AXIS: 77 DEGREES
EKG T AXIS: 74 DEGREES
EKG VENTRICULAR RATE: 72 BPM

## 2021-01-31 PROCEDURE — 93010 ELECTROCARDIOGRAM REPORT: CPT | Performed by: INTERNAL MEDICINE

## 2021-02-02 ENCOUNTER — OFFICE VISIT (OUTPATIENT)
Dept: CARDIOLOGY CLINIC | Age: 66
End: 2021-02-02
Payer: MEDICARE

## 2021-02-02 VITALS
WEIGHT: 200 LBS | BODY MASS INDEX: 32.28 KG/M2 | HEART RATE: 72 BPM | SYSTOLIC BLOOD PRESSURE: 134 MMHG | OXYGEN SATURATION: 97 % | DIASTOLIC BLOOD PRESSURE: 70 MMHG

## 2021-02-02 DIAGNOSIS — Z87.19 HX OF GASTROESOPHAGEAL REFLUX (GERD): ICD-10-CM

## 2021-02-02 DIAGNOSIS — E03.9 HYPOTHYROIDISM, UNSPECIFIED TYPE: ICD-10-CM

## 2021-02-02 DIAGNOSIS — J45.901 ASTHMA WITH ACUTE EXACERBATION, UNSPECIFIED ASTHMA SEVERITY, UNSPECIFIED WHETHER PERSISTENT: ICD-10-CM

## 2021-02-02 DIAGNOSIS — I48.0 PAROXYSMAL ATRIAL FIBRILLATION (HCC): Primary | ICD-10-CM

## 2021-02-02 DIAGNOSIS — E55.9 VITAMIN D DEFICIENCY, UNSPECIFIED: ICD-10-CM

## 2021-02-02 PROCEDURE — 4040F PNEUMOC VAC/ADMIN/RCVD: CPT | Performed by: INTERNAL MEDICINE

## 2021-02-02 PROCEDURE — G8399 PT W/DXA RESULTS DOCUMENT: HCPCS | Performed by: INTERNAL MEDICINE

## 2021-02-02 PROCEDURE — G8427 DOCREV CUR MEDS BY ELIG CLIN: HCPCS | Performed by: INTERNAL MEDICINE

## 2021-02-02 PROCEDURE — 1090F PRES/ABSN URINE INCON ASSESS: CPT | Performed by: INTERNAL MEDICINE

## 2021-02-02 PROCEDURE — 1123F ACP DISCUSS/DSCN MKR DOCD: CPT | Performed by: INTERNAL MEDICINE

## 2021-02-02 PROCEDURE — 99213 OFFICE O/P EST LOW 20 MIN: CPT | Performed by: INTERNAL MEDICINE

## 2021-02-02 PROCEDURE — G8482 FLU IMMUNIZE ORDER/ADMIN: HCPCS | Performed by: INTERNAL MEDICINE

## 2021-02-02 PROCEDURE — G8417 CALC BMI ABV UP PARAM F/U: HCPCS | Performed by: INTERNAL MEDICINE

## 2021-02-02 PROCEDURE — 3017F COLORECTAL CA SCREEN DOC REV: CPT | Performed by: INTERNAL MEDICINE

## 2021-02-02 PROCEDURE — 1036F TOBACCO NON-USER: CPT | Performed by: INTERNAL MEDICINE

## 2021-02-02 RX ORDER — BENZONATATE 100 MG/1
100 CAPSULE ORAL 3 TIMES DAILY PRN
COMMUNITY
End: 2021-06-21

## 2021-02-02 NOTE — LETTER
Gretel Hendricks M.D. [address] [place] [address] [facility]   [phone]        2021        Barbara Garcia MD  [address] [facility], [address] [facility]    RE:   India Peres   :  1955    Dear Dr. Chiara Wyatt:    CHIEF COMPLAINT:  1. Paroxysmal atrial fibrillation. 2.  On Xarelto, which she is tolerating nicely. HISTORY OF PRESENT ILLNESS:  I had the pleasure of seeing Mrs. Sai Kirkland in the office on 2021. She is a very pleasant 64-year-old [place] American female, who I see along with her . Her son, Rossana King, usually comes with them to translate. She has a history of atrial fibrillation with RVR discovered on 09/15/2017, when she presented to the hospital with right lower lobe pneumonia, converted back to sinus rhythm 12 hours later and was totally asymptomatic. Echocardiogram showed an EF of 55%, with moderate-to-severe LVH, with a normal stress test.  She has been anticoagulated since that time. She had a cholecystectomy in 2019. She has had pain in her right flank since that time. I had done a CT scan in 2019, which was negative for any mass at that time. She continues to complain of the right flank pain. She has never had a myocardial infarction, never had a catheterization. She was under stress last time I saw her because her father, who was 80years old, lived in [facility], was not well. He evidently  shortly thereafter. She has done well. She was having depression but having no symptoms since that time. Her son states that she overall is doing very well. CARDIAC RISK FACTORS:  Hypertension:  Positive. Other Family Members:  Positive. Peripheral Vascular Disease:  Negative. Diabetes:  Negative. Smoking:  Negative. Hyperlipidemia:  Negative. MEDICATIONS AT HOME:  She is currently on Proventil inhaler p.r.n., Tessalon 100 mg t.i.d., Premarin 0.625 mg vaginal cream, Cardizem 30 mg b.i.d., Advair 2 puffs b.i.d., Synthroid 50 mcg daily, Prilosec 20 mg daily, Xarelto 20 mg daily. PAST MEDICAL AND SURGICAL HISTORY:  1. Peptic ulcer. 2.  Euthyroid, on treatment. 3.  Hypertension. 4.  Asthma. 5.  Cholecystectomy in 03/2019. FAMILY HISTORY:  Mother had diabetes and CAD with a pacemaker. Father had diabetes. SOCIAL HISTORY:  She is 77years old, , 5 children. Her son, Lydia Granadso, has his own grocery store in [facility]. Lydia Granados has a boy, 15, and a girl, 15. Mrs. Velia Veliz has 2 daughters, Curt Dill, who is her oldest daughter, and Neema Jovel, the youngest daughter, who is an  at [address] [place] [address]. One son is an  at the [employer], another son has a home in [place] and builds [employer], and Lydia Granados has a grocery store here. She does not smoke or drink alcohol. Does not exercise but stays active. She was under much stress with her father but since he passed away, she is overall doing much better. REVIEW OF SYSTEMS:  Cardiac as above. Other systems reviewed including constitutional, eyes, ears, nose and throat, cardiovascular, respiratory, GI, , musculoskeletal, integumentary, neurologic, endocrine, hematologic and allergic/immunologic, are negative except for what is described above. No weight loss or weight gain. No change in bowel habits, no blood in stools. No fevers, sweats or chills. PHYSICAL EXAMINATION:  VITAL SIGNS:  Her blood pressure was 134/70 with a heart rate of 72 and regular. Respiratory rate 18. O2 sat was 97%. Weight 200 pounds. GENERAL:  She is a pleasant 58-year-old female. She was oriented to person, place and time. She did complain of her flank pain. It was nontender. SKIN:  No unusual skin changes. HEENT:  The pupils are equally round and intact. Mucous membranes were dry. DISCUSSION:  Mrs. Jian Portillo overall is doing much better from emotional standpoint. She had been markedly depressed but that has subsided. Her son agrees that she is doing well. I made no change in medications. I will plan on seeing her in 1 year in followup. If she would have any new symptoms of chest pain, shortness of breath or loss of energy, of course, I would want to see her earlier. Thank you very much for allowing me the privilege of seeing Mrs. Jian Portillo. If you have any questions on my thoughts, please do not hesitate to contact me.      Sincerely,        Ankit Shepard    D: 02/02/2021 10:47:36     T: 02/02/2021 10:55:18     [email or web address]  Job#: [id number]   Doc#: [id number]

## 2021-02-02 NOTE — PROGRESS NOTES
Ov DR Mary Anne Martínez 1 year follow up  Pain rt side went to chiropractor  Didn't help much   Pain all the time. Difficulty sleeping on rt side  Pain rib area and back   Not new. No chest pain or sob. No hospitalizations or procedures  Father did pass away before his  100th birthday.  May 2020. Will see in 1 year.

## 2021-02-03 NOTE — PROGRESS NOTES
Zarina Booth M.D. [address] [place] [address] Edwar Velez   [phone]        2021        Ramakrishna Nettles MD  [address] [facility], [address] [facility]    RE:   Kyle Notice   :  1955    Dear Dr. Jarred Chen:    CHIEF COMPLAINT:  1. Paroxysmal atrial fibrillation. 2.  On Xarelto, which she is tolerating nicely. HISTORY OF PRESENT ILLNESS:  I had the pleasure of seeing Mrs. Velia Veliz in the office on 2021. She is a very pleasant 57-year-old [place] American female, who I see along with her . Her son, Lydia Granados, usually comes with them to translate. She has a history of atrial fibrillation with RVR discovered on 09/15/2017, when she presented to the hospital with right lower lobe pneumonia, converted back to sinus rhythm 12 hours later and was totally asymptomatic. Echocardiogram showed an EF of 55%, with moderate-to-severe LVH, with a normal stress test.  She has been anticoagulated since that time. She had a cholecystectomy in 2019. She has had pain in her right flank since that time. I had done a CT scan in 2019, which was negative for any mass at that time. She continues to complain of the right flank pain. She has never had a myocardial infarction, never had a catheterization. She was under stress last time I saw her because her father, who was 80years old, lived in [facility], was not well. He evidently  shortly thereafter. She has done well. She was having depression but having no symptoms since that time. Her son states that she overall is doing very well. CARDIAC RISK FACTORS:  Hypertension:  Positive. Other Family Members:  Positive. Peripheral Vascular Disease:  Negative. Diabetes:  Negative. Smoking:  Negative. Hyperlipidemia:  Negative. MEDICATIONS AT HOME:  She is currently on Proventil inhaler p.r.n., Tessalon 100 mg t.i.d., Premarin 0.625 mg vaginal cream, Cardizem 30 mg b.i.d., Advair 2 puffs b.i.d., Synthroid 50 mcg daily, Prilosec 20 mg daily, Xarelto 20 mg daily. PAST MEDICAL AND SURGICAL HISTORY:  1. Peptic ulcer. 2.  Euthyroid, on treatment. 3.  Hypertension. 4.  Asthma. 5.  Cholecystectomy in 03/2019. FAMILY HISTORY:  Mother had diabetes and CAD with a pacemaker. Father had diabetes. SOCIAL HISTORY:  She is 77years old, , 5 children. Her son, Roland Benítez, has his own grocery store in [facility]. Roland Benítez has a boy, 15, and a girl, 15. Mrs. Marce Means has 2 daughters, Dianne Cabrera, who is her oldest daughter, and Darryle Maes, the youngest daughter, who is an  at [address] [place] [address]. One son is an  at the [employer], another son has a home in [place] and builds [employer], and Roland Benítez has a grocery store here. She does not smoke or drink alcohol. Does not exercise but stays active. She was under much stress with her father but since he passed away, she is overall doing much better. REVIEW OF SYSTEMS:  Cardiac as above. Other systems reviewed including constitutional, eyes, ears, nose and throat, cardiovascular, respiratory, GI, , musculoskeletal, integumentary, neurologic, endocrine, hematologic and allergic/immunologic, are negative except for what is described above. No weight loss or weight gain. No change in bowel habits, no blood in stools. No fevers, sweats or chills. PHYSICAL EXAMINATION:  VITAL SIGNS:  Her blood pressure was 134/70 with a heart rate of 72 and regular. Respiratory rate 18. O2 sat was 97%. Weight 200 pounds. GENERAL:  She is a pleasant 57-year-old female. She was oriented to person, place and time. She did complain of her flank pain. It was nontender. SKIN:  No unusual skin changes. HEENT:  The pupils are equally round and intact. Mucous membranes were dry. NECK:  No JVD. Good carotid pulses. No carotid bruits. No lymphadenopathy or thyromegaly. CARDIOVASCULAR EXAM:  S1 and S2 were normal.  No S3 or S4. Soft systolic blowing type murmur. No diastolic murmur. PMI was normal.  No lift, thrust, or pericardial friction rub. LUNGS:  Quite clear to auscultation and percussion. ABDOMEN:  Soft and nontender. Good bowel sounds. EXTREMITIES:  Good femoral pulses. Good pedal pulses. No pedal edema. Skin was warm and dry. No calf tenderness. Nail beds pink. Good cap refill. PULSES:  Bilateral symmetrical radial, brachial and carotid pulses. No carotid bruits. Good femoral and pedal pulses. NEUROLOGIC EXAM:  Within normal limits. PSYCHIATRIC EXAM:  Within normal limits. LABORATORY DATA:  Her sodium was 137, potassium 4.1, BUN 15, creatinine 0.84, GFR greater than 60. Glucose was 97, calcium 10.2. Cholesterol 156, HDL 57, LDL 78, triglycerides 104. ALT was 20, AST was 15. TSH was 2.94. Vitamin D 26.5. White count 5.9, hemoglobin 15.1 with a platelet count of 525,048. Her chest x-ray was unremarkable. Her EKG showed normal sinus rhythm with incomplete right bundle-branch block, with no significant change from previous EKGs. IMPRESSION:  1. History of atrial fibrillation when she was hospitalized for pneumonia on 09/15/2017, with no known further episodes. 2.  Anticoagulated with Xarelto because of CHADS score of 3.  3.  Normal LV function and normal stress test.  4.  Continued right flank pain that has been there ever since her cholecystectomy, with a negative CT scan of the abdomen and pelvis in 2019. PLAN:  1. No change in medications. 2.  See in 1 year. DISCUSSION:  Mrs. Carl Mckeon overall is doing much better from emotional standpoint. She had been markedly depressed but that has subsided. Her son agrees that she is doing well. I made no change in medications. I will plan on seeing her in 1 year in followup. If she would have any new symptoms of chest pain, shortness of breath or loss of energy, of course, I would want to see her earlier. Thank you very much for allowing me the privilege of seeing Mrs. Carl Mckeon. If you have any questions on my thoughts, please do not hesitate to contact me.      Sincerely,        Elvin Arellano    D: 02/02/2021 10:47:36     T: 02/02/2021 10:55:18     [email or web address]  Job#: [id number]   Doc#: [id number]

## 2021-02-10 PROBLEM — K21.9 GASTROESOPHAGEAL REFLUX DISEASE: Status: ACTIVE | Noted: 2021-02-10

## 2021-02-10 PROBLEM — J45.909 ASTHMA: Status: ACTIVE | Noted: 2021-02-10

## 2021-02-10 NOTE — PATIENT INSTRUCTIONS
SURVEY:    You may be receiving a survey from [employer] regarding your visit today. Please complete the survey to enable us to provide the highest quality of care to you and your family. If you cannot score us a very good on any question, please call the office to discuss how we could of made your experience a very good one. Thank you. You may be receiving a survey from [employer] regarding your visit today. You may get this in the mail, through your MyChart or in your email. Please complete the survey to enable us to provide the highest quality of care to you and your family. If you cannot score us as very good ( 5 Stars) on any question, please feel free to call the office to discuss how we could have made your experience exceptional.     Thank You! MD Napoleon Caceres MARIELOS Ricketts    Schedule a Vaccine  When you qualify to receive the vaccine per the [address] guidelines, call the [facility]) COVID-19 Vaccination Hotline to schedule your appointment or to get additional information about the [facility]) locations which are offering the COVID-19 vaccine. To be most effective, it's important that you receive two doses of one of the COVID-19 vaccines. -If you are receiving the Acevedo Peter vaccine, your second shot will be scheduled as close to 21 days after the first shot as possible. -If you are receiving the Moderna vaccine, your second shot will be scheduled as close to 28 days after the first shot as possible. [address] Vaccination Hotline: [phone]    In partnership with [facility] and [facility], patients can call [phone], Monday-Friday 8:00am-4:00pm for scheduling at our Hospitals. Or visit the below [facility] websites for additional information of vaccine administration locations.      Links to [facility]) website and [facility] websites:    [facility]-monitoring-[email or web address]-covid-[email or web address]/[email or web address]-vaccine/[place]-[email or web address]    [email or web address]    [email or web address]

## 2021-02-11 ENCOUNTER — OFFICE VISIT (OUTPATIENT)
Dept: FAMILY MEDICINE CLINIC | Age: 66
End: 2021-02-11
Payer: MEDICARE

## 2021-02-11 VITALS
BODY MASS INDEX: 32.12 KG/M2 | TEMPERATURE: 97.2 F | HEART RATE: 72 BPM | SYSTOLIC BLOOD PRESSURE: 110 MMHG | DIASTOLIC BLOOD PRESSURE: 70 MMHG | WEIGHT: 199 LBS | OXYGEN SATURATION: 97 %

## 2021-02-11 DIAGNOSIS — M79.18 INTERCOSTAL MUSCLE PAIN: Primary | ICD-10-CM

## 2021-02-11 DIAGNOSIS — M47.818 ARTHRITIS OF RIGHT SACROILIAC JOINT: ICD-10-CM

## 2021-02-11 DIAGNOSIS — Z91.81 AT HIGH RISK FOR FALLS: ICD-10-CM

## 2021-02-11 PROCEDURE — 1090F PRES/ABSN URINE INCON ASSESS: CPT | Performed by: INTERNAL MEDICINE

## 2021-02-11 PROCEDURE — G8417 CALC BMI ABV UP PARAM F/U: HCPCS | Performed by: INTERNAL MEDICINE

## 2021-02-11 PROCEDURE — 4040F PNEUMOC VAC/ADMIN/RCVD: CPT | Performed by: INTERNAL MEDICINE

## 2021-02-11 PROCEDURE — 96372 THER/PROPH/DIAG INJ SC/IM: CPT | Performed by: INTERNAL MEDICINE

## 2021-02-11 PROCEDURE — 3017F COLORECTAL CA SCREEN DOC REV: CPT | Performed by: INTERNAL MEDICINE

## 2021-02-11 PROCEDURE — 1123F ACP DISCUSS/DSCN MKR DOCD: CPT | Performed by: INTERNAL MEDICINE

## 2021-02-11 PROCEDURE — G8399 PT W/DXA RESULTS DOCUMENT: HCPCS | Performed by: INTERNAL MEDICINE

## 2021-02-11 PROCEDURE — 99213 OFFICE O/P EST LOW 20 MIN: CPT | Performed by: INTERNAL MEDICINE

## 2021-02-11 PROCEDURE — 1036F TOBACCO NON-USER: CPT | Performed by: INTERNAL MEDICINE

## 2021-02-11 PROCEDURE — G8427 DOCREV CUR MEDS BY ELIG CLIN: HCPCS | Performed by: INTERNAL MEDICINE

## 2021-02-11 PROCEDURE — G8482 FLU IMMUNIZE ORDER/ADMIN: HCPCS | Performed by: INTERNAL MEDICINE

## 2021-02-11 RX ORDER — CITALOPRAM 10 MG/1
10 TABLET ORAL DAILY
COMMUNITY
End: 2021-09-21

## 2021-02-11 RX ORDER — TRIAMCINOLONE ACETONIDE 40 MG/ML
40 INJECTION, SUSPENSION INTRA-ARTICULAR; INTRAMUSCULAR ONCE
Status: COMPLETED | OUTPATIENT
Start: 2021-02-11 | End: 2021-02-11

## 2021-02-11 RX ADMIN — TRIAMCINOLONE ACETONIDE 40 MG: 40 INJECTION, SUSPENSION INTRA-ARTICULAR; INTRAMUSCULAR at 10:46

## 2021-02-11 ASSESSMENT — ENCOUNTER SYMPTOMS
SHORTNESS OF BREATH: 0
NAUSEA: 0
SORE THROAT: 0
COUGH: 0
ABDOMINAL PAIN: 0
BACK PAIN: 1

## 2021-02-11 NOTE — PROGRESS NOTES
HPI Notes    Name: Shakira Ibarra  : 1955         Chief Complaint:     Chief Complaint   Patient presents with    Chest Pain     Patient c/o pain in on her right side in the rib area. Pain began about 6 weeks ago.  Headache     Patient c/o having a migraine at the back of her head/neck area for the past 2-3 days       History of Present Illness:        eVda Ren is a pleasant Romanian-speaking woman who presents to office for evaluation of pain on her right side rib cage area. Also complains of pain in the right groin area  She is accompanied by her daughter-in-law who only speaks [place]. Her son helped with interpretation via phone. Patient complains of having pain on the right upper side of her rib cage for the past at least 4 to 6 weeks. She describes the pain as constant, sharp, sometimes very intense. Pain is worse when she stands up or moves around. Pain is nonradiating. She also complains of pain in the right groin area. She did not attempt to treat her pain. She is not able to take NSAIDs as she is anticoagulated with Xarelto for  A. Fib    She had a CT scan of abdomen and pelvis for evaluation of right sided pain on 2019. CT showed:    No change in multiple hepatic and renal cysts.   Cholecystectomy and hysterectomy. No unexpected pelvic mass or free fluid.   Normal appendix. No renal or ureteral calcul    She also had an x-ray of the right hip on 2019 showin. No fracture, dislocation, or significant degenerative change of the right   hip is seen.   2. There appear to be moderate degenerative changes of the right sacroiliac   joint.               Past Medical History:     Past Medical History:   Diagnosis Date    Arthritis     Asthma     Chickenpox     Hypertension     Hypothyroidism     Measles     Mumps     Osteoarthritis     Smallpox     Ulcer     Ulcer     Whooping cough       Reviewed all health maintenance requirements and orderedappropriate [employer]  Health Maintenance Due   Topic Date Due    COVID-19 Vaccine (1 of 2) 05[birth year]27/1971       Past Surgical History:     Past Surgical History:   Procedure Laterality Date    CHOLECYSTECTOMY, LAPAROSCOPIC N/A 3/4/2019    CHOLECYSTECTOMY LAPAROSCOPIC performed by Edda Ardon MD at [address] [facility]  07/20/2015    Roxanne Matt MD    HYSTERECTOMY  2006    Vaginal hysterectomy dr Vivek Reeder  07/20/2015    Roxanne Matt MD        Medications:       Prior to Admission medications    Medication Sig Start Date End Date Taking? Authorizing Provider   citalopram (CELEXA) 10 MG tablet Take 10 mg by mouth daily   Yes Historical Provider, MD   diclofenac sodium (VOLTAREN) 1 % GEL Apply topically 2 times daily 2/11/21  Yes Tabby Marquez MD   benzonatate (TESSALON) 100 MG capsule Take 100 mg by mouth 3 times daily as needed for Cough   Yes Historical Provider, MD   rivaroxaban (XARELTO) 20 MG TABS tablet take 1 tablet by mouth every morning with breakfast 1/6/21  Yes Tabby Marquez MD   conjugated estrogens (PREMARIN) 0.625 MG/GM vaginal cream Apply 2 gram intravaginally once a day at bed time x 2 wks  And then, 1 gram once a day x 7 days as needed 12/21/20  Yes Tabby Marquez MD   dilTIAZem (CARDIZEM) 30 MG tablet take 1 tablet by mouth twice a day 9/18/20  Yes Tabby Marquez MD   levothyroxine (SYNTHROID) 50 MCG tablet Take 1 tablet by mouth Daily 9/15/20  Yes Tabby Marquez MD   clobetasol (TEMOVATE) 0.05 % cream Using a thin layer daily as needed for itching.   Not to exceed 6 weeks of continuous use 6/15/20  Yes Tabby Marquez MD   albuterol (PROVENTIL) (2.5 MG/3ML) 0.083% nebulizer solution Take 3 mLs by nebulization every 6 hours as needed for Wheezing 4/13/20  Yes Tabby Marquez MD   omeprazole (PRILOSEC) 20 MG delayed release capsule take 1 capsule by mouth once daily 3/9/20  Yes Historical Provider, MD   albuterol sulfate HFA (PROVENTIL HFA) 108 (90 Base) MCG/ACT inhaler Inhale 2 puffs into the lungs every 4 hours as needed for Wheezing or Shortness of Breath 1/10/20  Yes Remington Burgos MD   fluticasone-salmeterol ([facility]) 115-21 MCG/ACT inhaler Inhale 2 puffs into the lungs 2 times daily 1/10/20  Yes Remington Burgos MD   triamcinolone (KENALOG) 0.1 % cream Apply to the affected area 2 times a day as needed for vulvar itching 12/10/19  Yes Remington Burgos MD   phenylephrine-mineral oil-petrolatum (HEMORRHOIDAL) 0.25-14-74.9 % rectal ointment Place rectally 2 times daily as needed for Hemorrhoids  Patient not taking: Reported on 2/11/2021 6/15/20   Remington Burgos MD        Allergies:       Patient has no known allergies. Social History:     Tobacco: reports that she has never smoked. She has never used smokeless tobacco.  Alcohol:      reports no history of alcohol use. Drug Use:  reports no history of drug use. Family History:     Family History   Problem Relation Age of Onset    Diabetes Father        Review of Systems:         Review of Systems   Constitutional: Negative for chills and fever. HENT: Negative for congestion and sore throat. Respiratory: Negative for cough and shortness of breath. Cardiovascular: Negative for chest pain and palpitations. Gastrointestinal: Negative for abdominal pain and nausea. Genitourinary: Negative for dysuria. Musculoskeletal: Positive for arthralgias and back pain. Skin: Negative for rash. Neurological: Negative for dizziness and headaches. Psychiatric/Behavioral: The patient is not nervous/anxious. Physical Exam:     Vitals:  /70 (Site: Right Upper Arm, Position: Sitting, Cuff Size: Medium Adult)   Pulse 72   Temp 97.2 °F (36.2 °C)   Wt 199 lb (90.3 kg)   SpO2 97%   BMI 32.12 kg/m²       Physical Exam  Vitals signs reviewed. Constitutional:       General: She is not in acute distress. Appearance: She is well-developed. HENT:      Head: Normocephalic and atraumatic.    Neck:      Thyroid: No thyromegaly. Cardiovascular:      Rate and Rhythm: Normal rate and regular rhythm. Heart sounds: Normal heart sounds. No murmur. Pulmonary:      Effort: Pulmonary effort is normal.      Breath sounds: Normal breath sounds. No wheezing or rales. Abdominal:      General: Bowel sounds are normal. There is no distension. Palpations: Abdomen is soft. There is no mass. Tenderness: There is no abdominal tenderness. Musculoskeletal: Normal range of motion. General: Tenderness (right upper back area tender to palpation ) present. Lymphadenopathy:      Cervical: No cervical adenopathy. Skin:     General: Skin is warm and dry. Findings: No rash. Neurological:      Mental Status: She is alert and oriented to person, place, and time. Psychiatric:         Behavior: Behavior normal.         Judgment: Judgment normal.               Data:     Lab Results   Component Value Date     01/30/2021    K 4.1 01/30/2021     01/30/2021    CO2 29 01/30/2021    BUN 15 01/30/2021    CREATININE 0.84 01/30/2021    GLUCOSE 97 01/30/2021    PROT 8.5 01/30/2021    LABALBU 4.2 01/30/2021    BILITOT 0.56 01/30/2021    ALKPHOS 117 01/30/2021    AST 15 01/30/2021    ALT 20 01/30/2021     Lab Results   Component Value Date    WBC 5.9 01/30/2021    RBC 5.28 01/30/2021    HGB 15.1 01/30/2021    HCT 45.6 01/30/2021    MCV 86.4 01/30/2021    MCH 28.7 01/30/2021    MCHC 33.2 01/30/2021    RDW 14.3 01/30/2021     01/30/2021    MPV NOT REPORTED 01/30/2021     Lab Results   Component Value Date    TSH 2.94 01/30/2021     Lab Results   Component Value Date    CHOL 156 01/30/2021    HDL 57 01/30/2021    LABA1C 5.6 07/08/2019          Assessment & Plan        Diagnosis Orders   1. Intercostal muscle pain   Received Kenalog IM injection in office, does not tolerate oral steroids. I believe her pain is musculoskeletal, it reproducible on physical examination.  Will also try Voltaren gel , Lidocaine back patches, heating pad.  triamcinolone acetonide (KENALOG-40) injection 40 mg    diclofenac sodium (VOLTAREN) 1 % GEL   2. Arthritis of right sacroiliac joint   She is unable to take NSAIDs as she is on Xarelto. Recommended orthopedic evaluation. she wants to think about it. 3. At high risk for falls                     Completed Refills   Requested Prescriptions     Signed Prescriptions Disp Refills    diclofenac sodium (VOLTAREN) 1 %  g 0     Sig: Apply topically 2 times daily     No follow-ups on file. Orders Placed This Encounter   Medications    triamcinolone acetonide (KENALOG-40) injection 40 mg    diclofenac sodium (VOLTAREN) 1 % GEL     Sig: Apply topically 2 times daily     Dispense:  150 g     Refill:  0     No orders of the defined types were placed in this encounter. Patient Instructions   SURVEY:    You may be receiving a survey from [employer] regarding your visit today. Please complete the survey to enable us to provide the highest quality of care to you and your family. If you cannot score us a very good on any question, please call the office to discuss how we could of made your experience a very good one. Thank you. You may be receiving a survey from [employer] regarding your visit today. You may get this in the mail, through your MyChart or in your email. Please complete the survey to enable us to provide the highest quality of care to you and your family. If you cannot score us as very good ( 5 Stars) on any question, please feel free to call the office to discuss how we could have made your experience exceptional.     Thank You!       MD [employer] MARIELOS Brown    Schedule a Vaccine  When you qualify to receive the vaccine per the [address] guidelines, call the [facility]) COVID-19 Vaccination Hotline to schedule your appointment or to get additional information about the [facility]) locations which are offering the COVID-19 vaccine. To be most effective, it's important that you receive two doses of one of the COVID-19 vaccines. -If you are receiving the Acevedo Peter vaccine, your second shot will be scheduled as close to 21 days after the first shot as possible. -If you are receiving the Moderna vaccine, your second shot will be scheduled as close to 28 days after the first shot as possible. [address] Vaccination Hotline: [phone]    In partnership with [facility] and [facility] HEALTH Departments, patients can call [phone], Monday-Friday 8:00am-4:00pm for scheduling at our Hospitals. Or visit the [facility] websites for additional information of vaccine administration locations. Links to [facility]) website and Health Department websites:    [employer]-monitoring-coronavirus-covid-19/covid-19-vaccine/[place]-vaccine    [email or web address]    [email or web address]        Electronically signed by Kajal Magdaleno MD on 2/11/2021 at 8:33 PM           Completed Refills      Requested Prescriptions     Signed Prescriptions Disp Refills    diclofenac sodium (VOLTAREN) 1 %  g 0     Sig: Apply topically 2 times daily           On the basis of positive falls risk screening, assessment and plan is as follows: home safety tips provided, patient declines any further evaluation/treatment for increased falls risk, reports accidental fall, otherwise feels steady.

## 2021-03-05 DIAGNOSIS — E03.9 ACQUIRED HYPOTHYROIDISM: ICD-10-CM

## 2021-03-05 RX ORDER — LEVOTHYROXINE SODIUM 0.05 MG/1
TABLET ORAL
Qty: 90 TABLET | Refills: 1 | Status: SHIPPED | OUTPATIENT
Start: 2021-03-05 | End: 2021-09-03

## 2021-03-05 NOTE — TELEPHONE ENCOUNTER
Refill medication  Last OV 2/11/2021  Last date RX filled 9/15/20  Next scheduled appt 6/21/2021  Medication pended    Health Maintenance   Topic Date Due    COVID-19 Vaccine (1 of 2) Never done    DTaP/Tdap/Td vaccine (1 - Tdap) 06/15/2021 (Originally [birth year]27/1972)    Shingles Vaccine (1 of 2) 06/15/2021 (Originally 5/27/2003)    Pneumococcal 65+ years Vaccine (2 of 2 - PPSV23) 06/15/2021 (Originally 11/20/2019)    Annual Wellness Visit (AWV)  08/04/2021    TSH testing  01/30/2022    Breast cancer screen  02/26/2022    Colon cancer screen colonoscopy  07/20/2025    Lipid screen  01/30/2026    DEXA (modify frequency per FRAX score)  Completed    Flu vaccine  Completed    Hepatitis C screen  Addressed    Hepatitis A vaccine  Aged Out    Hepatitis B vaccine  Aged Out    Hib vaccine  Aged Out    Meningococcal (ACWY) vaccine  Aged Out             (applicable per patient's age: Cancer Screenings, Depression Screening, Fall Risk Screening, Immunizations)    Hemoglobin A1C (%)   Date Value   07/08/2019 5.6   03/26/2018 5.9   09/25/2017 6.0     LDL Cholesterol (mg/dL)   Date Value   01/30/2021 78     AST (U/L)   Date Value   01/30/2021 15     ALT (U/L)   Date Value   01/30/2021 20     BUN (mg/dL)   Date Value   01/30/2021 15      (goal A1C is < 7)   (goal LDL is <100) need 30-50% reduction from baseline     BP Readings from Last 3 Encounters:   02/11/21 110/70   02/02/21 134/70   12/21/20 118/73    (goal /80)      All Future Testing planned in [facility]:  Lab Frequency Next Occurrence   TSH with Reflex Once 02/02/2022   CBC Auto Differential Once 02/02/2022   Vitamin D 25 Hydroxy Once 02/02/2022   Comprehensive Metabolic Panel Once 95/76/7292   Lipid Panel Once 02/02/2022   Magnesium Once 02/02/2022   EKG 12 Lead Once 02/02/2022   XR CHEST (2 VW) Once 02/02/2022       Next Visit Date:  Future Appointments   Date Time Provider Alexey Mosqueda   6/21/2021 10:30 AM MD Melani Nunez [facility] [facility] 1/27/2022 10:00 AM Coni Duverney, MD Levonia Laws [facility]            Patient Active Problem List:     Hypothyroidism     Hx of gastroesophageal reflux (GERD)     Hypertension     Pneumonia of right lower lobe due to infectious organism     Paroxysmal atrial fibrillation (HCC)     Dysthymia     Gastroesophageal reflux disease     Asthma

## 2021-03-17 DIAGNOSIS — I48.0 PAROXYSMAL ATRIAL FIBRILLATION (HCC): ICD-10-CM

## 2021-03-17 NOTE — TELEPHONE ENCOUNTER
Refill request  Last OV 2/11/2021  Last date RX filled 9/18/20  Next scheduled appt 6/21/2021  Medication pended    Health Maintenance   Topic Date Due    COVID-19 Vaccine (1) Never done    DTaP/Tdap/Td vaccine (1 - Tdap) 06/15/2021 (Originally 5/27/1974)    Shingles Vaccine (1 of 2) 06/15/2021 (Originally 5/27/2003)    Pneumococcal 65+ years Vaccine (2 of 2 - PPSV23) 06/15/2021 (Originally 11/20/2019)    Annual Wellness Visit (AWV)  08/04/2021    TSH testing  01/30/2022    Breast cancer screen  02/26/2022    Colon cancer screen colonoscopy  07/20/2025    Lipid screen  01/30/2026    DEXA (modify frequency per FRAX score)  Completed    Flu vaccine  Completed    Hepatitis C screen  Addressed    Hepatitis A vaccine  Aged Out    Hepatitis B vaccine  Aged Out    Hib vaccine  Aged Out    Meningococcal (ACWY) vaccine  Aged Out             (applicable per patient's age: Cancer Screenings, Depression Screening, Fall Risk Screening, Immunizations)    Hemoglobin A1C (%)   Date Value   07/08/2019 5.6   03/26/2018 5.9   09/25/2017 6.0     LDL Cholesterol (mg/dL)   Date Value   01/30/2021 78     AST (U/L)   Date Value   01/30/2021 15     ALT (U/L)   Date Value   01/30/2021 20     BUN (mg/dL)   Date Value   01/30/2021 15      (goal A1C is < 7)   (goal LDL is <100) need 30-50% reduction from baseline     BP Readings from Last 3 Encounters:   02/11/21 110/70   02/02/21 134/70   12/21/20 118/73    (goal /80)      All Future Testing planned in [facility]:  Lab Frequency Next Occurrence   TSH with Reflex Once 02/02/2022   CBC Auto Differential Once 02/02/2022   Vitamin D 25 Hydroxy Once 02/02/2022   Comprehensive Metabolic Panel Once 06/75/1614   Lipid Panel Once 02/02/2022   Magnesium Once 02/02/2022   EKG 12 Lead Once 02/02/2022   XR CHEST (2 VW) Once 02/02/2022       Next Visit Date:  Future Appointments   Date Time Provider Alexey Mosqueda   6/21/2021 10:30 AM Remington Burgos MD [facility]   1/27/2022 10:00 AM MD Mandy Guardado [facility]            Patient Active Problem List:     Hypothyroidism     Hx of gastroesophageal reflux (GERD)     Hypertension     Pneumonia of right lower lobe due to infectious organism     Paroxysmal atrial fibrillation (HCC)     Dysthymia     Gastroesophageal reflux disease     Asthma

## 2021-03-22 DIAGNOSIS — M79.18 INTERCOSTAL MUSCLE PAIN: ICD-10-CM

## 2021-04-19 ENCOUNTER — TELEPHONE (OUTPATIENT)
Dept: FAMILY MEDICINE CLINIC | Age: 66
End: 2021-04-19

## 2021-04-19 DIAGNOSIS — Z12.31 ENCOUNTER FOR SCREENING MAMMOGRAM FOR BREAST CANCER: Primary | ICD-10-CM

## 2021-04-19 NOTE — TELEPHONE ENCOUNTER
Mammogram order pending     Health Maintenance   Topic Date Due    COVID-19 Vaccine (1) Never done    DTaP/Tdap/Td vaccine (1 - Tdap) 06/15/2021 (Originally [birth year]27/1974)    Shingles Vaccine (1 of 2) 06/15/2021 (Originally 5/27/2003)    Pneumococcal 65+ years Vaccine (2 of 2 - PPSV23) 06/15/2021 (Originally 11/20/2019)    Annual Wellness Visit (AWV)  08/04/2021    TSH testing  01/30/2022    Breast cancer screen  02/26/2022    Colon cancer screen colonoscopy  07/20/2025    Lipid screen  01/30/2026    DEXA (modify frequency per FRAX score)  Completed    Flu vaccine  Completed    Hepatitis C screen  Addressed    Hepatitis A vaccine  Aged Out    Hepatitis B vaccine  Aged Out    Hib vaccine  Aged Out    Meningococcal (ACWY) vaccine  Aged Out             (applicable per patient's age: Cancer Screenings, Depression Screening, Fall Risk Screening, Immunizations)    Hemoglobin A1C (%)   Date Value   07/08/2019 5.6   03/26/2018 5.9   09/25/2017 6.0     LDL Cholesterol (mg/dL)   Date Value   01/30/2021 78     AST (U/L)   Date Value   01/30/2021 15     ALT (U/L)   Date Value   01/30/2021 20     BUN (mg/dL)   Date Value   01/30/2021 15      (goal A1C is < 7)   (goal LDL is <100) need 30-50% reduction from baseline     BP Readings from Last 3 Encounters:   02/11/21 110/70   02/02/21 134/70   12/21/20 118/73    (goal /80)      All Future Testing planned in [facility]:  Lab Frequency Next Occurrence   TSH with Reflex Once 02/02/2022   CBC Auto Differential Once 02/02/2022   Vitamin D 25 Hydroxy Once 02/02/2022   Comprehensive Metabolic Panel Once 61/06/9160   Lipid Panel Once 02/02/2022   Magnesium Once 02/02/2022   EKG 12 Lead Once 02/02/2022   XR CHEST (2 VW) Once 02/02/2022       Next Visit Date:  Future Appointments   Date Time Provider Alexey Vidalisti   6[facility]21[facility]2021 10:30 [facility]   1/27/2022 10:00 AM Cyndee Pruitt MD [facility]            Patient Active Problem List:

## 2021-05-05 ENCOUNTER — HOSPITAL ENCOUNTER (OUTPATIENT)
Dept: MAMMOGRAPHY | Age: 66
Discharge: HOME | End: 2021-05-07
Payer: MEDICARE

## 2021-05-05 DIAGNOSIS — Z12.31 ENCOUNTER FOR SCREENING MAMMOGRAM FOR BREAST CANCER: ICD-10-CM

## 2021-05-05 PROCEDURE — 77063 BREAST TOMOSYNTHESIS BI: CPT

## 2021-05-07 ENCOUNTER — TELEPHONE (OUTPATIENT)
Dept: FAMILY MEDICINE CLINIC | Age: 66
End: 2021-05-07

## 2021-06-21 ENCOUNTER — OFFICE VISIT (OUTPATIENT)
Dept: FAMILY MEDICINE CLINIC | Age: 66
End: 2021-06-21
Payer: MEDICARE

## 2021-06-21 VITALS
OXYGEN SATURATION: 96 % | WEIGHT: 196.2 LBS | BODY MASS INDEX: 31.67 KG/M2 | DIASTOLIC BLOOD PRESSURE: 70 MMHG | TEMPERATURE: 97.3 F | SYSTOLIC BLOOD PRESSURE: 112 MMHG | HEART RATE: 70 BPM

## 2021-06-21 DIAGNOSIS — I10 ESSENTIAL HYPERTENSION: Primary | ICD-10-CM

## 2021-06-21 DIAGNOSIS — E03.9 HYPOTHYROIDISM, UNSPECIFIED TYPE: ICD-10-CM

## 2021-06-21 DIAGNOSIS — I48.0 PAROXYSMAL ATRIAL FIBRILLATION (HCC): ICD-10-CM

## 2021-06-21 DIAGNOSIS — K64.9 HEMORRHOIDS, UNSPECIFIED HEMORRHOID TYPE: ICD-10-CM

## 2021-06-21 DIAGNOSIS — F32.1 CURRENT MODERATE EPISODE OF MAJOR DEPRESSIVE DISORDER, UNSPECIFIED WHETHER RECURRENT (HCC): ICD-10-CM

## 2021-06-21 DIAGNOSIS — Z87.19 HX OF GASTROESOPHAGEAL REFLUX (GERD): ICD-10-CM

## 2021-06-21 DIAGNOSIS — J45.20 INTERMITTENT ASTHMA WITHOUT COMPLICATION, UNSPECIFIED ASTHMA SEVERITY: ICD-10-CM

## 2021-06-21 PROCEDURE — 99214 OFFICE O/P EST MOD 30 MIN: CPT | Performed by: INTERNAL MEDICINE

## 2021-06-21 PROCEDURE — 1036F TOBACCO NON-USER: CPT | Performed by: INTERNAL MEDICINE

## 2021-06-21 PROCEDURE — 4040F PNEUMOC VAC/ADMIN/RCVD: CPT | Performed by: INTERNAL MEDICINE

## 2021-06-21 PROCEDURE — G8399 PT W/DXA RESULTS DOCUMENT: HCPCS | Performed by: INTERNAL MEDICINE

## 2021-06-21 PROCEDURE — G8417 CALC BMI ABV UP PARAM F/U: HCPCS | Performed by: INTERNAL MEDICINE

## 2021-06-21 PROCEDURE — 1090F PRES/ABSN URINE INCON ASSESS: CPT | Performed by: INTERNAL MEDICINE

## 2021-06-21 PROCEDURE — 3017F COLORECTAL CA SCREEN DOC REV: CPT | Performed by: INTERNAL MEDICINE

## 2021-06-21 PROCEDURE — 1123F ACP DISCUSS/DSCN MKR DOCD: CPT | Performed by: INTERNAL MEDICINE

## 2021-06-21 PROCEDURE — G8427 DOCREV CUR MEDS BY ELIG CLIN: HCPCS | Performed by: INTERNAL MEDICINE

## 2021-06-21 RX ORDER — HYDROCORTISONE ACETATE 25 MG/1
25 SUPPOSITORY RECTAL EVERY 12 HOURS
Qty: 10 SUPPOSITORY | Refills: 1 | Status: SHIPPED | OUTPATIENT
Start: 2021-06-21

## 2021-06-21 RX ORDER — DOCUSATE SODIUM 100 MG/1
100 CAPSULE, LIQUID FILLED ORAL DAILY
Qty: 30 CAPSULE | Refills: 3 | Status: SHIPPED | OUTPATIENT
Start: 2021-06-21 | End: 2021-07-21

## 2021-06-21 RX ORDER — HYDROCORTISONE 25 MG/G
CREAM TOPICAL 2 TIMES DAILY
Qty: 1 TUBE | Refills: 1 | Status: SHIPPED | OUTPATIENT
Start: 2021-06-21 | End: 2022-08-25 | Stop reason: SDUPTHER

## 2021-06-21 SDOH — ECONOMIC STABILITY: FOOD INSECURITY: WITHIN THE PAST 12 MONTHS, THE FOOD YOU BOUGHT JUST DIDN'T LAST AND YOU DIDN'T HAVE MONEY TO GET MORE.: NEVER TRUE

## 2021-06-21 SDOH — ECONOMIC STABILITY: FOOD INSECURITY: WITHIN THE PAST 12 MONTHS, YOU WORRIED THAT YOUR FOOD WOULD RUN OUT BEFORE YOU GOT MONEY TO BUY MORE.: NEVER TRUE

## 2021-06-21 ASSESSMENT — ENCOUNTER SYMPTOMS
ABDOMINAL PAIN: 0
HEARTBURN: 1
SORE THROAT: 0
NAUSEA: 0
SHORTNESS OF BREATH: 0
CONSTIPATION: 1
COUGH: 0

## 2021-06-21 ASSESSMENT — SOCIAL DETERMINANTS OF HEALTH (SDOH): HOW HARD IS IT FOR YOU TO PAY FOR THE VERY BASICS LIKE FOOD, HOUSING, MEDICAL CARE, AND HEATING?: NOT VERY HARD

## 2021-06-21 ASSESSMENT — PATIENT HEALTH QUESTIONNAIRE - PHQ9
SUM OF ALL RESPONSES TO PHQ9 QUESTIONS 1 & 2: 0
SUM OF ALL RESPONSES TO PHQ QUESTIONS 1-9: 0
SUM OF ALL RESPONSES TO PHQ QUESTIONS 1-9: 0
1. LITTLE INTEREST OR PLEASURE IN DOING THINGS: 0
2. FEELING DOWN, DEPRESSED OR HOPELESS: 0
SUM OF ALL RESPONSES TO PHQ QUESTIONS 1-9: 0

## 2021-06-21 NOTE — PATIENT INSTRUCTIONS
SURVEY:    You may be receiving a survey from [employer] regarding your visit today. Please complete the survey to enable us to provide the highest quality of care to you and your family. If you cannot score us a very good on any question, please call the office to discuss how we could of made your experience a very good one. Thank you. You may be receiving a survey from [employer] regarding your visit today. You may get this in the mail, through your MyChart or in your email. Please complete the survey to enable us to provide the highest quality of care to you and your family. If you cannot score us as very good ( 5 Stars) on any question, please feel free to call the office to discuss how we could have made your experience exceptional.     Thank You!       MD Sun Mendoza

## 2021-06-21 NOTE — PROGRESS NOTES
nausea or no sore throat. This is a chronic problem. The current episode started more than 1 year ago. The problem occurs occasionally. The symptoms are aggravated by certain foods (spicy food). Pertinent negatives include no fatigue, melena or weight loss. Risk factors include obesity. She has tried a PPI for the symptoms. The treatment provided moderate relief. Past procedures include an EGD. Past Medical History:     Past Medical History:   Diagnosis Date    Arthritis     Asthma     Chickenpox     Hypertension     Hypothyroidism     Measles     Mumps     Osteoarthritis     Smallpox     Ulcer     Ulcer     Whooping cough       Reviewed all health maintenance requirements and orderedappropriate tests  Health Maintenance Due   Topic Date Due    COVID-19 Vaccine (1) Never done    DTaP/Tdap/Td vaccine (1 - Tdap) Never done    Shingles Vaccine (1 of 2) Never done    Pneumococcal 65+ years Vaccine (2 of 2 - PPSV23) 11/20/2019       Past Surgical History:     Past Surgical History:   Procedure Laterality Date    CHOLECYSTECTOMY, LAPAROSCOPIC N/A 3/4/2019    CHOLECYSTECTOMY LAPAROSCOPIC performed by Curtis Wilkinson MD at [address] [place] [address] COLONOSCOPY  07/20/2015    Lucas Marx MD    HYSTERECTOMY  2006    Vaginal hysterectomy dr Kale Car  07/20/2015    Lucas Marx MD        Medications:       Prior to Admission medications    Medication Sig Start Date End Date Taking?  Authorizing Provider   hydrocortisone (ANUSOL-HC) 25 MG suppository Place 1 suppository rectally every 12 hours 6/21/21  Yes Dutch Hagen MD   hydrocortisone (ANUSOL-HC) 2.5 % CREA rectal cream Place rectally 2 times daily 6/21/21  Yes Dutch Hagen MD   docusate sodium (COLACE) 100 MG capsule Take 1 capsule by mouth daily 6/21/21 7/21/21 Yes Dutch Hagen MD   diclofenac sodium (VOLTAREN) 1 % GEL Apply topically 2 times daily 3/22/21  Yes Dutch Hagen MD   dilTIAZem (CARDIZEM) 30 MG tablet take 1 tablet by mouth twice a day 3/17/21  Yes Melo Venegas MD   levothyroxine (SYNTHROID) 50 MCG tablet take 1 tablet by mouth once daily 3/5/21  Yes Melo Venegas MD   rivaroxaban (XARELTO) 20 MG TABS tablet take 1 tablet by mouth every morning with breakfast 1/6/21  Yes Melo Venegas MD   conjugated estrogens (PREMARIN) 0.625 MG/GM vaginal cream Apply 2 gram intravaginally once a day at bed time x 2 wks  And then, 1 gram once a day x 7 days as needed 12/21/20  Yes Melo Venegas MD   clobetasol (TEMOVATE) 0.05 % cream Using a thin layer daily as needed for itching. Not to exceed 6 weeks of continuous use 6/15/20  Yes Melo Venegas MD   albuterol (PROVENTIL) (2.5 MG/3ML) 0.083% nebulizer solution Take 3 mLs by nebulization every 6 hours as needed for Wheezing 4/13/20  Yes Melo Venegas MD   omeprazole (PRILOSEC) 20 MG delayed release capsule take 1 capsule by mouth once daily 3/9/20  Yes Historical Provider, MD   triamcinolone (KENALOG) 0.1 % cream Apply to the affected area 2 times a day as needed for vulvar itching 12/10/19  Yes Melo Venegas MD   citalopram (CELEXA) 10 MG tablet Take 10 mg by mouth daily  Patient not taking: Reported on 6/21/2021    Historical Provider, MD   phenylephrine-mineral oil-petrolatum (HEMORRHOIDAL) 0.25-14-74.9 % rectal ointment Place rectally 2 times daily as needed for Hemorrhoids  Patient not taking: Reported on 2/11/2021 6/15/20   Melo Venegas MD   albuterol sulfate HFA (PROVENTIL HFA) 108 (90 Base) MCG/ACT inhaler Inhale 2 puffs into the lungs every 4 hours as needed for Wheezing or Shortness of Breath  Patient not taking: Reported on 6/21/2021 1/10/20   Melo Venegas MD   fluticasone-salmeterol (ADVAIR HFA) 556-37 MCG/ACT inhaler Inhale 2 puffs into the lungs 2 times daily  Patient not taking: Reported on 6/21/2021 1/10/20   Melo Venegas MD        Allergies:       Patient has no known allergies.     Social History:     Tobacco: reports that she has never smoked. She has never used smokeless tobacco.  Alcohol:      reports no history of alcohol use. Drug Use:  reports no history of drug use. Family History:     Family History   Problem Relation Age of Onset    Diabetes Father        Review of Systems:         Review of Systems   Constitutional: Negative for activity change, appetite change, chills, fatigue, fever and weight loss. HENT: Negative for congestion and sore throat. Respiratory: Negative for cough and shortness of breath. Cardiovascular: Negative for chest pain and palpitations. Gastrointestinal: Positive for constipation and heartburn. Negative for abdominal pain, melena and nausea. Hemorrhoids   Genitourinary: Negative for dysuria. Skin: Negative for rash. Neurological: Negative for dizziness and headaches. Psychiatric/Behavioral: Negative for dysphoric mood, self-injury, sleep disturbance and suicidal ideas. The patient is not nervous/anxious. Physical Exam:     Vitals:  /70   Pulse 70   Temp 97.3 °F (36.3 °C)   Wt 196 lb 3.2 oz (89 kg)   SpO2 96%   BMI 31.67 kg/m²       Physical Exam  Vitals reviewed. Constitutional:       General: She is not in acute distress. Appearance: She is well-developed. HENT:      Head: Normocephalic and atraumatic. Mouth/Throat:      Mouth: Mucous membranes are moist.   Eyes:      General: No scleral icterus. Right eye: No discharge. Left eye: No discharge. Conjunctiva/sclera: Conjunctivae normal.   Neck:      Thyroid: No thyromegaly. Cardiovascular:      Rate and Rhythm: Normal rate and regular rhythm. Heart sounds: Normal heart sounds. No murmur heard. Pulmonary:      Effort: Pulmonary effort is normal.      Breath sounds: Normal breath sounds. No wheezing or rales. Abdominal:      General: Bowel sounds are normal. There is no distension. Palpations: Abdomen is soft. There is no mass. Tenderness:  There is no months  Aerosol Mask 1 per month  Replacement Filters 2 per month  All other related supplies as needed per month    Frequency: Three times daily    Diagnosis: asthma    Length of Need: 12 months     Order Specific Question:   Medications being used: Answer: Albuterol . 083 unit dose vial         Patient Instructions   SURVEY:    You may be receiving a survey from [employer] regarding your visit today. Please complete the survey to enable us to provide the highest quality of care to you and your family. If you cannot score us a very good on any question, please call the office to discuss how we could of made your experience a very good one. Thank you. You may be receiving a survey from [employer] regarding your visit today. You may get this in the mail, through your MyChart or in your email. Please complete the survey to enable us to provide the highest quality of care to you and your family. If you cannot score us as very good ( 5 Stars) on any question, please feel free to call the office to discuss how we could have made your experience exceptional.     Thank You!       Jammie Toney MD  [facility]        Electronically signed by Jammie Toney MD on 6/21/2021 at 1:40 PM           Completed Refills      Requested Prescriptions     Signed Prescriptions Disp Refills    hydrocortisone (ANUSOL-HC) 25 MG suppository 10 suppository 1     Sig: Place 1 suppository rectally every 12 hours    hydrocortisone (ANUSOL-HC) 2.5 % CREA rectal cream 1 Tube 1     Sig: Place rectally 2 times daily    docusate sodium (COLACE) 100 MG capsule 30 capsule 3     Sig: Take 1 capsule by mouth daily

## 2021-07-07 ENCOUNTER — TELEPHONE (OUTPATIENT)
Dept: FAMILY MEDICINE CLINIC | Age: 66
End: 2021-07-07

## 2021-07-07 NOTE — TELEPHONE ENCOUNTER
Patient's daughter Lawence Moh called requesting a different rx brand of suppository, due to the price of Anusol. Patient is aware, Dr Romina Costa is off today. JORJE Hanks. Please advise.  Thank you

## 2021-07-08 NOTE — TELEPHONE ENCOUNTER
Please ask the patient to check OTC hemorrhoidal suppos[facility] at [facility]    No prescription is needed  Thanks

## 2021-07-21 ENCOUNTER — TELEPHONE (OUTPATIENT)
Dept: FAMILY MEDICINE CLINIC | Age: 66
End: 2021-07-21

## 2021-07-21 NOTE — TELEPHONE ENCOUNTER
I called and spoke to patient's daughter Cirilo Damon and advised that it was probably better for her to go to ER in order to get an x-ray of the wrist versus an office visit. Please advise, thank you.

## 2021-09-03 DIAGNOSIS — E03.9 ACQUIRED HYPOTHYROIDISM: ICD-10-CM

## 2021-09-03 RX ORDER — LEVOTHYROXINE SODIUM 0.05 MG/1
TABLET ORAL
Qty: 90 TABLET | Refills: 1 | Status: SHIPPED | OUTPATIENT
Start: 2021-09-03 | End: 2021-09-09 | Stop reason: SDUPTHER

## 2021-09-05 DIAGNOSIS — I48.0 PAROXYSMAL ATRIAL FIBRILLATION (HCC): ICD-10-CM

## 2021-09-07 DIAGNOSIS — E03.9 ACQUIRED HYPOTHYROIDISM: ICD-10-CM

## 2021-09-07 RX ORDER — LEVOTHYROXINE SODIUM 0.05 MG/1
TABLET ORAL
Qty: 90 TABLET | Refills: 1 | OUTPATIENT
Start: 2021-09-07

## 2021-09-07 NOTE — TELEPHONE ENCOUNTER
Refill request  Last OV 6/21/2021  Last date RX written 3/17/21  Next scheduled appt 9/21/2021  Medication pended    Health Maintenance   Topic Date Due    COVID-19 Vaccine (1) Never done    DTaP/Tdap/Td vaccine (1 - Tdap) Never done    Shingles Vaccine (1 of 2) Never done    Annual Wellness Visit (AWV)  Never done    Pneumococcal 65+ years Vaccine (2 of 2 - PPSV23) 11/20/2019    Flu vaccine (1) 09/01/2021    TSH testing  01/30/2022    Breast cancer screen  05/05/2023    Colon cancer screen colonoscopy  07/20/2025    Lipid screen  01/30/2026    DEXA (modify frequency per FRAX score)  Completed    Hepatitis C screen  Addressed    Hepatitis A vaccine  Aged Out    Hepatitis B vaccine  Aged Out    Hib vaccine  Aged Out    Meningococcal (ACWY) vaccine  Aged Out             (applicable per patient's age: Cancer Screenings, Depression Screening, Fall Risk Screening, Immunizations)    Hemoglobin A1C (%)   Date Value   07/08/2019 5.6   03/26/2018 5.9   09/25/2017 6.0     LDL Cholesterol (mg/dL)   Date Value   01/30/2021 78     AST (U/L)   Date Value   01/30/2021 15     ALT (U/L)   Date Value   01/30/2021 20     BUN (mg/dL)   Date Value   01/30/2021 15      (goal A1C is < 7)   (goal LDL is <100) need 30-50% reduction from baseline     BP Readings from Last 3 Encounters:   06/21/21 112/70   02/11/21 110/70   02/02/21 134/70    (goal /80)      All Future Testing planned in [facility]:  Lab Frequency Next Occurrence   TSH with Reflex Once 02/02/2022   CBC Auto Differential Once 02/02/2022   Vitamin D 25 Hydroxy Once 02/02/2022   Comprehensive Metabolic Panel Once 73/13/5662   Lipid Panel Once 02/02/2022   Magnesium Once 02/02/2022   EKG 12 Lead Once 02/02/2022   XR CHEST (2 VW) Once 02/02/2022       Next Visit Date:  Future Appointments   Date Time Provider Alexey Mikei   9/21/2021  9:30 AM Bessie Elder MD [facility]   1/27/2022 10:00 AM [facility]            Patient Active Problem List:     Hypothyroidism     Hx of gastroesophageal reflux (GERD)     Hypertension     Pneumonia of right lower lobe due to infectious organism     Paroxysmal atrial fibrillation (HCC)     Dysthymia     Gastroesophageal reflux disease     Asthma     Current moderate episode of major depressive disorder, unspecified whether recurrent ([facility] 75.)

## 2021-09-09 DIAGNOSIS — E03.9 ACQUIRED HYPOTHYROIDISM: ICD-10-CM

## 2021-09-09 RX ORDER — LEVOTHYROXINE SODIUM 0.05 MG/1
TABLET ORAL
Qty: 90 TABLET | Refills: 1 | Status: SHIPPED | OUTPATIENT
Start: 2021-09-09 | End: 2021-12-14 | Stop reason: SDUPTHER

## 2021-09-10 DIAGNOSIS — I48.91 ATRIAL FIBRILLATION, UNSPECIFIED TYPE (HCC): ICD-10-CM

## 2021-09-21 ENCOUNTER — HOSPITAL ENCOUNTER (OUTPATIENT)
Age: 66
Setting detail: SPECIMEN
Discharge: HOME | End: 2021-09-21
Payer: MEDICARE

## 2021-09-21 ENCOUNTER — OFFICE VISIT (OUTPATIENT)
Dept: FAMILY MEDICINE CLINIC | Age: 66
End: 2021-09-21
Payer: MEDICARE

## 2021-09-21 ENCOUNTER — OFFICE VISIT (OUTPATIENT)
Dept: OBGYN CLINIC | Age: 66
End: 2021-09-21
Payer: MEDICARE

## 2021-09-21 VITALS
HEART RATE: 70 BPM | BODY MASS INDEX: 31.34 KG/M2 | OXYGEN SATURATION: 97 % | TEMPERATURE: 97 F | SYSTOLIC BLOOD PRESSURE: 120 MMHG | DIASTOLIC BLOOD PRESSURE: 86 MMHG | WEIGHT: 194.2 LBS

## 2021-09-21 VITALS
DIASTOLIC BLOOD PRESSURE: 86 MMHG | SYSTOLIC BLOOD PRESSURE: 120 MMHG | BODY MASS INDEX: 31.18 KG/M2 | WEIGHT: 194 LBS | HEIGHT: 66 IN

## 2021-09-21 DIAGNOSIS — N89.8 VAGINAL ITCHING: Primary | ICD-10-CM

## 2021-09-21 DIAGNOSIS — I48.91 ATRIAL FIBRILLATION, UNSPECIFIED TYPE (HCC): ICD-10-CM

## 2021-09-21 DIAGNOSIS — N89.8 VAGINAL ITCHING: ICD-10-CM

## 2021-09-21 DIAGNOSIS — N64.4 BREAST PAIN, LEFT: ICD-10-CM

## 2021-09-21 DIAGNOSIS — K21.9 GASTROESOPHAGEAL REFLUX DISEASE WITHOUT ESOPHAGITIS: ICD-10-CM

## 2021-09-21 DIAGNOSIS — N90.4 VULVAR DYSTROPHY: ICD-10-CM

## 2021-09-21 DIAGNOSIS — J45.909 UNCOMPLICATED ASTHMA, UNSPECIFIED ASTHMA SEVERITY, UNSPECIFIED WHETHER PERSISTENT: ICD-10-CM

## 2021-09-21 DIAGNOSIS — I10 ESSENTIAL HYPERTENSION: Primary | ICD-10-CM

## 2021-09-21 PROCEDURE — G8417 CALC BMI ABV UP PARAM F/U: HCPCS | Performed by: OBSTETRICS & GYNECOLOGY

## 2021-09-21 PROCEDURE — 4040F PNEUMOC VAC/ADMIN/RCVD: CPT | Performed by: INTERNAL MEDICINE

## 2021-09-21 PROCEDURE — G8427 DOCREV CUR MEDS BY ELIG CLIN: HCPCS | Performed by: INTERNAL MEDICINE

## 2021-09-21 PROCEDURE — G8427 DOCREV CUR MEDS BY ELIG CLIN: HCPCS | Performed by: OBSTETRICS & GYNECOLOGY

## 2021-09-21 PROCEDURE — 1036F TOBACCO NON-USER: CPT | Performed by: OBSTETRICS & GYNECOLOGY

## 2021-09-21 PROCEDURE — 90732 PPSV23 VACC 2 YRS+ SUBQ/IM: CPT | Performed by: INTERNAL MEDICINE

## 2021-09-21 PROCEDURE — 99214 OFFICE O/P EST MOD 30 MIN: CPT | Performed by: INTERNAL MEDICINE

## 2021-09-21 PROCEDURE — 1123F ACP DISCUSS/DSCN MKR DOCD: CPT | Performed by: INTERNAL MEDICINE

## 2021-09-21 PROCEDURE — G8417 CALC BMI ABV UP PARAM F/U: HCPCS | Performed by: INTERNAL MEDICINE

## 2021-09-21 PROCEDURE — G0009 ADMIN PNEUMOCOCCAL VACCINE: HCPCS | Performed by: INTERNAL MEDICINE

## 2021-09-21 PROCEDURE — 87070 CULTURE OTHR SPECIMN AEROBIC: CPT

## 2021-09-21 PROCEDURE — G8399 PT W/DXA RESULTS DOCUMENT: HCPCS | Performed by: OBSTETRICS & GYNECOLOGY

## 2021-09-21 PROCEDURE — 1123F ACP DISCUSS/DSCN MKR DOCD: CPT | Performed by: OBSTETRICS & GYNECOLOGY

## 2021-09-21 PROCEDURE — 99213 OFFICE O/P EST LOW 20 MIN: CPT | Performed by: OBSTETRICS & GYNECOLOGY

## 2021-09-21 PROCEDURE — 1090F PRES/ABSN URINE INCON ASSESS: CPT | Performed by: OBSTETRICS & GYNECOLOGY

## 2021-09-21 PROCEDURE — 4040F PNEUMOC VAC/ADMIN/RCVD: CPT | Performed by: OBSTETRICS & GYNECOLOGY

## 2021-09-21 PROCEDURE — 3017F COLORECTAL CA SCREEN DOC REV: CPT | Performed by: OBSTETRICS & GYNECOLOGY

## 2021-09-21 PROCEDURE — 1090F PRES/ABSN URINE INCON ASSESS: CPT | Performed by: INTERNAL MEDICINE

## 2021-09-21 PROCEDURE — 1036F TOBACCO NON-USER: CPT | Performed by: INTERNAL MEDICINE

## 2021-09-21 PROCEDURE — G8399 PT W/DXA RESULTS DOCUMENT: HCPCS | Performed by: INTERNAL MEDICINE

## 2021-09-21 PROCEDURE — 3017F COLORECTAL CA SCREEN DOC REV: CPT | Performed by: INTERNAL MEDICINE

## 2021-09-21 RX ORDER — LIDOCAINE 50 MG/G
OINTMENT TOPICAL
Qty: 1 EACH | Refills: 3 | Status: SHIPPED | OUTPATIENT
Start: 2021-09-21

## 2021-09-21 RX ORDER — DOCUSATE SODIUM 100 MG/1
CAPSULE, LIQUID FILLED ORAL
COMMUNITY
Start: 2021-09-01 | End: 2021-10-04

## 2021-09-21 RX ORDER — CLOBETASOL PROPIONATE 0.5 MG/G
CREAM TOPICAL
Qty: 60 G | Refills: 3 | Status: SHIPPED | OUTPATIENT
Start: 2021-09-21

## 2021-09-21 RX ORDER — OMEPRAZOLE 20 MG/1
CAPSULE, DELAYED RELEASE ORAL
Qty: 90 CAPSULE | Refills: 1 | Status: SHIPPED | OUTPATIENT
Start: 2021-09-21 | End: 2022-01-27 | Stop reason: ALTCHOICE

## 2021-09-21 ASSESSMENT — ENCOUNTER SYMPTOMS
SHORTNESS OF BREATH: 0
SORE THROAT: 0
ABDOMINAL PAIN: 0
COUGH: 0
NAUSEA: 0

## 2021-09-21 NOTE — PATIENT INSTRUCTIONS
SURVEY:    You may be receiving a survey from [email or web address] regarding your visit today. Please complete the survey to enable us to provide the highest quality of care to you and your family. If you cannot score us a very good on any question, please call the office to discuss how we could of made your experience a very good one. Thank you. You may be receiving a survey from [email or web address] regarding your visit today. You may get this in the mail, through your MyChart or in your email. Please complete the survey to enable us to provide the highest quality of care to you and your family. If you cannot score us as very good ( 5 Stars) on any question, please feel free to call the office to discuss how we could have made your experience exceptional.     Thank You!       MD Greg Locke

## 2021-09-21 NOTE — PROGRESS NOTES
PROBLEM VISIT     Date of service: 2021    Simón Santana  Is a 74 y.o.  female    PT's PCP is: Manuela Alvarado MD     : 1955                                             Subjective:       No LMP recorded. Patient has had a hysterectomy. OB History    Para Term  AB Living   5 5 5     5   SAB TAB Ectopic Molar Multiple Live Births             5      # Outcome Date GA Lbr Ernesto/2nd Weight Sex Delivery Anes PTL Lv   5 Term 89 40w0d  7 lb (3.175 kg) F Vag-Spont   ADA   4 Term 84 40w0d  10 lb (4.536 kg) M Vag-Spont   ADA   3 Term 77 40w0d  9 lb 9 oz (4.338 kg) M Vag-Spont   ADA   2 Term 76 40w0d  7 lb (3.175 kg) F Vag-Spont   ADA   1 Term 75 40w0d  10 lb (4.536 kg) M Vag-Spont   ADA        Social History     Tobacco Use   Smoking Status Never Smoker   Smokeless Tobacco Never Used        Social History     Substance and Sexual Activity   Alcohol Use No       Allergies: Patient has no known allergies.       Current Outpatient Medications:     docusate sodium (COLACE) 100 MG capsule, take 1 capsule by mouth daily (Patient not taking: Reported on 2021), Disp: , Rfl:     rivaroxaban (XARELTO) 20 MG TABS tablet, take 1 tablet by mouth every morning with breakfast, Disp: 90 tablet, Rfl: 1    omeprazole (PRILOSEC) 20 MG delayed release capsule, take 1 capsule by mouth once daily, Disp: 90 capsule, Rfl: 1    levothyroxine (SYNTHROID) 50 MCG tablet, take 1 tablet by mouth once daily, Disp: 90 tablet, Rfl: 1    dilTIAZem (CARDIZEM) 30 MG tablet, take 1 tablet by mouth twice a day, Disp: 180 tablet, Rfl: 1    hydrocortisone (ANUSOL-HC) 25 MG suppository, Place 1 suppository rectally every 12 hours, Disp: 10 suppository, Rfl: 1    hydrocortisone (ANUSOL-HC) 2.5 % CREA rectal cream, Place rectally 2 times daily, Disp: 1 Tube, Rfl: 1    diclofenac sodium (VOLTAREN) 1 % GEL, Apply topically 2 times daily, Disp: 150 g, Rfl: 3    conjugated estrogens (PREMARIN) 0.625 MG/GM vaginal cream, Apply 2 gram intravaginally once a day at bed time x 2 wks  And then, 1 gram once a day x 7 days as needed, Disp: 42.5 g, Rfl: 0    phenylephrine-mineral oil-petrolatum (HEMORRHOIDAL) 0.25-14-74.9 % rectal ointment, Place rectally 2 times daily as needed for Hemorrhoids (Patient not taking: Reported on 2/11/2021), Disp: 28 g, Rfl: 0    clobetasol (TEMOVATE) 0.05 % cream, Using a thin layer daily as needed for itching. Not to exceed 6 weeks of continuous use, Disp: 60 g, Rfl: 3    albuterol (PROVENTIL) (2.5 MG/3ML) 0.083% nebulizer solution, Take 3 mLs by nebulization every 6 hours as needed for Wheezing, Disp: 120 each, Rfl: 1    albuterol sulfate HFA (PROVENTIL HFA) 108 (90 Base) MCG/ACT inhaler, Inhale 2 puffs into the lungs every 4 hours as needed for Wheezing or Shortness of Breath (Patient not taking: Reported on 6/21/2021), Disp: 1 Inhaler, Rfl: 1    fluticasone-salmeterol (ADVAIR HFA) 115-21 MCG/ACT inhaler, Inhale 2 puffs into the lungs 2 times daily (Patient not taking: Reported on 6/21/2021), Disp: 1 Inhaler, Rfl: 3    triamcinolone (KENALOG) 0.1 % cream, Apply to the affected area 2 times a day as needed for vulvar itching, Disp: 80 g, Rfl: 2    Social History     Substance and Sexual Activity   Sexual Activity Yes    Partners: Male       Last Yearly:  2/5/19    Last pap: 2/5/19    Last HPV: never    Chief Complaint   Patient presents with    Vaginal Itching     Pt presents today for really bad vaginal itching, on going for awhile. Was on steriod cream awhile ago, states it only \"numbs\" it doesn't take care of it. NURSE: JANET    PE:  Vital Signs  Blood pressure 120/86, height 5' 6\" (1.676 m), weight 194 lb (88 kg), not currently breastfeeding. Labs:    No results found for this visit on 09/21/21. NURSE: Josephine    HPI: The patient is here today with complaints of vaginal and vulvar itching.   She has been using the steroid cream but it only seems to temporarily numb the area. No  PT denies fever, chills, nausea and vomiting       Objective: \The patient does have known lichen planus. This is still present in the vulva and the most distal portions of the vagina. There were no worrisome skin lesions on the vulva. No obvious vaginal discharge but culture obtained. Of note on patient's med left lateral forearm there is a irregular pigmented area                           Assessment and Plan: Through , I did stress the importance of using the clobetasol as needed and give her 5% lidocaine ointment to help with the worst of the symptoms. Also the patient is going to see a dermatologist for further evaluation of the skin lesion. Diagnosis Orders   1. Vaginal itching  Culture, Genital   2. Vulvar dystrophy  clobetasol (TEMOVATE) 0.05 % cream    lidocaine (XYLOCAINE) 5 % ointment             No follow-ups on file. FF: 20 minutes    There are no Patient Instructions on file for this visit. Over 75%of time spent on counseling and care coordination on: see assessment and plan,  She was also counseled on her preventative health maintenance recommendations and follow-up.       Alejandro Cornell MD,9/21/2021 10:54 AM

## 2021-09-21 NOTE — PROGRESS NOTES
After obtaining consent, and per orders of Dr. Cristy Alcantar, injection of [id number] given in Right deltoid by Montez Beckett MA. Patient instructed to remain in clinic for 20 minutes afterwards, and to report any adverse reaction to me immediately.

## 2021-09-21 NOTE — PROGRESS NOTES
HPI Notes    Name: Barbara Bates  : 1955         Chief Complaint:     Chief Complaint   Patient presents with    Hypertension     Patient presents today for a 3 month check up    Gastroesophageal Reflux    Asthma    Breast Pain     Patient c/o pain in the left breast for 3 days. She states her mammogram from February was normal. The pain has since gone away. History of Present Illness:        Shane Orozco is a very pleasant English-speaking woman who presents to office to follow-up for hypertension, GERD, asthma    She is accompanied by her daughter Elizabeth Hassan who is very fluent in [place] and helps with interpretation  Today she complains of pain in the left breast for the past 3 days. She had a normal mammogram on 21  States the pain was all over the left breast, felt like squeezing. did not feel any lumps. Had no skin changes, no nipple discharge. She was rubbing it and it went away. Hypertension  This is a chronic problem. The current episode started more than 1 year ago. The problem is unchanged. The problem is controlled. Pertinent negatives include no anxiety, chest pain, headaches, palpitations, peripheral edema or shortness of breath. There are no associated agents to hypertension. Past treatments include calcium channel blockers. The current treatment provides significant improvement. There are no compliance problems. There is no history of kidney disease, CAD/MI or CVA. Gastroesophageal Reflux  She reports no abdominal pain, no chest pain, no coughing, no nausea or no sore throat. This is a chronic problem. The current episode started more than 1 year ago. The problem has been unchanged. The symptoms are aggravated by certain foods. Pertinent negatives include no fatigue or weight loss. Risk factors include obesity. She has tried a PPI for the symptoms. The treatment provided significant relief. Asthma  There is no cough or shortness of breath. This is a chronic problem.  The current episode started more than 1 year ago. The problem occurs rarely. Pertinent negatives include no appetite change, chest pain, fever, headaches, sore throat or weight loss. Her symptoms are aggravated by change in weather and URI. Her symptoms are alleviated by beta-agonist. She reports complete improvement on treatment. Her past medical history is significant for asthma. Past Medical History:     Past Medical History:   Diagnosis Date    Arthritis     Asthma     Chickenpox     Hypertension     Hypothyroidism     Measles     Mumps     Osteoarthritis     Smallpox     Ulcer     Ulcer     Whooping cough       Reviewed all health maintenance requirements and orderedappropriate tests  Health Maintenance Due   Topic Date Due    COVID-19 Vaccine (1) Never done    DTaP/Tdap/Td vaccine (1 - Tdap) Never done    Shingles Vaccine (1 of 2) Never done   [employer] Visit (AWV)  Never done    Flu vaccine (1) 09/01/2021       Past Surgical History:     Past Surgical History:   Procedure Laterality Date    CHOLECYSTECTOMY, LAPAROSCOPIC N/A 3/4/2019    CHOLECYSTECTOMY LAPAROSCOPIC performed by Jose Eduardo Luna MD at [address]  07/20/2015    Merlin Erm MD    HYSTERECTOMY  2006    Vaginal hysterectomy dr Saira Gallo  07/20/2015    Merlin Erm MD        Medications:       Prior to Admission medications    Medication Sig Start Date End Date Taking?  Authorizing Provider   rivaroxaban (XARELTO) 20 MG TABS tablet take 1 tablet by mouth every morning with breakfast 9/21/21  Yes Naman Rutledge MD   omeprazole (PRILOSEC) 20 MG delayed release capsule take 1 capsule by mouth once daily 9/21/21  Yes Naman Rutledge MD   levothyroxine (SYNTHROID) 50 MCG tablet take 1 tablet by mouth once daily 9/9/21  Yes Naman Rutledge MD   dilTIAZem (CARDIZEM) 30 MG tablet take 1 tablet by mouth twice a day 9/7/21  Yes Naman Rutledge MD   hydrocortisone (ANUSOL-HC) 25 MG suppository Place 1 suppository rectally every 12 hours 6/21/21  Yes Collette Hayes MD   hydrocortisone (ANUSOL-HC) 2.5 % CREA rectal cream Place rectally 2 times daily 6/21/21  Yes Collette Hayes MD   diclofenac sodium (VOLTAREN) 1 % GEL Apply topically 2 times daily 3/22/21  Yes Collette Hayes MD   conjugated estrogens (PREMARIN) 0.625 MG/GM vaginal cream Apply 2 gram intravaginally once a day at bed time x 2 wks  And then, 1 gram once a day x 7 days as needed 12/21/20  Yes Collette Hayes MD   albuterol (PROVENTIL) (2.5 MG/3ML) 0.083% nebulizer solution Take 3 mLs by nebulization every 6 hours as needed for Wheezing 4/13/20  Yes Collette Hayes MD   triamcinolone (KENALOG) 0.1 % cream Apply to the affected area 2 times a day as needed for vulvar itching 12/10/19  Yes Collette Hayes MD   docusate sodium (COLACE) 100 MG capsule take 1 capsule by mouth daily  Patient not taking: Reported on 9/21/2021 9/1/21   Historical Provider, MD   clobetasol (TEMOVATE) 0.05 % cream Using a thin layer daily as needed for itching. Not to exceed 6 weeks of continuous use 9/21/21   Jose Manuel Herndon MD   lidocaine (XYLOCAINE) 5 % ointment Apply topically as needed every 2 hours 9/21/21   Jose Manuel Herndon MD   phenylephrine-mineral oil-petrolatum (HEMORRHOIDAL) 0.25-14-74.9 % rectal ointment Place rectally 2 times daily as needed for Hemorrhoids  Patient not taking: Reported on 2/11/2021 6/15/20   Collette Hayes MD   albuterol sulfate HFA (PROVENTIL HFA) 108 (90 Base) MCG/ACT inhaler Inhale 2 puffs into the lungs every 4 hours as needed for Wheezing or Shortness of Breath  Patient not taking: Reported on 6/21/2021 1/10/20   Collette Hayes MD   fluticasone-salmeterol (ADVAIR HFA) 318-53 MCG/ACT inhaler Inhale 2 puffs into the lungs 2 times daily  Patient not taking: Reported on 6/21/2021 1/10/20   Collette Hayes MD        Allergies:       Patient has no known allergies.     Social History:     Tobacco: reports that she has never smoked. She has never used smokeless tobacco.  Alcohol:      reports no history of alcohol use. Drug Use:  reports no history of drug use. Family History:     Family History   Problem Relation Age of Onset    Diabetes Father        Review of Systems:         Review of Systems   Constitutional: Negative for activity change, appetite change, chills, fatigue, fever and weight loss. HENT: Negative for congestion and sore throat. Respiratory: Negative for cough and shortness of breath. Cardiovascular: Positive for leg swelling. Negative for chest pain and palpitations. Gastrointestinal: Negative for abdominal pain and nausea. Genitourinary: Negative for dysuria. Skin: Negative for rash. Neurological: Negative for dizziness, tremors, numbness and headaches. Psychiatric/Behavioral: The patient is not nervous/anxious. Physical Exam:     Vitals:  /86 (Site: Left Upper Arm, Position: Sitting, Cuff Size: Medium Adult)   Pulse 70   Temp 97 °F (36.1 °C)   Wt 194 lb 3.2 oz (88.1 kg)   SpO2 97%   BMI 31.34 kg/m²       Physical Exam  Vitals reviewed. Constitutional:       General: She is not in acute distress. Appearance: Normal appearance. She is well-developed. HENT:      Head: Normocephalic and atraumatic. Right Ear: Tympanic membrane, ear canal and external ear normal.      Left Ear: Tympanic membrane, ear canal and external ear normal.      Nose: Nose normal. No congestion. Eyes:      General: No scleral icterus. Right eye: No discharge. Left eye: No discharge. Conjunctiva/sclera: Conjunctivae normal.   Neck:      Thyroid: No thyromegaly. Cardiovascular:      Rate and Rhythm: Normal rate and regular rhythm. Heart sounds: Normal heart sounds. No murmur heard. Pulmonary:      Effort: Pulmonary effort is normal.      Breath sounds: Normal breath sounds. No wheezing or rales.    Abdominal:      General: Bowel sounds are normal. There is no distension. Palpations: Abdomen is soft. There is no mass. Tenderness: There is no abdominal tenderness. Musculoskeletal:         General: Normal range of motion. Right lower leg: Edema present. Left lower leg: Edema (mild) present. Lymphadenopathy:      Cervical: No cervical adenopathy. Skin:     General: Skin is warm and dry. Coloration: Skin is not pale. Findings: No rash. Neurological:      General: No focal deficit present. Mental Status: She is alert and oriented to person, place, and time. Psychiatric:         Mood and Affect: Mood normal.         Behavior: Behavior normal.         Judgment: Judgment normal.               Data:     Lab Results   Component Value Date     01/30/2021    K 4.1 01/30/2021     01/30/2021    CO2 29 01/30/2021    BUN 15 01/30/2021    CREATININE 0.84 01/30/2021    GLUCOSE 97 01/30/2021    PROT 8.5 01/30/2021    LABALBU 4.2 01/30/2021    BILITOT 0.56 01/30/2021    ALKPHOS 117 01/30/2021    AST 15 01/30/2021    ALT 20 01/30/2021     Lab Results   Component Value Date    WBC 5.9 01/30/2021    RBC 5.28 01/30/2021    HGB 15.1 01/30/2021    HCT 45.6 01/30/2021    MCV 86.4 01/30/2021    MCH 28.7 01/30/2021    MCHC 33.2 01/30/2021    RDW 14.3 01/30/2021     01/30/2021    MPV NOT REPORTED 01/30/2021     Lab Results   Component Value Date    TSH 2.94 01/30/2021     Lab Results   Component Value Date    CHOL 156 01/30/2021    HDL 57 01/30/2021    LABA1C 5.6 07/08/2019          Assessment & Plan        Diagnosis Orders   1. Essential hypertension   Blood pressure is stable, continue on Cardizem    2. Breast pain, left   We will check diagnostic mammogram [facility] DIGITAL DIAGNOSTIC W OR WO CAD LEFT   3. Uncomplicated asthma, unspecified asthma severity, unspecified whether persistent   Symptoms stable. Uses albuterol as needed    4. Gastroesophageal reflux disease without esophagitis   Symptoms controlled.   Continue on Prilosec how we could have made your experience exceptional.     Thank You!       Manuela Alvarado MD  [address] [facility]  [facility]        Electronically signed by Manuela Alvarado MD on 9/21/2021 at 11:28 AM           Completed Refills      Requested Prescriptions     Signed Prescriptions Disp Refills    rivaroxaban (XARELTO) 20 MG TABS tablet 90 tablet 1     Sig: take 1 tablet by mouth every morning with breakfast    omeprazole (PRILOSEC) 20 MG delayed release capsule 90 capsule 1     Sig: take 1 capsule by mouth once daily

## 2021-09-25 LAB
CULTURE: NORMAL
CULTURE: NORMAL
Lab: NORMAL
SPECIMEN DESCRIPTION: NORMAL

## 2021-09-29 ENCOUNTER — TELEPHONE (OUTPATIENT)
Dept: FAMILY MEDICINE CLINIC | Age: 66
End: 2021-09-29

## 2021-09-29 DIAGNOSIS — N64.4 BREAST PAIN, LEFT: Primary | ICD-10-CM

## 2021-09-29 DIAGNOSIS — R92.8 ABNORMAL MAMMOGRAM OF LEFT BREAST: ICD-10-CM

## 2021-09-29 NOTE — TELEPHONE ENCOUNTER
Scheduling sent a fax requesting a new mammogram order. It says do to new findings patient will need a left breast ultrasound and to please add new order in Epic.      Order pended

## 2021-10-04 RX ORDER — DOCUSATE SODIUM 100 MG/1
CAPSULE, LIQUID FILLED ORAL
Qty: 30 CAPSULE | Refills: 3 | Status: SHIPPED | OUTPATIENT
Start: 2021-10-04

## 2021-10-07 ENCOUNTER — HOSPITAL ENCOUNTER (OUTPATIENT)
Dept: ULTRASOUND IMAGING | Age: 66
Discharge: HOME | End: 2021-10-09
Payer: MEDICARE

## 2021-10-07 ENCOUNTER — HOSPITAL ENCOUNTER (OUTPATIENT)
Dept: MAMMOGRAPHY | Age: 66
Discharge: HOME | End: 2021-10-09
Payer: MEDICARE

## 2021-10-07 DIAGNOSIS — N64.4 BREAST PAIN, LEFT: ICD-10-CM

## 2021-10-07 PROCEDURE — G0279 TOMOSYNTHESIS, MAMMO: HCPCS

## 2021-12-09 ENCOUNTER — OFFICE VISIT (OUTPATIENT)
Dept: FAMILY MEDICINE CLINIC | Age: 66
End: 2021-12-09
Payer: MEDICARE

## 2021-12-09 ENCOUNTER — HOSPITAL ENCOUNTER (OUTPATIENT)
Age: 66
Discharge: HOME | End: 2021-12-09
Payer: MEDICARE

## 2021-12-09 VITALS
TEMPERATURE: 97.1 F | WEIGHT: 194 LBS | HEART RATE: 74 BPM | SYSTOLIC BLOOD PRESSURE: 122 MMHG | DIASTOLIC BLOOD PRESSURE: 84 MMHG | BODY MASS INDEX: 31.18 KG/M2 | HEIGHT: 66 IN | RESPIRATION RATE: 18 BRPM | OXYGEN SATURATION: 97 %

## 2021-12-09 DIAGNOSIS — R19.7 DIARRHEA, UNSPECIFIED TYPE: ICD-10-CM

## 2021-12-09 DIAGNOSIS — N39.0 URINARY TRACT INFECTION WITHOUT HEMATURIA, SITE UNSPECIFIED: ICD-10-CM

## 2021-12-09 DIAGNOSIS — R10.9 ABDOMINAL PAIN, UNSPECIFIED ABDOMINAL LOCATION: Primary | ICD-10-CM

## 2021-12-09 LAB
-: NORMAL
ABSOLUTE EOS #: 0 K/UL (ref 0–0.4)
ABSOLUTE IMMATURE GRANULOCYTE: NORMAL K/UL (ref 0–0.3)
ABSOLUTE LYMPH #: 1.5 K/UL (ref 1–4.8)
ABSOLUTE MONO #: 0.5 K/UL (ref 0–1)
ALBUMIN SERPL-MCNC: 4 G/DL (ref 3.5–5.2)
ALBUMIN/GLOBULIN RATIO: ABNORMAL (ref 1–2.5)
ALP BLD-CCNC: 106 U/L (ref 35–104)
ALT SERPL-CCNC: 13 U/L (ref 5–33)
ANION GAP SERPL CALCULATED.3IONS-SCNC: 8 MMOL/L (ref 9–17)
AST SERPL-CCNC: 13 U/L
BASOPHILS # BLD: 1 % (ref 0–2)
BASOPHILS ABSOLUTE: 0 K/UL (ref 0–0.2)
BILIRUB SERPL-MCNC: 0.28 MG/DL (ref 0.3–1.2)
BILIRUBIN DIRECT: <0.08 MG/DL
BILIRUBIN, INDIRECT: ABNORMAL MG/DL (ref 0–1)
BILIRUBIN, POC: ABNORMAL
BLOOD URINE, POC: ABNORMAL
BUN BLDV-MCNC: 12 MG/DL (ref 8–23)
BUN/CREAT BLD: 17 (ref 9–20)
CALCIUM SERPL-MCNC: 9.4 MG/DL (ref 8.6–10.4)
CHLORIDE BLD-SCNC: 101 MMOL/L (ref 98–107)
CLARITY, POC: CLEAR
CO2: 28 MMOL/L (ref 20–31)
COLOR, POC: YELLOW
CREAT SERPL-MCNC: 0.69 MG/DL (ref 0.5–0.9)
DIFFERENTIAL TYPE: YES
EOSINOPHILS RELATIVE PERCENT: 0 % (ref 0–5)
GFR AFRICAN AMERICAN: >60 ML/MIN
GFR NON-AFRICAN AMERICAN: >60 ML/MIN
GFR SERPL CREATININE-BSD FRML MDRD: ABNORMAL ML/MIN/{1.73_M2}
GFR SERPL CREATININE-BSD FRML MDRD: ABNORMAL ML/MIN/{1.73_M2}
GLOBULIN: ABNORMAL G/DL (ref 1.5–3.8)
GLUCOSE BLD-MCNC: 102 MG/DL (ref 70–99)
GLUCOSE URINE, POC: ABNORMAL
HCT VFR BLD CALC: 42.8 % (ref 36–46)
HEMOGLOBIN: 14.1 G/DL (ref 12–16)
IMMATURE GRANULOCYTES: NORMAL %
KETONES, POC: ABNORMAL
LEUKOCYTE EST, POC: ABNORMAL
LYMPHOCYTES # BLD: 23 % (ref 15–40)
MCH RBC QN AUTO: 28.3 PG (ref 26–34)
MCHC RBC AUTO-ENTMCNC: 32.8 G/DL (ref 31–37)
MCV RBC AUTO: 86.3 FL (ref 80–100)
MONOCYTES # BLD: 8 % (ref 4–8)
NITRITE, POC: ABNORMAL
NRBC AUTOMATED: NORMAL PER 100 WBC
PDW BLD-RTO: 14.3 % (ref 12.1–15.2)
PH, POC: 5.5
PLATELET # BLD: 289 K/UL (ref 140–450)
PLATELET ESTIMATE: NORMAL
PMV BLD AUTO: NORMAL FL (ref 6–12)
POTASSIUM SERPL-SCNC: 4.2 MMOL/L (ref 3.7–5.3)
PROTEIN, POC: ABNORMAL
RBC # BLD: 4.96 M/UL (ref 4–5.2)
RBC # BLD: NORMAL 10*6/UL
REASON FOR REJECTION: NORMAL
SEG NEUTROPHILS: 68 % (ref 47–75)
SEGMENTED NEUTROPHILS ABSOLUTE COUNT: 4.7 K/UL (ref 2.5–7)
SODIUM BLD-SCNC: 137 MMOL/L (ref 135–144)
SPECIFIC GRAVITY, POC: 1.01
TOTAL PROTEIN: 7.8 G/DL (ref 6.4–8.3)
UROBILINOGEN, POC: 0.2
WBC # BLD: 6.8 K/UL (ref 3.5–11)
WBC # BLD: NORMAL 10*3/UL
ZZ NTE CLEAN UP: ORDERED TEST: NORMAL
ZZ NTE WITH NAME CLEAN UP: SPECIMEN SOURCE: NORMAL

## 2021-12-09 PROCEDURE — 4040F PNEUMOC VAC/ADMIN/RCVD: CPT | Performed by: INTERNAL MEDICINE

## 2021-12-09 PROCEDURE — 80076 HEPATIC FUNCTION PANEL: CPT

## 2021-12-09 PROCEDURE — 1036F TOBACCO NON-USER: CPT | Performed by: INTERNAL MEDICINE

## 2021-12-09 PROCEDURE — 99213 OFFICE O/P EST LOW 20 MIN: CPT | Performed by: INTERNAL MEDICINE

## 2021-12-09 PROCEDURE — G8399 PT W/DXA RESULTS DOCUMENT: HCPCS | Performed by: INTERNAL MEDICINE

## 2021-12-09 PROCEDURE — G8427 DOCREV CUR MEDS BY ELIG CLIN: HCPCS | Performed by: INTERNAL MEDICINE

## 2021-12-09 PROCEDURE — 80048 BASIC METABOLIC PNL TOTAL CA: CPT

## 2021-12-09 PROCEDURE — 3017F COLORECTAL CA SCREEN DOC REV: CPT | Performed by: INTERNAL MEDICINE

## 2021-12-09 PROCEDURE — 85025 COMPLETE CBC W/AUTO DIFF WBC: CPT

## 2021-12-09 PROCEDURE — 1090F PRES/ABSN URINE INCON ASSESS: CPT | Performed by: INTERNAL MEDICINE

## 2021-12-09 PROCEDURE — 1123F ACP DISCUSS/DSCN MKR DOCD: CPT | Performed by: INTERNAL MEDICINE

## 2021-12-09 PROCEDURE — 36415 COLL VENOUS BLD VENIPUNCTURE: CPT

## 2021-12-09 PROCEDURE — 81002 URINALYSIS NONAUTO W/O SCOPE: CPT | Performed by: INTERNAL MEDICINE

## 2021-12-09 PROCEDURE — G8417 CALC BMI ABV UP PARAM F/U: HCPCS | Performed by: INTERNAL MEDICINE

## 2021-12-09 PROCEDURE — G8484 FLU IMMUNIZE NO ADMIN: HCPCS | Performed by: INTERNAL MEDICINE

## 2021-12-09 RX ORDER — CEPHALEXIN 500 MG/1
500 CAPSULE ORAL 3 TIMES DAILY
Qty: 15 CAPSULE | Refills: 0 | Status: SHIPPED | OUTPATIENT
Start: 2021-12-09 | End: 2021-12-14 | Stop reason: ALTCHOICE

## 2021-12-09 RX ORDER — METRONIDAZOLE 500 MG/1
500 TABLET ORAL 3 TIMES DAILY
Qty: 21 TABLET | Refills: 0 | Status: SHIPPED | OUTPATIENT
Start: 2021-12-09 | End: 2021-12-14 | Stop reason: ALTCHOICE

## 2021-12-09 ASSESSMENT — ENCOUNTER SYMPTOMS
ABDOMINAL DISTENTION: 0
BLOOD IN STOOL: 0
DIARRHEA: 1
NAUSEA: 0
BLOATING: 1
RECTAL PAIN: 0
SORE THROAT: 0
ABDOMINAL PAIN: 1
VOMITING: 0
COUGH: 0
SHORTNESS OF BREATH: 0

## 2021-12-09 NOTE — PROGRESS NOTES
HPI Notes    Name: Nelli Bonilla  : 1955         Chief Complaint:     Chief Complaint   Patient presents with    Abdominal Cramping     x 3 days c/o diarrhea        History of Present Illness:        Jonne Boast is a [address] [place] [address] speaking woman who  presents to office for evaluation of abdominal pain and diarrhea. She is accompanied by her son who help with interpretation. Symptoms started about 3 days ago. No recent travel. No recent antibiotics or change in meds. Did not eat anything new. Last week went to a dentist and took some medications but not sure what it was    She is accompanied by her son who helps with interpretation from [address] [place] [address] to [facility]    She has a history of cholecystectomy and hysterectomy in the past.  She has a history of chronic abdominal pain for many years. Underwent EGD and colonoscopy in . Was treated for H. Pylori. She had MRI of abdomen on 10/10/2017 revealin. A cyst in the mid pole posterior aspect of the left kidney is most likely a   hemorrhagic cyst since there is no enhancement of this lesion. Other lesions in   both kidneys except for the lesion in the mid pole medial aspect of the left   kidney, which is suboptimally characterized have signal characteristics   compatible with simple cysts.   2. There are probably at least two simple cysts in the liver.   3. Signal characteristics of the liver are concerning for fatty infiltration of   the liver.       Most recent CT scan of abdomen and pelvis with IV contrast was done on 19 revealing:    No change in multiple hepatic and renal cysts.   Cholecystectomy and hysterectomy. No unexpected pelvic mass or free fluid.   Normal appendix. No renal or ureteral calculi.           Abdominal Pain  This is a chronic problem. The current episode started more than 1 year ago. The problem occurs 2 to 4 times per day. The pain is located in the RLQ, RUQ and periumbilical region. The pain is mild.  The quality of the pain is aching. The abdominal pain does not radiate. Associated symptoms include diarrhea. Pertinent negatives include no dysuria, fever, headaches, nausea or vomiting. Treatments tried: Pepto-Bismol. The treatment provided no relief. Diarrhea   This is a new problem. The current episode started in the past 7 days. The problem occurs 5 to 10 times per day. The problem has been unchanged. The stool consistency is described as watery. Associated symptoms include abdominal pain and bloating. Pertinent negatives include no chills, coughing, fever, headaches or vomiting. There are no known risk factors. She has tried bismuth subsalicylate for the symptoms. The treatment provided no relief. There is no history of inflammatory bowel disease or a recent abdominal surgery. Past Medical History:     Past Medical History:   Diagnosis Date    Arthritis     Asthma     Chickenpox     Hypertension     Hypothyroidism     Measles     Mumps     Osteoarthritis     Smallpox     Ulcer     Ulcer     Whooping cough       Reviewed all health maintenance requirements and orderedappropriate tests  Health Maintenance Due   Topic Date Due    Annual Wellness Visit (AWV)  08/04/2021    COVID-19 Vaccine (3 - Booster for Gauri April series) 10/09/2021       Past Surgical History:     Past Surgical History:   Procedure Laterality Date    CHOLECYSTECTOMY, LAPAROSCOPIC N/A 3/4/2019    CHOLECYSTECTOMY LAPAROSCOPIC performed by Levi Aviles MD at [address] [place], [place] [address]  07/20/2015    [address]    HYSTERECTOMY  2006    Vaginal hysterectomy dr Garland Quiroz  07/20/2015    [address] MD        Medications:       Prior to Admission medications    Medication Sig Start Date End Date Taking?  Authorizing Provider   cephALEXin (KEFLEX) 500 MG capsule Take 1 capsule by mouth 3 times daily for 5 days 12/9/21 12/14/21 Yes Monalisa Cruz MD   metroNIDAZOLE (FLAGYL) 500 MG tablet Take 1 tablet by mouth 3 times daily for 7 days 12/9/21 12/16/21 Yes Matt Fitzpatrick MD   docusate sodium (COLACE) 100 MG capsule take 1 capsule by mouth daily 10/4/21  Yes Matt Fitzpatrick MD   rivaroxaban (XARELTO) 20 MG TABS tablet take 1 tablet by mouth every morning with breakfast 9/21/21  Yes Matt Fitzpatrick MD   omeprazole (PRILOSEC) 20 MG delayed release capsule take 1 capsule by mouth once daily 9/21/21  Yes Matt Fitzpatrick MD   clobetasol (TEMOVATE) 0.05 % cream Using a thin layer daily as needed for itching.   Not to exceed 6 weeks of continuous use 9/21/21  Yes Morena Pino MD   lidocaine (XYLOCAINE) 5 % ointment Apply topically as needed every 2 hours 9/21/21  Yes Morena Pino MD   levothyroxine (SYNTHROID) 50 MCG tablet take 1 tablet by mouth once daily 9/9/21  Yes Matt Fitzpatrick MD   dilTIAZem (CARDIZEM) 30 MG tablet take 1 tablet by mouth twice a day 9/7/21  Yes Matt Fitzpatrick MD   hydrocortisone (ANUSOL-HC) 25 MG suppository Place 1 suppository rectally every 12 hours 6/21/21  Yes Matt Fitzpatrick MD   hydrocortisone (ANUSOL-HC) 2.5 % CREA rectal cream Place rectally 2 times daily 6/21/21  Yes Matt Fitzpatrick MD   diclofenac sodium (VOLTAREN) 1 % GEL Apply topically 2 times daily 3/22/21  Yes Matt Fitzpatrick MD   conjugated estrogens (PREMARIN) 0.625 MG/GM vaginal cream Apply 2 gram intravaginally once a day at bed time x 2 wks  And then, 1 gram once a day x 7 days as needed 12/21/20  Yes Matt Fitzpatrick MD   albuterol (PROVENTIL) (2.5 MG/3ML) 0.083% nebulizer solution Take 3 mLs by nebulization every 6 hours as needed for Wheezing 4/13/20  Yes Matt Fitzpatrick MD   triamcinolone (KENALOG) 0.1 % cream Apply to the affected area 2 times a day as needed for vulvar itching 12/10/19  Yes Matt Fitzpatrick MD   phenylephrine-mineral oil-petrolatum (HEMORRHOIDAL) 0.25-14-74.9 % rectal ointment Place rectally 2 times daily as needed for Hemorrhoids  Patient not taking: Reported on 2/11/2021 6/15/20   Matt Fitzpatrick MD albuterol sulfate HFA (PROVENTIL HFA) 108 (90 Base) MCG/ACT inhaler Inhale 2 puffs into the lungs every 4 hours as needed for Wheezing or Shortness of Breath  Patient not taking: Reported on 6/21/2021 1/10/20   Myles Parham MD   fluticasone-salmeterol (ADVAIR HFA) 399-12 MCG/ACT inhaler Inhale 2 puffs into the lungs 2 times daily  Patient not taking: Reported on 6/21/2021 1/10/20   Myles Parham MD        Allergies:       Patient has no known allergies. Social History:     Tobacco: reports that she has never smoked. She has never used smokeless tobacco.  Alcohol:      reports no history of alcohol use. Drug Use:  reports no history of drug use. Family History:     Family History   Problem Relation Age of Onset    Diabetes Father        Review of Systems:         Review of Systems   Constitutional: Negative for activity change, appetite change, chills, diaphoresis, fatigue, fever and unexpected weight change. HENT: Negative for congestion and sore throat. Respiratory: Negative for cough and shortness of breath. Cardiovascular: Negative for chest pain and palpitations. Gastrointestinal: Positive for abdominal pain, bloating and diarrhea. Negative for abdominal distention, blood in stool, nausea, rectal pain and vomiting. Genitourinary: Negative for dysuria. Skin: Negative for rash. Neurological: Negative for dizziness, weakness and headaches. Psychiatric/Behavioral: The patient is not nervous/anxious. Physical Exam:     Vitals:  /84   Pulse 74   Temp 97.1 °F (36.2 °C)   Resp 18   Ht 5' 6\" (1.676 m)   Wt 194 lb (88 kg)   SpO2 97%   BMI 31.31 kg/m²       Physical Exam  Vitals reviewed. Constitutional:       General: She is not in acute distress. Appearance: Normal appearance. She is well-developed. She is not ill-appearing or toxic-appearing. HENT:      Head: Normocephalic and atraumatic.       Left Ear: External ear normal.      Nose: Nose normal. No congestion. Mouth/Throat:      Mouth: Mucous membranes are moist.      Pharynx: Oropharynx is clear. Eyes:      General: No scleral icterus. Right eye: No discharge. Left eye: No discharge. Conjunctiva/sclera: Conjunctivae normal.   Neck:      Thyroid: No thyromegaly. Cardiovascular:      Rate and Rhythm: Normal rate and regular rhythm. Heart sounds: Normal heart sounds. No murmur heard. Pulmonary:      Effort: Pulmonary effort is normal.      Breath sounds: Normal breath sounds. No wheezing, rhonchi or rales. Abdominal:      General: Bowel sounds are normal. There is no distension. Palpations: Abdomen is soft. There is no mass. Tenderness: There is no abdominal tenderness. There is no right CVA tenderness or left CVA tenderness. Hernia: No hernia is present. Musculoskeletal:         General: Normal range of motion. Right lower leg: No edema. Left lower leg: No edema. Lymphadenopathy:      Cervical: No cervical adenopathy. Skin:     General: Skin is warm and dry. Coloration: Skin is not jaundiced. Findings: No rash. Neurological:      General: No focal deficit present. Mental Status: She is alert and oriented to person, place, and time. Mental status is at baseline. Psychiatric:         Mood and Affect: Mood normal.         Behavior: Behavior normal.         Thought Content:  Thought content normal.               Data:     Lab Results   Component Value Date     01/30/2021    K 4.1 01/30/2021     01/30/2021    CO2 29 01/30/2021    BUN 15 01/30/2021    CREATININE 0.84 01/30/2021    GLUCOSE 97 01/30/2021    PROT 8.5 01/30/2021    LABALBU 4.2 01/30/2021    BILITOT 0.56 01/30/2021    ALKPHOS 117 01/30/2021    AST 15 01/30/2021    ALT 20 01/30/2021     Lab Results   Component Value Date    WBC 5.9 01/30/2021    RBC 5.28 01/30/2021    HGB 15.1 01/30/2021    HCT 45.6 01/30/2021    MCV 86.4 01/30/2021    MCH 28.7 01/30/2021 MCHC 33.2 01/30/2021    RDW 14.3 01/30/2021     01/30/2021    MPV NOT REPORTED 01/30/2021     Lab Results   Component Value Date    TSH 2.94 01/30/2021     Lab Results   Component Value Date    CHOL 156 01/30/2021    HDL 57 01/30/2021    LABA1C 5.6 07/08/2019          Assessment & Plan        Diagnosis Orders   1. Abdominal pain, unspecified abdominal location  metroNIDAZOLE (FLAGYL) 500 MG tablet   2. Urinary tract infection without hematuria, site unspecified  POCT Urinalysis no Micro    cephALEXin (KEFLEX) 500 MG capsule   3. Diarrhea, unspecified type  Basic Metabolic Panel    CBC Auto Differential    Clostridium Difficile Toxin/Antigen    Hepatic Function Panel     POCT UA positive for LE, will treat with Keflex for UTIher physical exam essentially benign, no abdominal tenderness. Will check labs to make sure she did not develop renal failure or electrolyte de arrangements from diarrhea/. Check stool for Cdiff. Will treat empirically with Flagyl. Follow up next week if no improvement                Completed Refills   Requested Prescriptions     Signed Prescriptions Disp Refills    cephALEXin (KEFLEX) 500 MG capsule 15 capsule 0     Sig: Take 1 capsule by mouth 3 times daily for 5 days    metroNIDAZOLE (FLAGYL) 500 MG tablet 21 tablet 0     Sig: Take 1 tablet by mouth 3 times daily for 7 days     No follow-ups on file.      Orders Placed This Encounter   Medications    cephALEXin (KEFLEX) 500 MG capsule     Sig: Take 1 capsule by mouth 3 times daily for 5 days     Dispense:  15 capsule     Refill:  0    metroNIDAZOLE (FLAGYL) 500 MG tablet     Sig: Take 1 tablet by mouth 3 times daily for 7 days     Dispense:  21 tablet     Refill:  0     Orders Placed This Encounter   Procedures    Clostridium Difficile Toxin/Antigen     Standing Status:   Future     Standing Expiration Date:   12/9/2022    Basic Metabolic Panel     Standing Status:   Future     Standing Expiration Date:   12/9/2022    CBC

## 2021-12-09 NOTE — PATIENT INSTRUCTIONS
SURVEY:    You may be receiving a survey from [employer] regarding your visit today. Please complete the survey to enable us to provide the highest quality of care to you and your family. If you cannot score us a very good on any question, please call the office to discuss how we could have made your experience a very good one. Thank you.

## 2021-12-14 ENCOUNTER — OFFICE VISIT (OUTPATIENT)
Dept: FAMILY MEDICINE CLINIC | Age: 66
End: 2021-12-14
Payer: MEDICARE

## 2021-12-14 VITALS
BODY MASS INDEX: 31.02 KG/M2 | HEIGHT: 66 IN | HEART RATE: 83 BPM | WEIGHT: 193 LBS | TEMPERATURE: 97.2 F | RESPIRATION RATE: 18 BRPM | OXYGEN SATURATION: 98 % | SYSTOLIC BLOOD PRESSURE: 126 MMHG | DIASTOLIC BLOOD PRESSURE: 80 MMHG

## 2021-12-14 DIAGNOSIS — E03.9 ACQUIRED HYPOTHYROIDISM: ICD-10-CM

## 2021-12-14 DIAGNOSIS — Z00.00 ROUTINE GENERAL MEDICAL EXAMINATION AT A HEALTH CARE FACILITY: ICD-10-CM

## 2021-12-14 DIAGNOSIS — I48.0 PAROXYSMAL ATRIAL FIBRILLATION (HCC): ICD-10-CM

## 2021-12-14 DIAGNOSIS — I48.91 ATRIAL FIBRILLATION, UNSPECIFIED TYPE (HCC): ICD-10-CM

## 2021-12-14 DIAGNOSIS — R19.7 DIARRHEA, UNSPECIFIED TYPE: Primary | ICD-10-CM

## 2021-12-14 PROCEDURE — 1036F TOBACCO NON-USER: CPT | Performed by: INTERNAL MEDICINE

## 2021-12-14 PROCEDURE — G8427 DOCREV CUR MEDS BY ELIG CLIN: HCPCS | Performed by: INTERNAL MEDICINE

## 2021-12-14 PROCEDURE — G8417 CALC BMI ABV UP PARAM F/U: HCPCS | Performed by: INTERNAL MEDICINE

## 2021-12-14 PROCEDURE — 99213 OFFICE O/P EST LOW 20 MIN: CPT | Performed by: INTERNAL MEDICINE

## 2021-12-14 PROCEDURE — 1090F PRES/ABSN URINE INCON ASSESS: CPT | Performed by: INTERNAL MEDICINE

## 2021-12-14 PROCEDURE — 4040F PNEUMOC VAC/ADMIN/RCVD: CPT | Performed by: INTERNAL MEDICINE

## 2021-12-14 PROCEDURE — 3017F COLORECTAL CA SCREEN DOC REV: CPT | Performed by: INTERNAL MEDICINE

## 2021-12-14 PROCEDURE — G8399 PT W/DXA RESULTS DOCUMENT: HCPCS | Performed by: INTERNAL MEDICINE

## 2021-12-14 PROCEDURE — G0439 PPPS, SUBSEQ VISIT: HCPCS | Performed by: INTERNAL MEDICINE

## 2021-12-14 PROCEDURE — G8484 FLU IMMUNIZE NO ADMIN: HCPCS | Performed by: INTERNAL MEDICINE

## 2021-12-14 PROCEDURE — 1123F ACP DISCUSS/DSCN MKR DOCD: CPT | Performed by: INTERNAL MEDICINE

## 2021-12-14 RX ORDER — LEVOTHYROXINE SODIUM 0.05 MG/1
TABLET ORAL
Qty: 90 TABLET | Refills: 1 | Status: SHIPPED | OUTPATIENT
Start: 2021-12-14 | End: 2022-03-07 | Stop reason: SDUPTHER

## 2021-12-14 RX ORDER — GREEN TEA/HOODIA GORDONII 315-12.5MG
1 CAPSULE ORAL 2 TIMES DAILY
Qty: 60 TABLET | Refills: 0 | Status: SHIPPED | OUTPATIENT
Start: 2021-12-14 | End: 2022-01-13

## 2021-12-14 ASSESSMENT — PATIENT HEALTH QUESTIONNAIRE - PHQ9
2. FEELING DOWN, DEPRESSED OR HOPELESS: 0
SUM OF ALL RESPONSES TO PHQ QUESTIONS 1-9: 0
SUM OF ALL RESPONSES TO PHQ9 QUESTIONS 1 & 2: 0
SUM OF ALL RESPONSES TO PHQ QUESTIONS 1-9: 0
SUM OF ALL RESPONSES TO PHQ QUESTIONS 1-9: 0
1. LITTLE INTEREST OR PLEASURE IN DOING THINGS: 0

## 2021-12-14 ASSESSMENT — ENCOUNTER SYMPTOMS
ABDOMINAL DISTENTION: 0
COUGH: 0
BLOOD IN STOOL: 0
DIARRHEA: 1
SHORTNESS OF BREATH: 0
SORE THROAT: 0
NAUSEA: 0
ABDOMINAL PAIN: 0
VOMITING: 0

## 2021-12-14 ASSESSMENT — LIFESTYLE VARIABLES: HOW OFTEN DO YOU HAVE A DRINK CONTAINING ALCOHOL: 0

## 2021-12-14 NOTE — PROGRESS NOTES
Medicare Annual Wellness Visit  Name: Malvin Jaffe Date: 2021   MRN: [id number] Sex: Female   Age: 74 y.o. Ethnicity:  /    : 1955 Race: Other      Xenia Guido is here for Medicare AWV    Screenings for behavioral, psychosocial and functional/safety risks, and cognitive dysfunction are all negative except as indicated below. These results, as well as other patient data from the [address] [facility] by [facility] form, are documented in Flowsheets linked to this Encounter. No Known Allergies      Prior to Visit Medications    Medication Sig Taking? Authorizing Provider   levothyroxine (SYNTHROID) 50 MCG tablet take 1 tablet by mouth once daily Yes Wander Horn MD   rivaroxaban (XARELTO) 20 MG TABS tablet take 1 tablet by mouth every morning with breakfast Yes Wander Horn MD   dilTIAZem (CARDIZEM) 30 MG tablet take 1 tablet by mouth twice a day Yes Wander Horn MD   Probiotic Acidophilus (FLORANEX) TABS Take 1 tablet by mouth 2 times daily Yes Wander Horn MD   docusate sodium (COLACE) 100 MG capsule take 1 capsule by mouth daily Yes Wander Horn MD   omeprazole (PRILOSEC) 20 MG delayed release capsule take 1 capsule by mouth once daily Yes Wander Horn MD   clobetasol (TEMOVATE) 0.05 % cream Using a thin layer daily as needed for itching.   Not to exceed 6 weeks of continuous use Yes Rosie Chapin MD   lidocaine (XYLOCAINE) 5 % ointment Apply topically as needed every 2 hours Yes Rosie Chapin MD   hydrocortisone (ANUSOL-HC) 25 MG suppository Place 1 suppository rectally every 12 hours Yes Wander Horn MD   hydrocortisone (ANUSOL-HC) 2.5 % CREA rectal cream Place rectally 2 times daily Yes Wander Horn MD   diclofenac sodium (VOLTAREN) 1 % GEL Apply topically 2 times daily Yes Wander Horn MD   conjugated estrogens (PREMARIN) 0.625 MG/GM vaginal cream Apply 2 gram intravaginally once a day at bed time x 2 wks  And then, 1 gram once a day x 7 days as needed Yes Madhuri Blank MD   phenylephrine-mineral oil-petrolatum (HEMORRHOIDAL) 0.25-14-74.9 % rectal ointment Place rectally 2 times daily as needed for Hemorrhoids Yes Madhuri Blank MD   albuterol (PROVENTIL) (2.5 MG/3ML) 0.083% nebulizer solution Take 3 mLs by nebulization every 6 hours as needed for Wheezing Yes Madhuri Blank MD   fluticasone-salmeterol (ADVAIR HFA) 115-21 MCG/ACT inhaler Inhale 2 puffs into the lungs 2 times daily Yes Madhuri Blank MD         Past Medical History:   Diagnosis Date    Arthritis     Asthma     Chickenpox     Hypertension     Hypothyroidism     Measles     Mumps     Osteoarthritis     Smallpox     Ulcer     Ulcer     Whooping cough        Past Surgical History:   Procedure Laterality Date    CHOLECYSTECTOMY, LAPAROSCOPIC N/A 3/4/2019    CHOLECYSTECTOMY LAPAROSCOPIC performed by Vera Hanna MD at [address] [place] [address],[address]  07/20/2015    [address] MD    HYSTERECTOMY  2006    Vaginal hysterectomy dr Natalya Dodd  07/20/2015    Vinita uGevara MD         Family History   Problem Relation Age of Onset    Diabetes Father        CareTeam (Including outside providers/suppliers regularly involved in providing care):   Patient Care Team:  Madhuri Blank MD as PCP - General (Hospitalist)  Pauline Jackson MD as PCP - OBGYN (Obstetrics & Gynecology)  Madhuri Blank MD as PCP - [facility] Empaneled Provider  Vera Hanna MD as Surgeon (General Surgery)    Wt Readings from Last 3 Encounters:   12/14/21 193 lb (87.5 kg)   12/09/21 194 lb (88 kg)   09/21/21 194 lb (88 kg)     Vitals:    12/14/21 1420   BP: 126/80   Pulse: 83   Resp: 18   Temp: 97.2 °F (36.2 °C)   TempSrc: Temporal   SpO2: 98%   Weight: 193 lb (87.5 kg)   Height: 5' 6\" (1.676 m)     Body mass index is 31.15 kg/m². Based upon direct observation of the patient, evaluation of cognition reveals recent memory intact, remote memory impaired.     General Appearance: alert and oriented to person, place and time, well developed and well- nourished, in no acute distress  Skin: warm and dry, no rash or erythema  Head: normocephalic and atraumatic  Eyes: pupils equal, round, and reactive to light, extraocular eye movements intact, conjunctivae normal  ENT: tympanic membrane, external ear and ear canal normal bilaterally, nose without deformity, nasal mucosa and turbinates normal without polyps  Neck: supple and non-tender without mass, no thyromegaly or thyroid nodules, no cervical lymphadenopathy  Pulmonary/Chest: clear to auscultation bilaterally- no wheezes, rales or rhonchi, normal air movement, no respiratory distress  Cardiovascular: normal rate, regular rhythm, normal S1 and S2, no murmurs, rubs, clicks, or gallops, distal pulses intact  Abdomen: soft, non-tender, non-distended, normal bowel sounds  Extremities: no cyanosis, clubbing or edema  Musculoskeletal: normal range of motion, no joint swelling, deformity or tenderness  Neurologic: reflexes normal and symmetric, no cranial nerve deficit, gait, coordination and speech normal    Patient's complete Health Risk Assessment and screening values have been reviewed and are found in Flowsheets. The following problems were reviewed today and where indicated follow up appointments were made and/or referrals ordered. Positive Risk Factor Screenings with Interventions:     Fall Risk:  2 or more falls in past year?: (!) yes  Fall with injury in past year?: no  Fall Risk Interventions:    · Home safety tips provided  · Patient declines any further evaluation/treatment for this issue  · declined using a cane   · Suggested PT and she is going to think about it    Cognitive:   Words recalled: 0 Words Recalled  Clock Drawing Test (CDT) Score: (!) Abnormal  Total Score Interpretation: Positive Mini-Cog  Did the patient refuse to take the cognition test?: No  Cognitive Impairment Interventions:  · Patient declines any further evaluation/treatment for cognitive impairment  · declined referral to neurology         General Health and ACP:  General  In general, how would you say your health is?: Good  In the past 7 days, have you experienced any of the following? New or Increased Pain, New or Increased Fatigue, Loneliness, Social Isolation, Stress or Anger?: (!) Stress  Do you get the social and emotional support that you need?: Yes  Do you have a Living Will?: (!) No  Advance Directives     Power of [address] Will ACP-Advance Directive ACP-Power of     Not on File Not on File Not on File Not on File      General Health Risk Interventions:  · Stress: patient declines any further evaluation/treatment for this issue  · No Living Will: Patient declines ACP discussion/assistance    Health Habits/Nutrition:  Health Habits/Nutrition  Do you exercise for at least 20 minutes 2-3 times per week?: Yes  Have you lost any weight without trying in the past 3 months?: (!) Yes  Do you eat only one meal per day?: (!) Yes  Have you seen the dentist within the past year?: Yes  Body mass index: (!) 31.15  Health Habits/Nutrition Interventions:  · Nutritional issues:  lost weight due to diarrhea.  ion general has a good healthy diet       Personalized Preventive Plan   Current Health Maintenance Status  Immunization History   Administered Date(s) Administered    COVID-19, Gauri April, Primary or Immunocompromised, PF, 100mcg/0.5mL 03/09/2021, 04/09/2021    Influenza Virus Vaccine 11/20/2018    Influenza, Nilesh Shena, IM, (6 mo and older Fluzone, Flulaval, Fluarix and 3 yrs and older Afluria) 11/20/2018    Influenza, Quadv, IM, PF (6 mo and older Fluzone, Flulaval, Fluarix, and 3 yrs and older Afluria) 12/21/2020    Pneumococcal Conjugate 13-valent (Hugo Leaks) 03/26/2018, 11/20/2018    Pneumococcal Polysaccharide ([id number]) 09/21/2021        Health Maintenance   Topic Date Due    Annual Wellness Visit (AWV)  08/04/2021    COVID-19 Vaccine (3 - Booster for Vivien April series) 10/09/2021    DTaP/Tdap/Td vaccine (1 - Tdap) 12/30/2021 (Originally 5/27/1972)    Flu vaccine (1) 12/09/2022 (Originally 9/1/2021)    Shingles Vaccine (1 of 2) 12/09/2022 (Originally 5/27/2003)    TSH testing  01/30/2022    Diabetes screen  07/08/2022    Breast cancer screen  10/07/2023    Colon cancer screen colonoscopy  07/20/2025    Lipid screen  01/30/2026    DEXA (modify frequency per FRAX score)  Completed    Pneumococcal 65+ years Vaccine  Completed    Hepatitis C screen  Addressed    Hepatitis A vaccine  Aged Out    Hepatitis B vaccine  Aged Out    Hib vaccine  Aged Out    Meningococcal (ACWY) vaccine  Aged Out     Recommendations for [employer] Due: see orders and patient instructions/AVS.  . Recommended screening schedule for the next 5-10 years is provided to the patient in written form: see Patient Keya García was seen today for medicare aw.     Diagnoses and all orders for this visit:    Diarrhea, unspecified type  -     Probiotic Acidophilus (FLORANEX) TABS; Take 1 tablet by mouth 2 times daily    Acquired hypothyroidism  -     levothyroxine (SYNTHROID) 50 MCG tablet; take 1 tablet by mouth once daily    Atrial fibrillation, unspecified type (HCC)  -     rivaroxaban (XARELTO) 20 MG TABS tablet; take 1 tablet by mouth every morning with breakfast    Paroxysmal atrial fibrillation (HCC)  -     dilTIAZem (CARDIZEM) 30 MG tablet; take 1 tablet by mouth twice a day

## 2021-12-14 NOTE — PROGRESS NOTES
HPI Notes    Name: Anjana Wong  : 1955         Chief Complaint:     Chief Complaint   Patient presents with    Medicare AWV       History of Present Illness:        Basilio Joya presents to office to follow up for AWV and also c/o diarrhea   She is accompanied by her daughter Patricio Chatman who helps with Mongolian interpretation   She was seen in office on  for abdominal crams and diarrhea. She also had UTI. Was empirically started on Keflex for UTI and Flagyl for diarrhea. Labs were ok. Stool specimen was rejected due to inappropriate sample for testing  States feels better but every time eats everything goes through. Not on stool softeners. Has no fever or chills. Abdominal  pain resolved          Past Medical History:     Past Medical History:   Diagnosis Date    Arthritis     Asthma     Chickenpox     Hypertension     Hypothyroidism     Measles     Mumps     Osteoarthritis     Smallpox     Ulcer     Ulcer     Whooping cough       Reviewed all health maintenance requirements and orderedappropriate tests  Health Maintenance Due   Topic Date Due    Annual Wellness Visit (AWV)  2021    COVID-19 Vaccine (3 - Booster for Qureshi Ser series) 10/09/2021       Past Surgical History:     Past Surgical History:   Procedure Laterality Date    CHOLECYSTECTOMY, LAPAROSCOPIC N/A 3/4/2019    CHOLECYSTECTOMY LAPAROSCOPIC performed by Gildardo Pruitt MD at [address] [facility]  2015    Sonal Gooden MD    HYSTERECTOMY  2006    Vaginal hysterectomy dr Eduardo Aguilar  2015    Sonal Gooden MD        Medications:       Prior to Admission medications    Medication Sig Start Date End Date Taking?  Authorizing Provider   levothyroxine (SYNTHROID) 50 MCG tablet take 1 tablet by mouth once daily 21  Yes Myles Parham MD   rivaroxaban (XARELTO) 20 MG TABS tablet take 1 tablet by mouth every morning with breakfast 21  Yes Myles Parham MD   dilTIAZem (CARDIZEM) 30 MG tablet take 1 tablet by mouth twice a day 12/14/21  Yes Xavier Corrigan MD   Pro[facility]otic [facility] TABS Take 1 tablet by mouth 2 times daily 12/14/21 1/13/22 Yes Xavier Corrigan MD   docusate sodium (COLACE) 100 MG capsule take 1 capsule by mouth daily 10/4/21  Yes Xavier Corrigan MD   omeprazole (PRILOSEC) 20 MG delayed release capsule take 1 capsule by mouth once daily 9/21/21  Yes Xavier Corrigan MD   clobetasol (TEMOVATE) 0.05 % cream Using a thin layer daily as needed for itching. Not to exceed 6 weeks of continuous use 9/21/21  Yes Barrett Desouza MD   lidocaine (XYLOCAINE) 5 % ointment Apply topically as needed every 2 hours 9/21/21  Yes Barrett Desouza MD   hydrocortisone (ANUSOL-HC) 25 MG suppository Place 1 suppository rectally every 12 hours 6/21/21  Yes Xavier Corrigan MD   hydrocortisone (ANUSOL-HC) 2.5 % CREA rectal cream Place rectally 2 times daily 6/21/21  Yes Xavier Corrigna MD   diclofenac sodium (VOLTAREN) 1 % GEL Apply topically 2 times daily 3/22/21  Yes Xavier Corrigan MD   conjugated estrogens (PREMARIN) 0.625 MG/GM vaginal cream Apply 2 gram intravaginally once a day at bed time x 2 wks  And then, 1 gram once a day x 7 days as needed 12/21/20  Yes Xavier Corrigan MD   phenylephrine-mineral oil-petrolatum (HEMORRHOIDAL) 0.25-14-74.9 % rectal ointment Place rectally 2 times daily as needed for Hemorrhoids 6/15/20  Yes Xavier Corrigan MD   albuterol (PROVENTIL) (2.5 MG/3ML) 0.083% nebulizer solution Take 3 mLs by nebulization every 6 hours as needed for Wheezing 4/13/20  Yes Xavier Corrigan MD   fluticasone-salmeterol (ADVAIR HFA) 594-60 MCG/ACT inhaler Inhale 2 puffs into the lungs 2 times daily 1/10/20  Yes Xavier Corrigan MD        Allergies:       Patient has no known allergies. Social History:     Tobacco: reports that she has never smoked. She has never used smokeless tobacco.  Alcohol:      reports no history of alcohol use.   Drug Use:  reports no history of drug use. Family History:     Family History   Problem Relation Age of Onset    Diabetes Father        Review of Systems:         Review of Systems   Constitutional: Negative for chills and fever. HENT: Negative for congestion and sore throat. Respiratory: Negative for cough and shortness of breath. Cardiovascular: Negative for chest pain and palpitations. Gastrointestinal: Positive for diarrhea. Negative for abdominal distention, abdominal pain, blood in stool, nausea and vomiting. Genitourinary: Negative for dysuria. Skin: Negative for rash. Neurological: Negative for headaches. Psychiatric/Behavioral: The patient is not nervous/anxious. Physical Exam:     Vitals:  /80   Pulse 83   Temp 97.2 °F (36.2 °C) (Temporal)   Resp 18   Ht 5' 6\" (1.676 m)   Wt 193 lb (87.5 kg)   SpO2 98%   BMI 31.15 kg/m²       Physical Exam  Vitals reviewed. Constitutional:       General: She is not in acute distress. Appearance: She is well-developed. HENT:      Head: Normocephalic and atraumatic. Neck:      Thyroid: No thyromegaly. Cardiovascular:      Rate and Rhythm: Normal rate and regular rhythm. Heart sounds: Normal heart sounds. No murmur heard. Pulmonary:      Effort: Pulmonary effort is normal.      Breath sounds: Normal breath sounds. No wheezing or rales. Abdominal:      General: Bowel sounds are normal. There is no distension. Palpations: Abdomen is soft. There is no mass. Tenderness: There is no abdominal tenderness. Musculoskeletal:         General: Normal range of motion. Lymphadenopathy:      Cervical: No cervical adenopathy. Skin:     General: Skin is warm and dry. Findings: No rash. Neurological:      Mental Status: She is alert and oriented to person, place, and time.    Psychiatric:         Behavior: Behavior normal.         Judgment: Judgment normal.               Data:     Lab Results   Component Value Date     12/09/2021    K 4.2 12/09/2021     12/09/2021    CO2 28 12/09/2021    BUN 12 12/09/2021    CREATININE 0.69 12/09/2021    GLUCOSE 102 12/09/2021    PROT 7.8 12/09/2021    LABALBU 4.0 12/09/2021    BILITOT 0.28 12/09/2021    ALKPHOS 106 12/09/2021    AST 13 12/09/2021    ALT 13 12/09/2021     Lab Results   Component Value Date    WBC 6.8 12/09/2021    RBC 4.96 12/09/2021    HGB 14.1 12/09/2021    HCT 42.8 12/09/2021    MCV 86.3 12/09/2021    MCH 28.3 12/09/2021    MCHC 32.8 12/09/2021    RDW 14.3 12/09/2021     12/09/2021    MPV NOT REPORTED 12/09/2021     Lab Results   Component Value Date    TSH 2.94 01/30/2021     Lab Results   Component Value Date    CHOL 156 01/30/2021    HDL 57 01/30/2021    LABA1C 5.6 07/08/2019          Assessment & Plan        Diagnosis Orders   1. Diarrhea, unspecified type   Suggested Lactobacillus, avoid high fiber diet,OTC Imodium prn. Good hydration encouraged. Follow up if no improvement      Probiotic Acidophilus (FLORANEX) TABS   2. Acquired hypothyroidism  levothyroxine (SYNTHROID) 50 MCG tablet   3. Atrial fibrillation, unspecified type (HCC)  rivaroxaban (XARELTO) 20 MG TABS tablet   4. Paroxysmal atrial fibrillation (HCC)  dilTIAZem (CARDIZEM) 30 MG tablet                   Completed Refills   Requested Prescriptions     Signed Prescriptions Disp Refills    levothyroxine (SYNTHROID) 50 MCG tablet 90 tablet 1     Sig: take 1 tablet by mouth once daily    rivaroxaban (XARELTO) 20 MG TABS tablet 90 tablet 1     Sig: take 1 tablet by mouth every morning with breakfast    dilTIAZem (CARDIZEM) 30 MG tablet 180 tablet 1     Sig: take 1 tablet by mouth twice a day    Probiotic Acidophilus (FLORANEX) TABS 60 tablet 0     Sig: Take 1 tablet by mouth 2 times daily     No follow-ups on file.      Orders Placed This Encounter   Medications    levothyroxine (SYNTHROID) 50 MCG tablet     Sig: take 1 tablet by mouth once daily     Dispense:  90 tablet     Refill:  1    rivaroxaban (XARELTO) 20 MG TABS tablet     Sig: take 1 tablet by mouth every morning with breakfast     Dispense:  90 tablet     Refill:  1    dilTIAZem (CARDIZEM) 30 MG tablet     Sig: take 1 tablet by mouth twice a day     Dispense:  180 tablet     Refill:  1    Probiotic Acidophilus (FLORANEX) TABS     Sig: Take 1 tablet by mouth 2 times daily     Dispense:  60 tablet     Refill:  0     No orders of the defined types were placed in this encounter. There are no Patient Instructions on file for this visit.     Electronically signed by Lauryn Andersen MD on 12/14/2021 at 2:50 PM           Completed Refills      Requested Prescriptions     Signed Prescriptions Disp Refills    levothyroxine (SYNTHROID) 50 MCG tablet 90 tablet 1     Sig: take 1 tablet by mouth once daily    rivaroxaban (XARELTO) 20 MG TABS tablet 90 tablet 1     Sig: take 1 tablet by mouth every morning with breakfast    dilTIAZem (CARDIZEM) 30 MG tablet 180 tablet 1     Sig: take 1 tablet by mouth twice a day    Probiotic Acidophilus (FLORANEX) TABS 60 tablet 0     Sig: Take 1 tablet by mouth 2 times daily

## 2021-12-14 NOTE — PATIENT INSTRUCTIONS
SURVEY:    You may be receiving a survey from [employer] regarding your visit today. Please complete the survey to enable us to provide the highest quality of care to you and your family. If you cannot score us a very good on any question, please call the office to discuss how we could have made your experience a very good one. Thank you. MD Stacie West, COLT Bar[place], [place]  Liliana Sears, PCA    Personalized Preventive Plan for Ade Marrero - 12/14/2021  Medicare offers a range of preventive health benefits. Some of the tests and screenings are paid in full while other may be subject to a deductible, co-insurance, and/or copay. Some of these benefits include a comprehensive review of your medical history including lifestyle, illnesses that may run in your family, and various assessments and screenings as appropriate. After reviewing your medical record and screening and assessments performed today your provider may have ordered immunizations, labs, imaging, and/or referrals for you. A list of these orders (if applicable) as well as your Preventive Care list are included within your After Visit Summary for your review. Other Preventive Recommendations:    · A preventive eye exam performed by an eye specialist is recommended every 1-2 years to screen for glaucoma; cataracts, macular degeneration, and other eye disorders. · A preventive dental visit is recommended every 6 months. · Try to get at least 150 minutes of exercise per week or 10,000 steps per day on a pedometer . · Order or download the FREE \"Exercise & Physical Activity: Your Everyday Guide\" from [employer]. Call 2-283.356.5332 or search [employer] online. · You need 0173-0789 mg of calcium and 1632-9568 IU of vitamin D per day.  It is possible to meet your calcium requirement with diet alone, but a vitamin D supplement is usually necessary to meet this goal.  · When exposed to the sun, use a sunscreen that protects against both UVA and UVB radiation with an SPF of 30 or greater. Reapply every 2 to 3 hours or after sweating, drying off with a towel, or swimming. · Always wear a seat belt when traveling in a car. Always wear a helmet when riding a bicycle or motorcycle.

## 2022-01-03 ENCOUNTER — TELEPHONE (OUTPATIENT)
Dept: FAMILY MEDICINE CLINIC | Age: 67
End: 2022-01-03

## 2022-01-03 NOTE — TELEPHONE ENCOUNTER
LM to advise patient of overdue order for breast US. Advised patient to please have this completed and that I would extend the order out for 2 weeks.

## 2022-01-10 ENCOUNTER — HOSPITAL ENCOUNTER (OUTPATIENT)
Dept: PREADMISSION TESTING | Age: 67
Setting detail: SPECIMEN
Discharge: HOME | End: 2022-01-10
Payer: MEDICARE

## 2022-01-10 DIAGNOSIS — R05.9 COUGH: ICD-10-CM

## 2022-01-10 DIAGNOSIS — R05.9 COUGH: Primary | ICD-10-CM

## 2022-01-10 PROCEDURE — U0005 INFEC AGEN DETEC AMPLI PROBE: HCPCS

## 2022-01-10 PROCEDURE — C9803 HOPD COVID-19 SPEC COLLECT: HCPCS

## 2022-01-10 PROCEDURE — U0003 INFECTIOUS AGENT DETECTION BY NUCLEIC ACID (DNA OR RNA); SEVERE ACUTE RESPIRATORY SYNDROME CORONAVIRUS 2 (SARS-COV-2) (CORONAVIRUS DISEASE [COVID-19]), AMPLIFIED PROBE TECHNIQUE, MAKING USE OF HIGH THROUGHPUT TECHNOLOGIES AS DESCRIBED BY CMS-2020-01-R: HCPCS

## 2022-01-11 LAB
SARS-COV-2: ABNORMAL
SARS-COV-2: DETECTED
SOURCE: ABNORMAL

## 2022-01-19 ENCOUNTER — HOSPITAL ENCOUNTER (OUTPATIENT)
Age: 67
Discharge: HOME | End: 2022-01-21
Payer: MEDICARE

## 2022-01-19 ENCOUNTER — HOSPITAL ENCOUNTER (OUTPATIENT)
Dept: GENERAL RADIOLOGY | Age: 67
Discharge: HOME | End: 2022-01-21
Payer: MEDICARE

## 2022-01-19 ENCOUNTER — HOSPITAL ENCOUNTER (OUTPATIENT)
Age: 67
Discharge: HOME | End: 2022-01-19
Payer: MEDICARE

## 2022-01-19 DIAGNOSIS — I48.0 PAROXYSMAL ATRIAL FIBRILLATION (HCC): ICD-10-CM

## 2022-01-19 DIAGNOSIS — E55.9 VITAMIN D DEFICIENCY, UNSPECIFIED: ICD-10-CM

## 2022-01-19 DIAGNOSIS — E03.9 HYPOTHYROIDISM, UNSPECIFIED TYPE: ICD-10-CM

## 2022-01-19 DIAGNOSIS — Z87.19 HX OF GASTROESOPHAGEAL REFLUX (GERD): ICD-10-CM

## 2022-01-19 DIAGNOSIS — J45.901 ASTHMA WITH ACUTE EXACERBATION, UNSPECIFIED ASTHMA SEVERITY, UNSPECIFIED WHETHER PERSISTENT: ICD-10-CM

## 2022-01-19 LAB
ABSOLUTE EOS #: 0 K/UL (ref 0–0.4)
ABSOLUTE IMMATURE GRANULOCYTE: ABNORMAL K/UL (ref 0–0.3)
ABSOLUTE LYMPH #: 1.3 K/UL (ref 1–4.8)
ABSOLUTE MONO #: 0.6 K/UL (ref 0–1)
ALBUMIN SERPL-MCNC: 3.8 G/DL (ref 3.5–5.2)
ALBUMIN/GLOBULIN RATIO: ABNORMAL (ref 1–2.5)
ALP BLD-CCNC: 88 U/L (ref 35–104)
ALT SERPL-CCNC: 28 U/L (ref 5–33)
ANION GAP SERPL CALCULATED.3IONS-SCNC: 10 MMOL/L (ref 9–17)
AST SERPL-CCNC: 16 U/L
BASOPHILS # BLD: 0 % (ref 0–2)
BASOPHILS ABSOLUTE: 0 K/UL (ref 0–0.2)
BILIRUB SERPL-MCNC: 0.42 MG/DL (ref 0.3–1.2)
BUN BLDV-MCNC: 13 MG/DL (ref 8–23)
BUN/CREAT BLD: 17 (ref 9–20)
CALCIUM SERPL-MCNC: 9.3 MG/DL (ref 8.6–10.4)
CHLORIDE BLD-SCNC: 101 MMOL/L (ref 98–107)
CO2: 22 MMOL/L (ref 20–31)
CREAT SERPL-MCNC: 0.77 MG/DL (ref 0.5–0.9)
DIFFERENTIAL TYPE: YES
EKG ATRIAL RATE: 71 BPM
EKG P AXIS: 73 DEGREES
EKG P-R INTERVAL: 160 MS
EKG Q-T INTERVAL: 384 MS
EKG QRS DURATION: 106 MS
EKG QTC CALCULATION (BAZETT): 417 MS
EKG R AXIS: 76 DEGREES
EKG T AXIS: 76 DEGREES
EKG VENTRICULAR RATE: 71 BPM
EOSINOPHILS RELATIVE PERCENT: 0 % (ref 0–5)
GFR AFRICAN AMERICAN: >60 ML/MIN
GFR NON-AFRICAN AMERICAN: >60 ML/MIN
GFR SERPL CREATININE-BSD FRML MDRD: ABNORMAL ML/MIN/{1.73_M2}
GFR SERPL CREATININE-BSD FRML MDRD: ABNORMAL ML/MIN/{1.73_M2}
GLUCOSE BLD-MCNC: 104 MG/DL (ref 70–99)
HCT VFR BLD CALC: 43.7 % (ref 36–46)
HEMOGLOBIN: 14.5 G/DL (ref 12–16)
IMMATURE GRANULOCYTES: ABNORMAL %
LYMPHOCYTES # BLD: 21 % (ref 15–40)
MAGNESIUM: 2.2 MG/DL (ref 1.6–2.6)
MCH RBC QN AUTO: 28.4 PG (ref 26–34)
MCHC RBC AUTO-ENTMCNC: 33.1 G/DL (ref 31–37)
MCV RBC AUTO: 85.7 FL (ref 80–100)
MONOCYTES # BLD: 9 % (ref 4–8)
NRBC AUTOMATED: ABNORMAL PER 100 WBC
PATIENT FASTING?: YES
PDW BLD-RTO: 15.2 % (ref 12.1–15.2)
PLATELET # BLD: 443 K/UL (ref 140–450)
PLATELET ESTIMATE: ABNORMAL
PMV BLD AUTO: ABNORMAL FL (ref 6–12)
POTASSIUM SERPL-SCNC: 4.2 MMOL/L (ref 3.7–5.3)
RBC # BLD: 5.1 M/UL (ref 4–5.2)
RBC # BLD: ABNORMAL 10*6/UL
SEG NEUTROPHILS: 70 % (ref 47–75)
SEGMENTED NEUTROPHILS ABSOLUTE COUNT: 4.4 K/UL (ref 2.5–7)
SODIUM BLD-SCNC: 133 MMOL/L (ref 135–144)
TOTAL PROTEIN: 8.2 G/DL (ref 6.4–8.3)
TSH SERPL DL<=0.05 MIU/L-ACNC: 2.76 MIU/L (ref 0.3–5)
WBC # BLD: 6.3 K/UL (ref 3.5–11)
WBC # BLD: ABNORMAL 10*3/UL

## 2022-01-19 PROCEDURE — 85025 COMPLETE CBC W/AUTO DIFF WBC: CPT

## 2022-01-19 PROCEDURE — 80061 LIPID PANEL: CPT

## 2022-01-19 PROCEDURE — 84443 ASSAY THYROID STIM HORMONE: CPT

## 2022-01-19 PROCEDURE — 83735 ASSAY OF MAGNESIUM: CPT

## 2022-01-19 PROCEDURE — 80053 COMPREHEN METABOLIC PANEL: CPT

## 2022-01-19 PROCEDURE — 82306 VITAMIN D 25 HYDROXY: CPT

## 2022-01-19 PROCEDURE — 36415 COLL VENOUS BLD VENIPUNCTURE: CPT

## 2022-01-19 PROCEDURE — 71046 X-RAY EXAM CHEST 2 VIEWS: CPT

## 2022-01-19 PROCEDURE — 93010 ELECTROCARDIOGRAM REPORT: CPT | Performed by: INTERNAL MEDICINE

## 2022-01-19 PROCEDURE — 93005 ELECTROCARDIOGRAM TRACING: CPT

## 2022-01-20 LAB
CHOLESTEROL/HDL RATIO: 3.5
CHOLESTEROL: 132 MG/DL
HDLC SERPL-MCNC: 38 MG/DL
LDL CHOLESTEROL: 73 MG/DL (ref 0–130)
TRIGL SERPL-MCNC: 106 MG/DL
VITAMIN D 25-HYDROXY: 27.7 NG/ML (ref 30–100)
VLDLC SERPL CALC-MCNC: ABNORMAL MG/DL (ref 1–30)

## 2022-01-27 ENCOUNTER — OFFICE VISIT (OUTPATIENT)
Dept: CARDIOLOGY CLINIC | Age: 67
End: 2022-01-27
Payer: MEDICARE

## 2022-01-27 VITALS
DIASTOLIC BLOOD PRESSURE: 70 MMHG | SYSTOLIC BLOOD PRESSURE: 120 MMHG | WEIGHT: 187 LBS | BODY MASS INDEX: 30.18 KG/M2 | HEART RATE: 70 BPM | OXYGEN SATURATION: 95 %

## 2022-01-27 DIAGNOSIS — K21.9 GASTROESOPHAGEAL REFLUX DISEASE WITHOUT ESOPHAGITIS: ICD-10-CM

## 2022-01-27 DIAGNOSIS — E03.9 HYPOTHYROIDISM, UNSPECIFIED TYPE: ICD-10-CM

## 2022-01-27 DIAGNOSIS — I48.0 PAROXYSMAL ATRIAL FIBRILLATION (HCC): Primary | ICD-10-CM

## 2022-01-27 DIAGNOSIS — E55.9 VITAMIN D DEFICIENCY, UNSPECIFIED: ICD-10-CM

## 2022-01-27 PROCEDURE — 99214 OFFICE O/P EST MOD 30 MIN: CPT | Performed by: INTERNAL MEDICINE

## 2022-01-27 PROCEDURE — 1123F ACP DISCUSS/DSCN MKR DOCD: CPT | Performed by: INTERNAL MEDICINE

## 2022-01-27 PROCEDURE — 1036F TOBACCO NON-USER: CPT | Performed by: INTERNAL MEDICINE

## 2022-01-27 PROCEDURE — G8484 FLU IMMUNIZE NO ADMIN: HCPCS | Performed by: INTERNAL MEDICINE

## 2022-01-27 PROCEDURE — G8399 PT W/DXA RESULTS DOCUMENT: HCPCS | Performed by: INTERNAL MEDICINE

## 2022-01-27 PROCEDURE — G8427 DOCREV CUR MEDS BY ELIG CLIN: HCPCS | Performed by: INTERNAL MEDICINE

## 2022-01-27 PROCEDURE — 4040F PNEUMOC VAC/ADMIN/RCVD: CPT | Performed by: INTERNAL MEDICINE

## 2022-01-27 PROCEDURE — G8417 CALC BMI ABV UP PARAM F/U: HCPCS | Performed by: INTERNAL MEDICINE

## 2022-01-27 PROCEDURE — 3017F COLORECTAL CA SCREEN DOC REV: CPT | Performed by: INTERNAL MEDICINE

## 2022-01-27 PROCEDURE — 1090F PRES/ABSN URINE INCON ASSESS: CPT | Performed by: INTERNAL MEDICINE

## 2022-01-27 RX ORDER — OMEPRAZOLE 20 MG/1
CAPSULE, DELAYED RELEASE ORAL
Qty: 90 CAPSULE | Refills: 3 | Status: SHIPPED | OUTPATIENT
Start: 2022-01-27 | End: 2022-04-27 | Stop reason: SDUPTHER

## 2022-01-27 NOTE — PATIENT INSTRUCTIONS
No changes    Follow up in one year      SURVEY:    You may be receiving a survey from [email or web address] regarding your visit today. Please complete the survey to enable us to provide the highest quality of care to you and your family. If you cannot score us a very good on any question, please call the office to discuss how we could have made your experience a very good one. Thank you.     [phone]

## 2022-01-27 NOTE — LETTER
Juany Golden M.D. [address] [place] [address] Edwar Velez   [phone]        2022        Melo Venegas MD  [address] [facility], [address] [facility]    RE:   Shaggy Anderson  :  1955    Dear Dr. Rosario Rossi:    CHIEF COMPLAINT:  1. Paroxysmal atrial fibrillation. 2.  On Xarelto. HISTORY OF PRESENT ILLNESS:  I had the pleasure of seeing Mrs. Emeterio Bee in the office on 2022. She is a very pleasant 78-year-old female who speaks very little [place]. I see her and her  together. Her son, Nohemi Chua, or her daughter, Brice Claude, usually comes to translate. Today, Nohemi Chua was with them. He does an outstanding job. She has a history of atrial fibrillation with RVR discovered on 09/15/2017 when she presented to the hospital with right lower lobe pneumonia. She converted back to sinus rhythm 12 hours later and was totally asymptomatic. She has been anticoagulated since that time. An echocardiogram showed an EF of 55% with moderate-to-severe LVH. She had a normal stress test.    She had a cholecystectomy in 2019 and she has had pain in her right flank since that time, although it seems to be better. She has never had a myocardial infarction or cardiac catheterization. She has had no chest pain or chest discomfort. She did have COVID 22 days ago. She had chest pain and fever with her COVID and does have some shortness of breath, but this is resolving. She has had no PND, orthopnea or pedal edema. No syncope or near syncope. Her son confirmed that she is doing well. CARDIAC RISK FACTORS:  Hypertension:  Positive. Other Family Members:  Positive. Peripheral Vascular Disease:  Negative. Diabetes:  Negative. Smoking:  Negative. Hyperlipidemia:  Negative.     MEDICATIONS AT THIS TIME:  She is on albuterol p.r.n., Premarin 0.625 daily, Cardizem 30 mg b.i.d., Synthroid 50 mcg daily, Prilosec 20 mg daily, Xarelto 20 mg daily. PAST MEDICAL AND SURGICAL HISTORY:  1. Peptic ulcer disease. 2.  Euthyroid, on treatment. 3.  Hypertension. 4.  Asthma. 5.  Cholecystectomy in 03/2019.  6.  Paroxysmal atrial fibrillation. No known atrial fibrillation episodes since 09/15/2017. FAMILY HISTORY:  Mother had diabetes and CAD with a pacemaker. Father had diabetes. SOCIAL HISTORY:  She is 74years old, , 5 children. Saritha Sandoval has his grocery store in [place]. Saritha Snadoval has a boy, 13, and a girl, 15. Mrs. Moriah Mendoza has 2 daughters, Sara Nixon, who is her older daughter, and Valentina Mcdaniel, her younger daughter, who is an  at the [address] [place] [address]. Another son is an  at the [employer] another son has a home in [place] and builds [employer], and Saritha Sandoval has a grocery store here. She does not smoke or drink alcohol. She overall is doing well. REVIEW OF SYSTEMS:  Cardiac as above. Other systems reviewed including constitutional, eyes, ears, nose and throat, cardiovascular, respiratory, GI, , musculoskeletal, integumentary, neurologic, endocrine, hematologic and allergic/immunologic, are negative except for what is described above. No weight loss or weight gain. No change in bowel habits. No blood in stools. No fevers, sweats or chills. PHYSICAL EXAMINATION:  VITAL SIGNS:  Her blood pressure was 120/70 with a heart rate of 70 and regular. Respiratory rate 18. O2 saturation was 95%. Weight 187 pounds. GENERAL:  She is a pleasant 69-year-old female. Denied pain. She was oriented to person, place and time. Answered questions appropriately. SKIN:  No unusual skin changes. HEENT:  The pupils are equally round and reactive to light and accommodation. Extraocular movements were intact. Mucous membranes were dry. NECK:  No JVD. Good carotid pulses. No carotid bruits. No lymphadenopathy or thyromegaly. CARDIOVASCULAR EXAM:  S1 and S2 were normal.  No S3 or S4.   Soft systolic blowing type murmur. No diastolic murmur. PMI was normal.  No lift, thrust, or pericardial friction rub. LUNGS:  Quite clear to auscultation and percussion. ABDOMEN:  Soft and nontender. Good bowel sounds. EXTREMITIES:  Good femoral pulses. Good pedal pulses. No pedal edema. Skin was warm and dry. No calf tenderness. Nail beds pink. Good cap refill. PULSES:  Bilateral symmetrical radial, brachial and carotid pulses. No carotid bruits. Good femoral and pedal pulses. NEUROLOGIC EXAM:  Within normal limits. PSYCHIATRIC EXAM:  Within normal limits. LABORATORY DATA:  From 01/19/2022, sodium 133, potassium 4.2, BUN 13, creatinine 0.77, GFR greater than 60. Magnesium 2.2. Glucose 104. Cholesterol 132, HDL 38, LDL 73, triglycerides 106. ALT was 28, AST was 16. TSH 2.76. Vitamin D 27.7. White count 6.3, hemoglobin 14.5 with a platelet count 131,641. EKG showed normal sinus rhythm, was normal.    Chest x-ray still showed small patchy infiltrates on the right, which represent mild changes of COVID-19. Bedside echocardiogram confirmed normal LV size and function. IMPRESSION:  1. History of atrial fibrillation when she was hospitalized for pneumonia on 09/15/2017, with no further episodes. 2.  Anticoagulated with Xarelto because of CHADS score of 3.  3.  Normal LV function. 4.  Normal stress test.    PLAN:  1. No change in medications. 2.  Follow up in one year. DISCUSSION:  Mrs. Eleno Awad overall is doing well. She has no worrisome symptoms such as chest pain, shortness of breath or loss of energy. She did have COVID 22 days ago, but she is making a good recovery. This did not require hospitalization. She is a delight to see and her son Sera Johnston does an outstanding job with interpreting. I look forward to seeing her in one year unless a problem would develop. Thank you very much.     Sincerely,        Bam Hays    D: 01/30/2022 11:57:26     T: 01/30/2022 13:41:35     [email or web address]  Job#: [id number]   Doc#: [id number]

## 2022-01-27 NOTE — PROGRESS NOTES
Ov Dr. Taj Tillman for one year follow   No hospitalizations/procedures/er visits  Had covid 22 days ago   Had cp/fever with covid  Has sob with covid  But feels ok now   Bedside echo done         No changes    Follow up in one year      SURVEY:    You may be receiving a survey from [employer] regarding your visit today. Please complete the survey to enable us to provide the highest quality of care to you and your family. If you cannot score us a very good on any question, please call the office to discuss how we could have made your experience a very good one. Thank you.     Bibi [id number]

## 2022-02-01 NOTE — PROGRESS NOTES
Krishan Best M.D. [address] [place], [place] [address]war [address]  [phone]        2022        Brent North MD  [address] [facility], [address] [facility]    RE:   Manny Player  :  1955    Dear Dr. Sanket Presley:    CHIEF COMPLAINT:  1. Paroxysmal atrial fibrillation. 2.  On Xarelto. HISTORY OF PRESENT ILLNESS:  I had the pleasure of seeing Mrs. Chiara Bah in the office on 2022. She is a very pleasant 67-year-old female who speaks very little [place]. I see her and her  together. Her son, Charanjit Graham, or her daughter, Kenisha Jennings, usually comes to translate. Today, Charanjit Graham was with them. He does an outstanding job. She has a history of atrial fibrillation with RVR discovered on 09/15/2017 when she presented to the hospital with right lower lobe pneumonia. She converted back to sinus rhythm 12 hours later and was totally asymptomatic. She has been anticoagulated since that time. An echocardiogram showed an EF of 55% with moderate-to-severe LVH. She had a normal stress test.    She had a cholecystectomy in 2019 and she has had pain in her right flank since that time, although it seems to be better. She has never had a myocardial infarction or cardiac catheterization. She has had no chest pain or chest discomfort. She did have COVID 22 days ago. She had chest pain and fever with her COVID and does have some shortness of breath, but this is resolving. She has had no PND, orthopnea or pedal edema. No syncope or near syncope. Her son confirmed that she is doing well. CARDIAC RISK FACTORS:  Hypertension:  Positive. Other Family Members:  Positive. Peripheral Vascular Disease:  Negative. Diabetes:  Negative. Smoking:  Negative. Hyperlipidemia:  Negative.     MEDICATIONS AT THIS TIME:  She is on albuterol p.r.n., Premarin 0.625 daily, Cardizem 30 mg b.i.d., Synthroid 50 mcg daily, Prilosec 20 mg daily, Xarelto 20 mg daily. PAST MEDICAL AND SURGICAL HISTORY:  1. Peptic ulcer disease. 2.  Euthyroid, on treatment. 3.  Hypertension. 4.  Asthma. 5.  Cholecystectomy in 03/2019.  6.  Paroxysmal atrial fibrillation. No known atrial fibrillation episodes since 09/15/2017. FAMILY HISTORY:  Mother had diabetes and CAD with a pacemaker. Father had diabetes. SOCIAL HISTORY:  She is 74years old, , 5 children. Karsten Queen has his grocery store in [place]. Karsten Queen has a boy, 13, and a girl, 15. Mrs. Narinder German has 2 daughters, Warden Nuno, who is her older daughter, and Richie Shaw, her younger daughter, who is an  at the [address] [place] [address]. Another son is an  at the [employer], another son has a home in [place] and builds [employer], and Karsten Queen has a grocery store here. She does not smoke or drink alcohol. She overall is doing well. REVIEW OF SYSTEMS:  Cardiac as above. Other systems reviewed including constitutional, eyes, ears, nose and throat, cardiovascular, respiratory, GI, , musculoskeletal, integumentary, neurologic, endocrine, hematologic and allergic/immunologic, are negative except for what is described above. No weight loss or weight gain. No change in bowel habits. No blood in stools. No fevers, sweats or chills. PHYSICAL EXAMINATION:  VITAL SIGNS:  Her blood pressure was 120/70 with a heart rate of 70 and regular. Respiratory rate 18. O2 saturation was 95%. Weight 187 pounds. GENERAL:  She is a pleasant 67-year-old female. Denied pain. She was oriented to person, place and time. Answered questions appropriately. SKIN:  No unusual skin changes. HEENT:  The pupils are equally round and reactive to light and accommodation. Extraocular movements were intact. Mucous membranes were dry. NECK:  No JVD. Good carotid pulses. No carotid bruits. No lymphadenopathy or thyromegaly. CARDIOVASCULAR EXAM:  S1 and S2 were normal.  No S3 or S4.   Soft systolic blowing type murmur. No diastolic murmur. PMI was normal.  No lift, thrust, or pericardial friction rub. LUNGS:  Quite clear to auscultation and percussion. ABDOMEN:  Soft and nontender. Good bowel sounds. EXTREMITIES:  Good femoral pulses. Good pedal pulses. No pedal edema. Skin was warm and dry. No calf tenderness. Nail beds pink. Good cap refill. PULSES:  Bilateral symmetrical radial, brachial and carotid pulses. No carotid bruits. Good femoral and pedal pulses. NEUROLOGIC EXAM:  Within normal limits. PSYCHIATRIC EXAM:  Within normal limits. LABORATORY DATA:  From 01/19/2022, sodium 133, potassium 4.2, BUN 13, creatinine 0.77, GFR greater than 60. Magnesium 2.2. Glucose 104. Cholesterol 132, HDL 38, LDL 73, triglycerides 106. ALT was 28, AST was 16. TSH 2.76. Vitamin D 27.7. White count 6.3, hemoglobin 14.5 with a platelet count 766,173. EKG showed normal sinus rhythm, was normal.    Chest x-ray still showed small patchy infiltrates on the right, which represent mild changes of COVID-19. Bedside echocardiogram confirmed normal LV size and function. IMPRESSION:  1. History of atrial fibrillation when she was hospitalized for pneumonia on 09/15/2017, with no further episodes. 2.  Anticoagulated with Xarelto because of CHADS score of 3.  3.  Normal LV function. 4.  Normal stress test.    PLAN:  1. No change in medications. 2.  Follow up in one year. DISCUSSION:  Mrs. Inocencia pennington is doing well. She has no worrisome symptoms such as chest pain, shortness of breath or loss of energy. She did have COVID 22 days ago, but she is making a good recovery. This did not require hospitalization. She is a delight to see and her son Dennise Prince does an outstanding job with interpreting. I look forward to seeing her in one year unless a problem would develop. Thank you very much.     Sincerely,        Raúl Aldana    D: 01/30/2022 11:57:26     T: 01/30/2022 13:41:35     [email or web address]  Job#: [id number]   Doc#: [id number]

## 2022-03-03 ENCOUNTER — TELEPHONE (OUTPATIENT)
Dept: ADMINISTRATIVE | Age: 67
End: 2022-03-03

## 2022-03-03 NOTE — TELEPHONE ENCOUNTER
I faxed the encounter back. The medication was prescribed by Dr Nile Coronado per Dr Analia Correa, pharmacy needs to contact Dr Nile Coronado' office for the audit.

## 2022-03-07 DIAGNOSIS — E03.9 ACQUIRED HYPOTHYROIDISM: ICD-10-CM

## 2022-03-07 RX ORDER — LEVOTHYROXINE SODIUM 0.05 MG/1
TABLET ORAL
Qty: 90 TABLET | Refills: 1 | Status: SHIPPED | OUTPATIENT
Start: 2022-03-07

## 2022-04-26 DIAGNOSIS — M79.18 INTERCOSTAL MUSCLE PAIN: ICD-10-CM

## 2022-04-27 DIAGNOSIS — K21.9 GASTROESOPHAGEAL REFLUX DISEASE WITHOUT ESOPHAGITIS: ICD-10-CM

## 2022-04-27 DIAGNOSIS — M79.18 INTERCOSTAL MUSCLE PAIN: ICD-10-CM

## 2022-04-28 RX ORDER — OMEPRAZOLE 20 MG/1
CAPSULE, DELAYED RELEASE ORAL
Qty: 90 CAPSULE | Refills: 3 | Status: SHIPPED | OUTPATIENT
Start: 2022-04-28 | End: 2022-09-09 | Stop reason: ALTCHOICE

## 2022-06-14 DIAGNOSIS — I48.0 PAROXYSMAL ATRIAL FIBRILLATION (HCC): ICD-10-CM

## 2022-07-21 RX ORDER — ALBUTEROL SULFATE 2.5 MG/3ML
2.5 SOLUTION RESPIRATORY (INHALATION) EVERY 6 HOURS PRN
Qty: 120 EACH | Refills: 1 | Status: SHIPPED | OUTPATIENT
Start: 2022-07-21 | End: 2022-07-22 | Stop reason: SDUPTHER

## 2022-07-22 RX ORDER — ALBUTEROL SULFATE 2.5 MG/3ML
2.5 SOLUTION RESPIRATORY (INHALATION) EVERY 6 HOURS PRN
Qty: 120 EACH | Refills: 1 | Status: SHIPPED | OUTPATIENT
Start: 2022-07-22 | End: 2022-11-04 | Stop reason: SDUPTHER

## 2022-08-25 ENCOUNTER — OFFICE VISIT (OUTPATIENT)
Dept: FAMILY MEDICINE CLINIC | Age: 67
End: 2022-08-25
Payer: MEDICARE

## 2022-08-25 VITALS
HEIGHT: 66 IN | HEART RATE: 68 BPM | SYSTOLIC BLOOD PRESSURE: 128 MMHG | RESPIRATION RATE: 18 BRPM | BODY MASS INDEX: 31.02 KG/M2 | WEIGHT: 193 LBS | OXYGEN SATURATION: 97 % | DIASTOLIC BLOOD PRESSURE: 82 MMHG

## 2022-08-25 DIAGNOSIS — R19.7 DIARRHEA, UNSPECIFIED TYPE: ICD-10-CM

## 2022-08-25 DIAGNOSIS — F32.1 CURRENT MODERATE EPISODE OF MAJOR DEPRESSIVE DISORDER, UNSPECIFIED WHETHER RECURRENT (HCC): ICD-10-CM

## 2022-08-25 DIAGNOSIS — E03.9 HYPOTHYROIDISM, UNSPECIFIED TYPE: ICD-10-CM

## 2022-08-25 DIAGNOSIS — I10 PRIMARY HYPERTENSION: ICD-10-CM

## 2022-08-25 DIAGNOSIS — K64.9 HEMORRHOIDS, UNSPECIFIED HEMORRHOID TYPE: ICD-10-CM

## 2022-08-25 DIAGNOSIS — M53.3 SACRAL PAIN: Primary | ICD-10-CM

## 2022-08-25 DIAGNOSIS — Z91.81 AT HIGH RISK FOR FALLS: ICD-10-CM

## 2022-08-25 PROCEDURE — G8417 CALC BMI ABV UP PARAM F/U: HCPCS | Performed by: INTERNAL MEDICINE

## 2022-08-25 PROCEDURE — 99214 OFFICE O/P EST MOD 30 MIN: CPT | Performed by: INTERNAL MEDICINE

## 2022-08-25 PROCEDURE — G8427 DOCREV CUR MEDS BY ELIG CLIN: HCPCS | Performed by: INTERNAL MEDICINE

## 2022-08-25 PROCEDURE — 1036F TOBACCO NON-USER: CPT | Performed by: INTERNAL MEDICINE

## 2022-08-25 PROCEDURE — G8399 PT W/DXA RESULTS DOCUMENT: HCPCS | Performed by: INTERNAL MEDICINE

## 2022-08-25 PROCEDURE — 1090F PRES/ABSN URINE INCON ASSESS: CPT | Performed by: INTERNAL MEDICINE

## 2022-08-25 PROCEDURE — 1123F ACP DISCUSS/DSCN MKR DOCD: CPT | Performed by: INTERNAL MEDICINE

## 2022-08-25 PROCEDURE — 3017F COLORECTAL CA SCREEN DOC REV: CPT | Performed by: INTERNAL MEDICINE

## 2022-08-25 RX ORDER — LOPERAMIDE HYDROCHLORIDE 2 MG/1
2 CAPSULE ORAL 4 TIMES DAILY PRN
Qty: 40 CAPSULE | Refills: 2 | Status: SHIPPED | OUTPATIENT
Start: 2022-08-25 | End: 2022-09-04

## 2022-08-25 RX ORDER — HYDROCORTISONE 25 MG/G
CREAM TOPICAL 2 TIMES DAILY
Qty: 1 EACH | Refills: 1 | Status: SHIPPED | OUTPATIENT
Start: 2022-08-25

## 2022-08-25 RX ORDER — MELATONIN
1000 DAILY
Qty: 30 TABLET | Refills: 0 | Status: SHIPPED | OUTPATIENT
Start: 2022-08-25 | End: 2022-09-09 | Stop reason: SDUPTHER

## 2022-08-25 SDOH — ECONOMIC STABILITY: FOOD INSECURITY: WITHIN THE PAST 12 MONTHS, YOU WORRIED THAT YOUR FOOD WOULD RUN OUT BEFORE YOU GOT MONEY TO BUY MORE.: NEVER TRUE

## 2022-08-25 SDOH — ECONOMIC STABILITY: FOOD INSECURITY: WITHIN THE PAST 12 MONTHS, THE FOOD YOU BOUGHT JUST DIDN'T LAST AND YOU DIDN'T HAVE MONEY TO GET MORE.: NEVER TRUE

## 2022-08-25 ASSESSMENT — PATIENT HEALTH QUESTIONNAIRE - PHQ9
5. POOR APPETITE OR OVEREATING: 0
8. MOVING OR SPEAKING SO SLOWLY THAT OTHER PEOPLE COULD HAVE NOTICED. OR THE OPPOSITE, BEING SO FIGETY OR RESTLESS THAT YOU HAVE BEEN MOVING AROUND A LOT MORE THAN USUAL: 0
SUM OF ALL RESPONSES TO PHQ QUESTIONS 1-9: 0
3. TROUBLE FALLING OR STAYING ASLEEP: 0
10. IF YOU CHECKED OFF ANY PROBLEMS, HOW DIFFICULT HAVE THESE PROBLEMS MADE IT FOR YOU TO DO YOUR WORK, TAKE CARE OF THINGS AT HOME, OR GET ALONG WITH OTHER PEOPLE: 0
4. FEELING TIRED OR HAVING LITTLE ENERGY: 0
SUM OF ALL RESPONSES TO PHQ QUESTIONS 1-9: 0
SUM OF ALL RESPONSES TO PHQ9 QUESTIONS 1 & 2: 0
7. TROUBLE CONCENTRATING ON THINGS, SUCH AS READING THE NEWSPAPER OR WATCHING TELEVISION: 0
6. FEELING BAD ABOUT YOURSELF - OR THAT YOU ARE A FAILURE OR HAVE LET YOURSELF OR YOUR FAMILY DOWN: 0
SUM OF ALL RESPONSES TO PHQ QUESTIONS 1-9: 0
2. FEELING DOWN, DEPRESSED OR HOPELESS: 0
SUM OF ALL RESPONSES TO PHQ QUESTIONS 1-9: 0
1. LITTLE INTEREST OR PLEASURE IN DOING THINGS: 0
9. THOUGHTS THAT YOU WOULD BE BETTER OFF DEAD, OR OF HURTING YOURSELF: 0

## 2022-08-25 ASSESSMENT — ENCOUNTER SYMPTOMS
DIARRHEA: 1
SHORTNESS OF BREATH: 0
ABDOMINAL PAIN: 0
VOMITING: 0
COUGH: 0
BLOATING: 1
BLOOD IN STOOL: 0
NAUSEA: 0
SORE THROAT: 0

## 2022-08-25 ASSESSMENT — SOCIAL DETERMINANTS OF HEALTH (SDOH): HOW HARD IS IT FOR YOU TO PAY FOR THE VERY BASICS LIKE FOOD, HOUSING, MEDICAL CARE, AND HEATING?: NOT HARD AT ALL

## 2022-08-25 NOTE — PROGRESS NOTES
HPI Notes    Name: Huma Dos Santos  : 1955         Chief Complaint:     Chief Complaint   Patient presents with    Hemorrhoids     Patients pain started in tail bone went to chiropractor, Patient states she uses the bath room a lot and she has to sit on something soft not hard. History of Present Illness:        Carmelina Delcid presents to office for evaluation of hemorrhoids, tailbone pain, diarrhea , also f/o HTN, hypothyroidism    She is accompanied by her daughter who helps with Hungarian  interpretation  Patient has been having pain in her tailbone for about 2 months. Had no falls or trauma but has been walking a lot for exercising  Went to chiropractor a week ago . Had some ? Massage done to her lower back and sacral area. Did not help with pain. Was recommended to see family doctor to ghet an X-ray. Her pain is achy, worse with movements. Has been having diarrhea for several months. She thinks diarrhea is aggravating her hemorrhoids. States her bottom feels sore, burning. Reports no constipation. Reports no blood in stool. Has a history of normal colonoscopy in     Carmelina Delcid has a history of depression. States symptoms are stable now. Her mood is better. She is not on medications currently. In the past tried Celexa but stopped it because did not like side effects. She is not in counseling. She does not feel she needs any interventions for her depression at this time    Supa Mckee has a history of hypothyroidism for many years. She is compliant with her medications. Her last TSH on 2022 was normal at 2.76. She reports no increase in fatigue, weight gain, constipation or hair loss. Hypertension  This is a chronic problem. The current episode started more than 1 year ago. The problem is unchanged. The problem is controlled. Pertinent negatives include no chest pain, headaches, palpitations or shortness of breath. There are no associated agents to hypertension.  Risk factors for coronary artery disease include post-menopausal state. Past treatments include calcium channel blockers. The current treatment provides significant improvement. There are no compliance problems. There is no history of kidney disease, CAD/MI, CVA or heart failure. Diarrhea   This is a chronic problem. The current episode started more than 1 month ago. The problem occurs 5 to 10 times per day. The problem has been unchanged. The stool consistency is described as Mucous. The patient states that diarrhea does not awaken her from sleep. Associated symptoms include arthralgias and bloating. Pertinent negatives include no abdominal pain, chills, coughing, fever, headaches, vomiting or weight loss. Nothing aggravates the symptoms. She has tried nothing for the symptoms. Past Medical History:     Past Medical History:   Diagnosis Date    Arthritis     Asthma     Chickenpox     Hypertension     Hypothyroidism     Measles     Mumps     Osteoarthritis     Smallpox     Ulcer     Ulcer     Whooping cough       Reviewed all health maintenance requirements and orderedappropriate tests  Health Maintenance Due   Topic Date Due    DTaP/Tdap/Td vaccine (1 - Tdap) Never done    Diabetes screen  07/08/2022       Past Surgical History:     Past Surgical History:   Procedure Laterality Date    CHOLECYSTECTOMY, LAPAROSCOPIC N/A 3/4/2019    CHOLECYSTECTOMY LAPAROSCOPIC performed by Rafael Fernández MD at [id number] [facility]  07/20/2015    Herminia Lomas MD    HYSTERECTOMY (CERVIX STATUS UNKNOWN)  2006    Vaginal hysterectomy dr Xavier Carvalho ENDOSCOPY  07/20/2015    Herminia Lomas MD        Medications:       Prior to Admission medications    Medication Sig Start Date End Date Taking?  Authorizing Provider   loperamide (RA ANTI-DIARRHEAL) 2 MG capsule Take 1 capsule by mouth 4 times daily as needed for Diarrhea 8/25/22 9/4/22 Yes Marky Hardy MD   hydrocortisone (ANUSOL-HC) 2.5 % CREA rectal cream Place rectally 2 times daily 8/25/22  Yes Carlton Walker MD   vitamin D3 (CHOLECALCIFEROL) 25 MCG (1000 UT) TABS tablet Take 1 tablet by mouth daily 8/25/22  Yes Carlton Walker MD   albuterol (PROVENTIL) (2.5 MG/3ML) 0.083% nebulizer solution Take 3 mLs by nebulization every 6 hours as needed for Wheezing 7/22/22  Yes Carlton Walker MD   dilTIAZem (CARDIZEM) 30 MG tablet take 1 tablet by mouth twice a day 6/14/22  Yes Carlton Walker MD   omeprazole (PRILOSEC) 20 MG delayed release capsule take 1 capsule by mouth once daily 4/28/22  Yes Tierra Muñoz MD   diclofenac sodium (VOLTAREN) 1 % GEL Apply topically 2 times daily 4/27/22  Yes Carlton Walker MD   levothyroxine (SYNTHROID) 50 MCG tablet take 1 tablet by mouth once daily 3/7/22  Yes Carlton Walker MD   rivaroxaban (XARELTO) 20 MG TABS tablet take 1 tablet by mouth every morning with breakfast 12/14/21  Yes Carlton Walker MD   docusate sodium (COLACE) 100 MG capsule take 1 capsule by mouth daily 10/4/21  Yes Carlton Walker MD   clobetasol (TEMOVATE) 0.05 % cream Using a thin layer daily as needed for itching. Not to exceed 6 weeks of continuous use 9/21/21  Yes Ilene Carrero MD   lidocaine (XYLOCAINE) 5 % ointment Apply topically as needed every 2 hours 9/21/21  Yes Ilene Carrero MD   hydrocortisone (ANUSOL-HC) 25 MG suppository Place 1 suppository rectally every 12 hours 6/21/21  Yes Carlton Walker MD   conjugated estrogens (PREMARIN) 0.625 MG/GM vaginal cream Apply 2 gram intravaginally once a day at bed time x 2 wks  And then, 1 gram once a day x 7 days as needed 12/21/20  Yes Carlton Walker MD   phenylephrine-mineral oil-petrolatum (HEMORRHOIDAL) 0.25-14-74.9 % rectal ointment Place rectally 2 times daily as needed for Hemorrhoids 6/15/20  Yes Carlton Walker MD        Allergies:       Patient has no known allergies. Social History:     Tobacco: reports that she has never smoked.  She has never used smokeless tobacco.  Alcohol:      reports no history of alcohol use. Drug Use:  reports no history of drug use. Family History:     Family History   Problem Relation Age of Onset    Diabetes Father        Review of Systems:         Review of Systems   Constitutional:  Negative for activity change, appetite change, chills, fatigue, fever and weight loss. HENT:  Negative for congestion and sore throat. Respiratory:  Negative for cough and shortness of breath. Cardiovascular:  Negative for chest pain and palpitations. Gastrointestinal:  Positive for bloating and diarrhea. Negative for abdominal pain, blood in stool, nausea and vomiting. Hemorrhoids   Genitourinary:  Negative for dysuria. Musculoskeletal:  Positive for arthralgias. Tailbone pain   Skin:  Negative for rash. Neurological:  Negative for dizziness and headaches. Hematological:  Negative for adenopathy. Psychiatric/Behavioral:  The patient is not nervous/anxious. Physical Exam:     Vitals:  /82 (Site: Right Upper Arm)   Pulse 68   Resp 18   Ht 5' 6\" (1.676 m)   Wt 193 lb (87.5 kg)   SpO2 97%   BMI 31.15 kg/m²       Physical Exam  Vitals reviewed. Constitutional:       General: She is not in acute distress. Appearance: Normal appearance. She is well-developed. She is not ill-appearing. HENT:      Head: Normocephalic and atraumatic. Nose: Nose normal. No congestion. Mouth/Throat:      Mouth: Mucous membranes are moist.      Pharynx: Oropharynx is clear. Eyes:      Conjunctiva/sclera: Conjunctivae normal.   Neck:      Thyroid: No thyromegaly. Cardiovascular:      Rate and Rhythm: Normal rate and regular rhythm. Heart sounds: Normal heart sounds. No murmur heard. Pulmonary:      Effort: Pulmonary effort is normal.      Breath sounds: Normal breath sounds. No wheezing or rales. Abdominal:      General: Bowel sounds are normal. There is no distension. Palpations: Abdomen is soft. There is no mass. Tenderness:  There is no abdominal tenderness. Comments: Patient noted to have protruding hemorrhoids but they do not look thrombosed, no redness, nontender   Musculoskeletal:         General: Tenderness (to the sacral area and buttock area) present. Normal range of motion. Lymphadenopathy:      Cervical: No cervical adenopathy. Skin:     General: Skin is warm and dry. Coloration: Skin is not jaundiced or pale. Findings: No erythema or rash. Neurological:      Mental Status: She is alert and oriented to person, place, and time. Psychiatric:         Behavior: Behavior normal.         Judgment: Judgment normal.             Data:     Lab Results   Component Value Date/Time     01/19/2022 09:48 AM    K 4.2 01/19/2022 09:48 AM     01/19/2022 09:48 AM    CO2 22 01/19/2022 09:48 AM    BUN 13 01/19/2022 09:48 AM    CREATININE 0.77 01/19/2022 09:48 AM    GLUCOSE 104 01/19/2022 09:48 AM    PROT 8.2 01/19/2022 09:48 AM    LABALBU 3.8 01/19/2022 09:48 AM    BILITOT 0.42 01/19/2022 09:48 AM    ALKPHOS 88 01/19/2022 09:48 AM    AST 16 01/19/2022 09:48 AM    ALT 28 01/19/2022 09:48 AM     Lab Results   Component Value Date/Time    WBC 6.3 01/19/2022 09:48 AM    RBC 5.10 01/19/2022 09:48 AM    HGB 14.5 01/19/2022 09:48 AM    HCT 43.7 01/19/2022 09:48 AM    MCV 85.7 01/19/2022 09:48 AM    MCH 28.4 01/19/2022 09:48 AM    MCHC 33.1 01/19/2022 09:48 AM    RDW 15.2 01/19/2022 09:48 AM     01/19/2022 09:48 AM    MPV NOT REPORTED 01/19/2022 09:48 AM     Lab Results   Component Value Date/Time    TSH 2.76 01/19/2022 09:48 AM     Lab Results   Component Value Date/Time    CHOL 132 01/19/2022 09:48 AM    HDL 38 01/19/2022 09:48 AM    LABA1C 5.6 07/08/2019 03:05 PM          Assessment & Plan        Diagnosis Orders   1. Sacral pain   Will obtain x-ray of the sacrum area to rule out fractures or other bony abnormalities. Recommended Tylenol for pain.   She is on Xarelto and for this reason should avoid NSAIDs XR SACRUM COCCYX (MIN 2 VIEWS)      2. Diarrhea, unspecified type   Patient with no risk factors for C. difficile. She has been having loose stools for more than 10 months. We will try Imodium. Advised to try OTC Metamucil. Advised to hold omeprazole as PPIs can contribute to diarrhea. Will reevaluate in 2 weeks. May need referral to GI loperamide (RA ANTI-DIARRHEAL) 2 MG capsule      3. Hemorrhoids, unspecified hemorrhoid type   Advised to use Anusol cream       4. Hypothyroidism, unspecified type   TSH normal, continue on current dose of Synthroid       5. Current moderate episode of major depressive disorder, unspecified whether recurrent (HCC)   Symptoms stable. Patient is not on medications at this time and declines further interventions       6. Primary hypertension   Blood pressure stable       7. At high risk for falls                        Completed Refills   Requested Prescriptions     Signed Prescriptions Disp Refills    loperamide (RA ANTI-DIARRHEAL) 2 MG capsule 40 capsule 2     Sig: Take 1 capsule by mouth 4 times daily as needed for Diarrhea    hydrocortisone (ANUSOL-HC) 2.5 % CREA rectal cream 1 each 1     Sig: Place rectally 2 times daily    vitamin D3 (CHOLECALCIFEROL) 25 MCG (1000 UT) TABS tablet 30 tablet 0     Sig: Take 1 tablet by mouth daily     Return in about 2 weeks (around 9/8/2022), or diarrhea.      Orders Placed This Encounter   Medications    loperamide (RA ANTI-DIARRHEAL) 2 MG capsule     Sig: Take 1 capsule by mouth 4 times daily as needed for Diarrhea     Dispense:  40 capsule     Refill:  2    hydrocortisone (ANUSOL-HC) 2.5 % CREA rectal cream     Sig: Place rectally 2 times daily     Dispense:  1 each     Refill:  1    vitamin D3 (CHOLECALCIFEROL) 25 MCG (1000 UT) TABS tablet     Sig: Take 1 tablet by mouth daily     Dispense:  30 tablet     Refill:  0       Orders Placed This Encounter   Procedures    XR SACRUM COCCYX (MIN 2 VIEWS)     Standing Status:   Future     Standing Expiration Date:   8/25/2023           There are no Patient Instructions on file for this visit. Electronically signed by Juanis Salazar MD on 8/25/2022 at 11:00 AM           Completed Refills      Requested Prescriptions     Signed Prescriptions Disp Refills    loperamide (RA ANTI-DIARRHEAL) 2 MG capsule 40 capsule 2     Sig: Take 1 capsule by mouth 4 times daily as needed for Diarrhea    hydrocortisone (ANUSOL-HC) 2.5 % CREA rectal cream 1 each 1     Sig: Place rectally 2 times daily    vitamin D3 (CHOLECALCIFEROL) 25 MCG (1000 UT) TABS tablet 30 tablet 0     Sig: Take 1 tablet by mouth daily           On the basis of positive falls risk screening, assessment and plan is as follows: home safety tips provided, vitamin D supplementation started at 1000 IU/day, patient declines any further evaluation/treatment for increased falls risk.

## 2022-09-06 ENCOUNTER — HOSPITAL ENCOUNTER (OUTPATIENT)
Dept: GENERAL RADIOLOGY | Age: 67
Discharge: HOME | End: 2022-09-08
Payer: MEDICARE

## 2022-09-06 ENCOUNTER — HOSPITAL ENCOUNTER (OUTPATIENT)
Age: 67
Discharge: HOME | End: 2022-09-08
Payer: MEDICARE

## 2022-09-06 DIAGNOSIS — M53.3 SACRAL PAIN: ICD-10-CM

## 2022-09-06 PROCEDURE — 72220 X-RAY EXAM SACRUM TAILBONE: CPT

## 2022-09-09 ENCOUNTER — OFFICE VISIT (OUTPATIENT)
Dept: FAMILY MEDICINE CLINIC | Age: 67
End: 2022-09-09
Payer: MEDICARE

## 2022-09-09 VITALS
OXYGEN SATURATION: 97 % | WEIGHT: 194.6 LBS | HEIGHT: 66 IN | HEART RATE: 77 BPM | SYSTOLIC BLOOD PRESSURE: 122 MMHG | RESPIRATION RATE: 18 BRPM | BODY MASS INDEX: 31.27 KG/M2 | DIASTOLIC BLOOD PRESSURE: 88 MMHG

## 2022-09-09 DIAGNOSIS — M51.36 LUMBAR DEGENERATIVE DISC DISEASE: Primary | ICD-10-CM

## 2022-09-09 DIAGNOSIS — K64.9 HEMORRHOIDS, UNSPECIFIED HEMORRHOID TYPE: ICD-10-CM

## 2022-09-09 DIAGNOSIS — B37.31 VAGINAL CANDIDIASIS: ICD-10-CM

## 2022-09-09 DIAGNOSIS — F32.1 CURRENT MODERATE EPISODE OF MAJOR DEPRESSIVE DISORDER, UNSPECIFIED WHETHER RECURRENT (HCC): ICD-10-CM

## 2022-09-09 DIAGNOSIS — Z87.19 HX OF GASTROESOPHAGEAL REFLUX (GERD): ICD-10-CM

## 2022-09-09 DIAGNOSIS — Z23 NEED FOR INFLUENZA VACCINATION: ICD-10-CM

## 2022-09-09 PROBLEM — J18.9 PNEUMONIA OF RIGHT LOWER LOBE DUE TO INFECTIOUS ORGANISM: Status: RESOLVED | Noted: 2017-09-15 | Resolved: 2022-09-09

## 2022-09-09 PROCEDURE — G8417 CALC BMI ABV UP PARAM F/U: HCPCS | Performed by: INTERNAL MEDICINE

## 2022-09-09 PROCEDURE — 90694 VACC AIIV4 NO PRSRV 0.5ML IM: CPT | Performed by: INTERNAL MEDICINE

## 2022-09-09 PROCEDURE — G8427 DOCREV CUR MEDS BY ELIG CLIN: HCPCS | Performed by: INTERNAL MEDICINE

## 2022-09-09 PROCEDURE — G8399 PT W/DXA RESULTS DOCUMENT: HCPCS | Performed by: INTERNAL MEDICINE

## 2022-09-09 PROCEDURE — 1090F PRES/ABSN URINE INCON ASSESS: CPT | Performed by: INTERNAL MEDICINE

## 2022-09-09 PROCEDURE — 3017F COLORECTAL CA SCREEN DOC REV: CPT | Performed by: INTERNAL MEDICINE

## 2022-09-09 PROCEDURE — 1123F ACP DISCUSS/DSCN MKR DOCD: CPT | Performed by: INTERNAL MEDICINE

## 2022-09-09 PROCEDURE — G0008 ADMIN INFLUENZA VIRUS VAC: HCPCS | Performed by: INTERNAL MEDICINE

## 2022-09-09 PROCEDURE — 99214 OFFICE O/P EST MOD 30 MIN: CPT | Performed by: INTERNAL MEDICINE

## 2022-09-09 PROCEDURE — 1036F TOBACCO NON-USER: CPT | Performed by: INTERNAL MEDICINE

## 2022-09-09 RX ORDER — FLUCONAZOLE 150 MG/1
150 TABLET ORAL ONCE
Qty: 1 TABLET | Refills: 1 | Status: SHIPPED | OUTPATIENT
Start: 2022-09-09 | End: 2022-09-09

## 2022-09-09 RX ORDER — MELATONIN
1000 DAILY
Qty: 30 TABLET | Refills: 5 | Status: SHIPPED | OUTPATIENT
Start: 2022-09-09

## 2022-09-09 RX ORDER — FAMOTIDINE 20 MG/1
20 TABLET, FILM COATED ORAL 2 TIMES DAILY
Qty: 60 TABLET | Refills: 3 | Status: SHIPPED | OUTPATIENT
Start: 2022-09-09

## 2022-09-09 ASSESSMENT — ENCOUNTER SYMPTOMS
SHORTNESS OF BREATH: 0
BACK PAIN: 1
NAUSEA: 0
COUGH: 0
SORE THROAT: 0
ABDOMINAL PAIN: 0
BOWEL INCONTINENCE: 0

## 2022-09-09 NOTE — PROGRESS NOTES
HPI Notes    Name: Ade Marrero  : 1955         Chief Complaint:     Chief Complaint   Patient presents with    Follow-up     Patient is feeling better but concerned about the arthritis         History of Present Illness:        Sonny Chavez presents to office to follow up for chronic low back pain, diarrhea, GERD  She is accompanied by her daughter Tana Baez who helps with interpretation    Patient was seen in our office on 2022 for sacral pain, diarrhea and hemorrhoids. She had an x-ray of the sacrum which revealed no fractures or malalignment in the sacral area, however did show degenerative changes in the lower back. Patient states she is not having pain in her sacrum anymore but she does have low back pain. She is following up with chiropractor and also doing physical therapy    Patient states that her diarrhea resolved since she stopped taking omeprazole, however now having flareup of her GERD  Patient also reports that she has been having pain from hemorrhoids. Apparently she has been applying hydrocortisone cream intended to rectal area to her vagina and now developed burning and itching. Symptoms suspicious for yeast infection and she would like to try medication for that    Back Pain  This is a chronic problem. The current episode started more than 1 year ago. The problem occurs daily. The problem is unchanged. The pain is present in the lumbar spine. The quality of the pain is described as aching. The pain does not radiate. The pain is mild. The symptoms are aggravated by sitting. Pertinent negatives include no abdominal pain, bladder incontinence, bowel incontinence, chest pain, dysuria, fever, headaches or weight loss. She has tried chiropractic manipulation (Physical therapy) for the symptoms. The treatment provided moderate relief. Gastroesophageal Reflux  She reports no abdominal pain, no chest pain, no coughing, no nausea or no sore throat. This is a chronic problem.  The current episode started more than 1 year ago. The problem occurs frequently. The problem has been gradually worsening. The symptoms are aggravated by certain foods. Pertinent negatives include no fatigue or weight loss. She has tried nothing for the symptoms. Past Medical History:     Past Medical History:   Diagnosis Date    Arthritis     Asthma     Chickenpox     Hypertension     Hypothyroidism     Measles     Mumps     Osteoarthritis     Smallpox     Ulcer     Ulcer     Whooping cough       Reviewed all health maintenance requirements and orderedappropriate tests  Health Maintenance Due   Topic Date Due    DTaP/Tdap/Td vaccine (1 - Tdap) Never done    Diabetes screen  07/08/2022    Flu vaccine (1) 09/01/2022       Past Surgical History:     Past Surgical History:   Procedure Laterality Date    CHOLECYSTECTOMY, LAPAROSCOPIC N/A 3/4/2019    CHOLECYSTECTOMY LAPAROSCOPIC performed by Isaias Fournier MD at [id number]  07/20/2015    Mateo Mo MD    HYSTERECTOMY (CERVIX STATUS UNKNOWN)  2006    Vaginal hysterectomy dr Allyn Duron ENDOSCOPY  07/20/2015    Mateo Mo MD        Medications:       Prior to Admission medications    Medication Sig Start Date End Date Taking?  Authorizing Provider   fluconazole (DIFLUCAN) 150 MG tablet Take 1 tablet by mouth once for 1 dose 9/9/22 9/9/22 Yes Monico Daugherty MD   vitamin D3 (CHOLECALCIFEROL) 25 MCG (1000 UT) TABS tablet Take 1 tablet by mouth daily 9/9/22  Yes Monico Daugherty MD   famotidine (PEPCID) 20 MG tablet Take 1 tablet by mouth 2 times daily 9/9/22  Yes Monico Daugherty MD   hydrocortisone (ANUSOL-HC) 2.5 % CREA rectal cream Place rectally 2 times daily 8/25/22  Yes Monico Daugherty MD   albuterol (PROVENTIL) (2.5 MG/3ML) 0.083% nebulizer solution Take 3 mLs by nebulization every 6 hours as needed for Wheezing 7/22/22  Yes Monico Daugherty MD   dilTIAZem (CARDIZEM) 30 MG tablet take 1 tablet by mouth twice a day 6/14/22  Yes Manuel Mckeon Deidre Blunt MD   diclofenac sodium (VOLTAREN) 1 % GEL Apply topically 2 times daily 4/27/22  Yes Lauryn Andersen MD   levothyroxine (SYNTHROID) 50 MCG tablet take 1 tablet by mouth once daily 3/7/22  Yes Lauryn Andersen MD   rivaroxaban (XARELTO) 20 MG TABS tablet take 1 tablet by mouth every morning with breakfast 12/14/21  Yes Lauryn Andersen MD   docusate sodium (COLACE) 100 MG capsule take 1 capsule by mouth daily 10/4/21  Yes Lauryn Andersen MD   clobetasol (TEMOVATE) 0.05 % cream Using a thin layer daily as needed for itching. Not to exceed 6 weeks of continuous use 9/21/21  Yes Papa Nagy MD   lidocaine (XYLOCAINE) 5 % ointment Apply topically as needed every 2 hours 9/21/21  Yes Papa Nagy MD   hydrocortisone (ANUSOL-HC) 25 MG suppository Place 1 suppository rectally every 12 hours 6/21/21  Yes Lauryn Andersen MD   conjugated estrogens (PREMARIN) 0.625 MG/GM vaginal cream Apply 2 gram intravaginally once a day at bed time x 2 wks  And then, 1 gram once a day x 7 days as needed 12/21/20  Yes Lauryn Andersen MD   phenylephrine-mineral oil-petrolatum (HEMORRHOIDAL) 0.25-14-74.9 % rectal ointment Place rectally 2 times daily as needed for Hemorrhoids 6/15/20  Yes Lauryn Andersen MD        Allergies:       Patient has no known allergies. Social History:     Tobacco: reports that she has never smoked. She has never used smokeless tobacco.  Alcohol:      reports no history of alcohol use. Drug Use:  reports no history of drug use. Family History:     Family History   Problem Relation Age of Onset    Diabetes Father        Review of Systems:         Review of Systems   Constitutional:  Negative for chills, diaphoresis, fatigue, fever and weight loss. HENT:  Negative for congestion and sore throat. Respiratory:  Negative for cough and shortness of breath. Cardiovascular:  Negative for chest pain and palpitations.    Gastrointestinal:  Negative for abdominal pain, bowel incontinence and nausea. Painful hemorrhoids   Genitourinary:  Negative for bladder incontinence and dysuria. Vaginal itching and burning   Musculoskeletal:  Positive for back pain. Negative for gait problem, joint swelling and neck pain. Skin:  Negative for rash. Neurological:  Negative for dizziness and headaches. Psychiatric/Behavioral:  Negative for dysphoric mood, sleep disturbance and suicidal ideas. The patient is not nervous/anxious. Physical Exam:     Vitals:  /88 (Site: Left Upper Arm)   Pulse 77   Resp 18   Ht 5' 6\" (1.676 m)   Wt 194 lb 9.6 oz (88.3 kg)   SpO2 97%   BMI 31.41 kg/m²       Physical Exam  Vitals reviewed. Constitutional:       General: She is not in acute distress. Appearance: Normal appearance. She is well-developed. She is not ill-appearing. HENT:      Head: Normocephalic and atraumatic. Neck:      Thyroid: No thyromegaly. Cardiovascular:      Rate and Rhythm: Normal rate and regular rhythm. Heart sounds: Normal heart sounds. No murmur heard. Pulmonary:      Effort: Pulmonary effort is normal.      Breath sounds: Normal breath sounds. No wheezing or rales. Abdominal:      General: Bowel sounds are normal. There is no distension. Palpations: Abdomen is soft. There is no mass. Tenderness: There is no abdominal tenderness. Musculoskeletal:         General: No tenderness. Normal range of motion. Right lower leg: No edema. Left lower leg: No edema. Lymphadenopathy:      Cervical: No cervical adenopathy. Skin:     General: Skin is warm and dry. Coloration: Skin is not jaundiced or pale. Findings: No rash. Neurological:      General: No focal deficit present. Mental Status: She is alert and oriented to person, place, and time. Mental status is at baseline.    Psychiatric:         Mood and Affect: Mood normal.         Behavior: Behavior normal.             Data:     Lab Results   Component Value Date/Time  01/19/2022 09:48 AM    K 4.2 01/19/2022 09:48 AM     01/19/2022 09:48 AM    CO2 22 01/19/2022 09:48 AM    BUN 13 01/19/2022 09:48 AM    CREATININE 0.77 01/19/2022 09:48 AM    GLUCOSE 104 01/19/2022 09:48 AM    PROT 8.2 01/19/2022 09:48 AM    LABALBU 3.8 01/19/2022 09:48 AM    BILITOT 0.42 01/19/2022 09:48 AM    ALKPHOS 88 01/19/2022 09:48 AM    AST 16 01/19/2022 09:48 AM    ALT 28 01/19/2022 09:48 AM     Lab Results   Component Value Date/Time    WBC 6.3 01/19/2022 09:48 AM    RBC 5.10 01/19/2022 09:48 AM    HGB 14.5 01/19/2022 09:48 AM    HCT 43.7 01/19/2022 09:48 AM    MCV 85.7 01/19/2022 09:48 AM    MCH 28.4 01/19/2022 09:48 AM    MCHC 33.1 01/19/2022 09:48 AM    RDW 15.2 01/19/2022 09:48 AM     01/19/2022 09:48 AM    MPV NOT REPORTED 01/19/2022 09:48 AM     Lab Results   Component Value Date/Time    TSH 2.76 01/19/2022 09:48 AM     Lab Results   Component Value Date/Time    CHOL 132 01/19/2022 09:48 AM    HDL 38 01/19/2022 09:48 AM    LABA1C 5.6 07/08/2019 03:05 PM          Assessment & Plan        Diagnosis Orders   1. Lumbar degenerative disc disease   Patient's pain is mild to moderate. Advised to try Tylenol, continue PT. She is advised to avoid NSAIDs since she is on Xarelto. Follow-up if symptoms worsen       2. Vaginal candidiasis   Prescribed fluconazole. Patient advised not to use hydrocortisone hemorrhoid cream in her vaginal area fluconazole (DIFLUCAN) 150 MG tablet      3. Hx of gastroesophageal reflux (GERD)   Symptoms exacerbated after stopping omeprazole which we believed was causing diarrhea. Will prescribe Pepcid. Follow-up if no improvement famotidine (PEPCID) 20 MG tablet      4. Hemorrhoids, unspecified hemorrhoid type   Continue topical steroids.        5. Current moderate episode of major depressive disorder, unspecified whether recurrent ([facility]   In remission                       Completed Refills   Requested Prescriptions     Signed Prescriptions Disp Refills

## 2022-09-30 DIAGNOSIS — I48.91 ATRIAL FIBRILLATION, UNSPECIFIED TYPE (HCC): ICD-10-CM

## 2022-11-04 RX ORDER — ALBUTEROL SULFATE 2.5 MG/3ML
2.5 SOLUTION RESPIRATORY (INHALATION) EVERY 6 HOURS PRN
Qty: 120 EACH | Refills: 1 | Status: SHIPPED | OUTPATIENT
Start: 2022-11-04

## 2022-11-04 NOTE — TELEPHONE ENCOUNTER
Health Maintenance   Topic Date Due    DTaP/Tdap/Td vaccine (1 - Tdap) Never done    Diabetes screen  07/08/2022    Shingles vaccine (1 of 2) 12/09/2022 (Originally 5/27/2003)    COVID-19 Vaccine (3 - Booster for Shayla  series) 08/25/2023 (Originally 6/4/2021)    Annual Wellness Visit (AWV)  12/15/2022    Depression Monitoring  08/25/2023    Breast cancer screen  10/07/2023    Colorectal Cancer Screen  07/20/2025    Lipids  01/19/2027    DEXA (modify frequency per FRAX score)  Completed    Flu vaccine  Completed    Pneumococcal 65+ years Vaccine  Completed    Hepatitis C screen  Addressed    Hepatitis A vaccine  Aged Out    Hib vaccine  Aged Out    Meningococcal (ACWY) vaccine  Aged Out             (applicable per patient's age: Cancer Screenings, Depression Screening, Fall Risk Screening, Immunizations)    Hemoglobin A1C (%)   Date Value   07/08/2019 5.6   03/26/2018 5.9   09/25/2017 6.0     LDL Cholesterol (mg/dL)   Date Value   01/19/2022 73     AST (U/L)   Date Value   01/19/2022 16     ALT (U/L)   Date Value   01/19/2022 28     BUN (mg/dL)   Date Value   01/19/2022 13      (goal A1C is < 7)   (goal LDL is <100) need 30-50% reduction from baseline     BP Readings from Last 3 Encounters:   09/09/22 122/88   08/25/22 128/82   01/27/22 120/70    (goal /80)      All Future Testing planned in [facility]:  Lab Frequency Next Occurrence   CBC Auto Differential Once 01/27/2023   Comprehensive Metabolic Panel Once 31/09/7606   Vitamin D 25 Hydroxy Once 01/27/2023   Lipid Panel Once 01/27/2023   TSH with Reflex Once 01/27/2023   Magnesium Once 01/27/2023   EKG 12 Lead Once 01/27/2023   XR CHEST (2 VW) Once 01/27/2023       Next Visit Date:  Future Appointments   Date Time Provider Alexey Mosqueda   [facility] 12[facility]30 [facility]   1/26/2023 10:00 AM MD Namita Nickerson [facility]            Patient Active Problem List:     Hypothyroidism     Hx of gastroesophageal reflux (GERD) Hypertension     Paroxysmal atrial fibrillation (HCC)     Dysthymia     Gastroesophageal reflux disease     Asthma     Current moderate episode of major depressive disorder, unspecified whether recurrent ([facility].)

## 2022-11-15 ENCOUNTER — OFFICE VISIT (OUTPATIENT)
Dept: FAMILY MEDICINE CLINIC | Age: 67
End: 2022-11-15
Payer: MEDICARE

## 2022-11-15 VITALS
SYSTOLIC BLOOD PRESSURE: 118 MMHG | OXYGEN SATURATION: 99 % | DIASTOLIC BLOOD PRESSURE: 68 MMHG | HEART RATE: 68 BPM | HEIGHT: 66 IN | BODY MASS INDEX: 31.08 KG/M2 | WEIGHT: 193.4 LBS | RESPIRATION RATE: 18 BRPM

## 2022-11-15 DIAGNOSIS — K64.9 HEMORRHOIDS, UNSPECIFIED HEMORRHOID TYPE: Primary | ICD-10-CM

## 2022-11-15 DIAGNOSIS — B37.31 VAGINAL CANDIDIASIS: ICD-10-CM

## 2022-11-15 DIAGNOSIS — Z12.11 SCREEN FOR COLON CANCER: ICD-10-CM

## 2022-11-15 PROCEDURE — G8427 DOCREV CUR MEDS BY ELIG CLIN: HCPCS | Performed by: INTERNAL MEDICINE

## 2022-11-15 PROCEDURE — G8484 FLU IMMUNIZE NO ADMIN: HCPCS | Performed by: INTERNAL MEDICINE

## 2022-11-15 PROCEDURE — G8417 CALC BMI ABV UP PARAM F/U: HCPCS | Performed by: INTERNAL MEDICINE

## 2022-11-15 PROCEDURE — 1123F ACP DISCUSS/DSCN MKR DOCD: CPT | Performed by: INTERNAL MEDICINE

## 2022-11-15 PROCEDURE — 1036F TOBACCO NON-USER: CPT | Performed by: INTERNAL MEDICINE

## 2022-11-15 PROCEDURE — 3074F SYST BP LT 130 MM HG: CPT | Performed by: INTERNAL MEDICINE

## 2022-11-15 PROCEDURE — 3078F DIAST BP <80 MM HG: CPT | Performed by: INTERNAL MEDICINE

## 2022-11-15 PROCEDURE — G8399 PT W/DXA RESULTS DOCUMENT: HCPCS | Performed by: INTERNAL MEDICINE

## 2022-11-15 PROCEDURE — 3017F COLORECTAL CA SCREEN DOC REV: CPT | Performed by: INTERNAL MEDICINE

## 2022-11-15 PROCEDURE — 99213 OFFICE O/P EST LOW 20 MIN: CPT | Performed by: INTERNAL MEDICINE

## 2022-11-15 PROCEDURE — 1090F PRES/ABSN URINE INCON ASSESS: CPT | Performed by: INTERNAL MEDICINE

## 2022-11-15 RX ORDER — HYDROCORTISONE ACETATE 25 MG/1
25 SUPPOSITORY RECTAL EVERY 12 HOURS
Qty: 10 SUPPOSITORY | Refills: 1 | Status: SHIPPED | OUTPATIENT
Start: 2022-11-15

## 2022-11-15 RX ORDER — MINERAL OIL, PETROLATUM, PHENYLEPHRINE HCL 14; 74.9; .25 G/100G; G/100G; G/100G
OINTMENT RECTAL 2 TIMES DAILY PRN
Qty: 28 G | Refills: 0 | Status: SHIPPED | OUTPATIENT
Start: 2022-11-15

## 2022-11-15 RX ORDER — FLUCONAZOLE 150 MG/1
150 TABLET ORAL ONCE
Qty: 1 TABLET | Refills: 1 | Status: SHIPPED | OUTPATIENT
Start: 2022-11-15 | End: 2022-11-15

## 2022-11-15 ASSESSMENT — ENCOUNTER SYMPTOMS
SHORTNESS OF BREATH: 0
RECTAL PAIN: 1
SORE THROAT: 0
BLOOD IN STOOL: 1
ABDOMINAL PAIN: 0
NAUSEA: 0
COUGH: 0

## 2022-11-15 NOTE — PROGRESS NOTES
HPI Notes    Name: Lisbeth Lees  : 1955         Chief Complaint:     Chief Complaint   Patient presents with    Hemorrhoids     Patient is having a hemorrhoid flare up, patient now has bleeding       History of Present Illness:        Dulce Ha presents to office for evaluation of painful and bleeding hemorrhoids    She is Accompanied by her son who helps with interpretation  Has a h/o hemorrhoids for many years. She started having flareup of her symptoms about few days ago. States has no constipation or abdominal pain. No nausea or vomiting. Last Friday had blood in stool. Tried hemmorhoid cream and its not helping   Last  colonoscopy was about 15 years ago. She is interested in colonoscopy    Hemorrhoids  This is a recurrent problem. The current episode started in the past 7 days. The problem occurs constantly. The problem has been gradually worsening. Pertinent negatives include no abdominal pain, chest pain, chills, congestion, coughing, fever, headaches, nausea, rash or sore throat. Associated symptoms comments: Bleeding. The symptoms are aggravated by bending, standing and walking. Treatments tried: Hemorrhoid cream. The treatment provided no relief.          Past Medical History:     Past Medical History:   Diagnosis Date    Arthritis     Asthma     Chickenpox     Hypertension     Hypothyroidism     Measles     Mumps     Osteoarthritis     Smallpox     Ulcer     Ulcer     Whooping cough       Reviewed all health maintenance requirements and orderedappropriate tests  Health Maintenance Due   Topic Date Due    DTaP/Tdap/Td vaccine (1 - Tdap) Never done    Diabetes screen  2022    Annual Wellness Visit (AWV)  12/15/2022       Past Surgical History:     Past Surgical History:   Procedure Laterality Date    CHOLECYSTECTOMY, LAPAROSCOPIC N/A 3/4/2019    CHOLECYSTECTOMY LAPAROSCOPIC performed by Judy Hart MD at [place] [address]  2015    Minesh Arguello MD    HYSTERECTOMY (CERVIX STATUS UNKNOWN)  2006    Vaginal hysterectomy dr Delois Litten ENDOSCOPY  07/20/2015    Marce Neal MD        Medications:       Prior to Admission medications    Medication Sig Start Date End Date Taking? Authorizing Provider   fluconazole (DIFLUCAN) 150 MG tablet Take 1 tablet by mouth once for 1 dose 11/15/22 11/15/22 Yes Nano Keanrey MD   phenylephrine-mineral oil-petrolatum (HEMORRHOIDAL) 0.25-14-74.9 % rectal ointment Place rectally 2 times daily as needed for Hemorrhoids 11/15/22  Yes Nano Kearney MD   hydrocortisone (ANUSOL-HC) 25 MG suppository Place 1 suppository rectally in the morning and 1 suppository in the evening. 11/15/22  Yes Nano Kearney MD   albuterol (PROVENTIL) (2.5 MG/3ML) 0.083% nebulizer solution Take 3 mLs by nebulization every 6 hours as needed for Wheezing 11/4/22  Yes Nano Kearney MD   rivaroxaban (XARELTO) 20 MG TABS tablet take 1 tablet by mouth every morning with breakfast 10/3/22  Yes Nano Kearney MD   vitamin D3 (CHOLECALCIFEROL) 25 MCG (1000 UT) TABS tablet Take 1 tablet by mouth daily 9/9/22  Yes Nano Kearney MD   famotidine (PEPCID) 20 MG tablet Take 1 tablet by mouth 2 times daily 9/9/22  Yes Nano Kearney MD   dilTIAZem (CARDIZEM) 30 MG tablet take 1 tablet by mouth twice a day 6/14/22  Yes Nano Kearney MD   diclofenac sodium (VOLTAREN) 1 % GEL Apply topically 2 times daily 4/27/22  Yes Nano Kearney MD   levothyroxine (SYNTHROID) 50 MCG tablet take 1 tablet by mouth once daily 3/7/22  Yes Nano Kearney MD   docusate sodium (COLACE) 100 MG capsule take 1 capsule by mouth daily 10/4/21  Yes Nano Kearney MD   clobetasol (TEMOVATE) 0.05 % cream Using a thin layer daily as needed for itching.   Not to exceed 6 weeks of continuous use 9/21/21  Yes Grace Kirk MD   lidocaine (XYLOCAINE) 5 % ointment Apply topically as needed every 2 hours 9/21/21  Yes Grace Kirk MD   hydrocortisone (ANUSOL-HC) 25 MG suppository Place 1 suppository rectally every 12 hours 6/21/21  Yes Yannick Balderas MD   conjugated estrogens (PREMARIN) 0.625 MG/GM vaginal cream Apply 2 gram intravaginally once a day at bed time x 2 wks  And then, 1 gram once a day x 7 days as needed 12/21/20  Yes Yannick Balderas MD        Allergies:       Patient has no known allergies. Social History:     Tobacco: reports that she has never smoked. She has never used smokeless tobacco.  Alcohol:      reports no history of alcohol use. Drug Use:  reports no history of drug use. Family History:     Family History   Problem Relation Age of Onset    Diabetes Father        Review of Systems:         Review of Systems   Constitutional:  Negative for chills and fever. HENT:  Negative for congestion and sore throat. Respiratory:  Negative for cough and shortness of breath. Cardiovascular:  Negative for chest pain and palpitations. Gastrointestinal:  Positive for blood in stool, hemorrhoids and rectal pain. Negative for abdominal pain and nausea. Genitourinary:  Negative for dysuria. Skin:  Negative for rash. Neurological:  Negative for headaches. Psychiatric/Behavioral:  The patient is not nervous/anxious. Physical Exam:     Vitals:  /68 (Site: Right Upper Arm, Position: Sitting, Cuff Size: Large Adult)   Pulse 68   Resp 18   Ht 5' 6\" (1.676 m)   Wt 193 lb 6.4 oz (87.7 kg)   SpO2 99%   BMI 31.22 kg/m²       Physical Exam  Vitals reviewed. Constitutional:       General: She is not in acute distress. Appearance: She is well-developed. HENT:      Head: Normocephalic and atraumatic. Neck:      Thyroid: No thyromegaly. Cardiovascular:      Rate and Rhythm: Normal rate and regular rhythm. Heart sounds: Normal heart sounds. No murmur heard. Pulmonary:      Effort: Pulmonary effort is normal.      Breath sounds: Normal breath sounds. No wheezing or rales. Abdominal:      General: Bowel sounds are normal. There is no distension. Palpations: Abdomen is soft. There is no mass. Tenderness: There is no abdominal tenderness. Musculoskeletal:         General: Normal range of motion. Right lower leg: No edema. Left lower leg: No edema. Lymphadenopathy:      Cervical: No cervical adenopathy. Skin:     General: Skin is warm and dry. Findings: No rash. Neurological:      Mental Status: She is alert and oriented to person, place, and time. Psychiatric:         Behavior: Behavior normal.         Judgment: Judgment normal.             Data:     Lab Results   Component Value Date/Time     01/19/2022 09:48 AM    K 4.2 01/19/2022 09:48 AM     01/19/2022 09:48 AM    CO2 22 01/19/2022 09:48 AM    BUN 13 01/19/2022 09:48 AM    CREATININE 0.77 01/19/2022 09:48 AM    GLUCOSE 104 01/19/2022 09:48 AM    PROT 8.2 01/19/2022 09:48 AM    LABALBU 3.8 01/19/2022 09:48 AM    BILITOT 0.42 01/19/2022 09:48 AM    ALKPHOS 88 01/19/2022 09:48 AM    AST 16 01/19/2022 09:48 AM    ALT 28 01/19/2022 09:48 AM     Lab Results   Component Value Date/Time    WBC 6.3 01/19/2022 09:48 AM    RBC 5.10 01/19/2022 09:48 AM    HGB 14.5 01/19/2022 09:48 AM    HCT 43.7 01/19/2022 09:48 AM    MCV 85.7 01/19/2022 09:48 AM    MCH 28.4 01/19/2022 09:48 AM    MCHC 33.1 01/19/2022 09:48 AM    RDW 15.2 01/19/2022 09:48 AM     01/19/2022 09:48 AM    MPV NOT REPORTED 01/19/2022 09:48 AM     Lab Results   Component Value Date/Time    TSH 2.76 01/19/2022 09:48 AM     Lab Results   Component Value Date/Time    CHOL 132 01/19/2022 09:48 AM    HDL 38 01/19/2022 09:48 AM    LABA1C 5.6 07/08/2019 03:05 PM          Assessment & Plan        Diagnosis Orders   1. Hemorrhoids, unspecified hemorrhoid type   Prescribed hemorrhoidal ointment with phenylephrine, hydrocortisone rectal suppository and.   Suggested trying sitz baths  Will refer to Dr. Pia Gee for colonoscopy and evaluation of hemorrhoids phenylephrine-mineral oil-petrolatum (HEMORRHOIDAL) 0.25-14-74.9 % rectal ointment      2. Screen for colon cancer   Refer to Dr. January Mcarthur MD, Gastroenterology, [facility]      3. Vaginal candidiasis   Patient requested refill on fluconazole fluconazole (DIFLUCAN) 150 MG tablet                      Completed Refills   Requested Prescriptions     Signed Prescriptions Disp Refills    fluconazole (DIFLUCAN) 150 MG tablet 1 tablet 1     Sig: Take 1 tablet by mouth once for 1 dose    phenylephrine-mineral oil-petrolatum (HEMORRHOIDAL) 0.25-14-74.9 % rectal ointment 28 g 0     Sig: Place rectally 2 times daily as needed for Hemorrhoids    hydrocortisone (ANUSOL-HC) 25 MG suppository 10 suppository 1     Sig: Place 1 suppository rectally in the morning and 1 suppository in the evening. No follow-ups on file. Orders Placed This Encounter   Medications    fluconazole (DIFLUCAN) 150 MG tablet     Sig: Take 1 tablet by mouth once for 1 dose     Dispense:  1 tablet     Refill:  1    phenylephrine-mineral oil-petrolatum (HEMORRHOIDAL) 0.25-14-74.9 % rectal ointment     Sig: Place rectally 2 times daily as needed for Hemorrhoids     Dispense:  28 g     Refill:  0    hydrocortisone (ANUSOL-HC) 25 MG suppository     Sig: Place 1 suppository rectally in the morning and 1 suppository in the evening. Dispense:  10 suppository     Refill:  1       Orders Placed This Encounter   Procedures    Ankur Li MD, Gastroenterology, [facility]     Referral Priority:   Routine     Referral Type:   Eval and Treat     Referral Reason:   Specialty Services Required     Referred to Provider:   Edmond Ferrer MD     Requested Specialty:   Internal Medicine     Number of Visits Requested:   1           There are no Patient Instructions on file for this visit.     Electronically signed by Maylin Kendall MD on 11/15/2022 at 11:16 AM           Completed Refills      Requested Prescriptions     Signed Prescriptions Disp Refills    fluconazole (DIFLUCAN) 150 MG tablet 1 tablet 1 Sig: Take 1 tablet by mouth once for 1 dose    phenylephrine-mineral oil-petrolatum (HEMORRHOIDAL) 0.25-14-74.9 % rectal ointment 28 g 0     Sig: Place rectally 2 times daily as needed for Hemorrhoids    hydrocortisone (ANUSOL-HC) 25 MG suppository 10 suppository 1     Sig: Place 1 suppository rectally in the morning and 1 suppository in the evening.

## 2022-12-16 ENCOUNTER — OFFICE VISIT (OUTPATIENT)
Dept: FAMILY MEDICINE CLINIC | Age: 67
End: 2022-12-16

## 2022-12-16 VITALS
BODY MASS INDEX: 31.02 KG/M2 | WEIGHT: 193 LBS | HEART RATE: 73 BPM | SYSTOLIC BLOOD PRESSURE: 120 MMHG | RESPIRATION RATE: 18 BRPM | HEIGHT: 66 IN | OXYGEN SATURATION: 97 % | DIASTOLIC BLOOD PRESSURE: 70 MMHG

## 2022-12-16 DIAGNOSIS — N89.8 VAGINAL DRYNESS: ICD-10-CM

## 2022-12-16 DIAGNOSIS — Z00.00 MEDICARE ANNUAL WELLNESS VISIT, SUBSEQUENT: Primary | ICD-10-CM

## 2022-12-16 RX ORDER — CONJUGATED ESTROGENS 0.62 MG/G
CREAM VAGINAL
Qty: 42.5 G | Refills: 0 | Status: SHIPPED | OUTPATIENT
Start: 2022-12-16

## 2022-12-16 RX ORDER — IBUPROFEN 800 MG/1
TABLET ORAL
COMMUNITY
Start: 2022-11-04

## 2022-12-16 ASSESSMENT — LIFESTYLE VARIABLES
HOW OFTEN DO YOU HAVE A DRINK CONTAINING ALCOHOL: NEVER
HOW MANY STANDARD DRINKS CONTAINING ALCOHOL DO YOU HAVE ON A TYPICAL DAY: PATIENT DOES NOT DRINK

## 2022-12-16 ASSESSMENT — PATIENT HEALTH QUESTIONNAIRE - PHQ9
SUM OF ALL RESPONSES TO PHQ QUESTIONS 1-9: 0
7. TROUBLE CONCENTRATING ON THINGS, SUCH AS READING THE NEWSPAPER OR WATCHING TELEVISION: 0
8. MOVING OR SPEAKING SO SLOWLY THAT OTHER PEOPLE COULD HAVE NOTICED. OR THE OPPOSITE, BEING SO FIGETY OR RESTLESS THAT YOU HAVE BEEN MOVING AROUND A LOT MORE THAN USUAL: 0
SUM OF ALL RESPONSES TO PHQ QUESTIONS 1-9: 0
10. IF YOU CHECKED OFF ANY PROBLEMS, HOW DIFFICULT HAVE THESE PROBLEMS MADE IT FOR YOU TO DO YOUR WORK, TAKE CARE OF THINGS AT HOME, OR GET ALONG WITH OTHER PEOPLE: 0
SUM OF ALL RESPONSES TO PHQ QUESTIONS 1-9: 0
SUM OF ALL RESPONSES TO PHQ QUESTIONS 1-9: 0
5. POOR APPETITE OR OVEREATING: 0
3. TROUBLE FALLING OR STAYING ASLEEP: 0
9. THOUGHTS THAT YOU WOULD BE BETTER OFF DEAD, OR OF HURTING YOURSELF: 0
SUM OF ALL RESPONSES TO PHQ9 QUESTIONS 1 & 2: 0
1. LITTLE INTEREST OR PLEASURE IN DOING THINGS: 0
2. FEELING DOWN, DEPRESSED OR HOPELESS: 0
6. FEELING BAD ABOUT YOURSELF - OR THAT YOU ARE A FAILURE OR HAVE LET YOURSELF OR YOUR FAMILY DOWN: 0
4. FEELING TIRED OR HAVING LITTLE ENERGY: 0

## 2022-12-16 NOTE — PATIENT INSTRUCTIONS
Preventing Falls: Care Instructions  Overview     Getting around your home safely can be a challenge if you have injuries or health problems that make it easy for you to fall. Loose rugs and furniture in walkways are among the dangers for many older people who have problems walking or who have poor eyesight. People who have conditions such as arthritis, osteoporosis, or dementia also have to be careful not to fall. You can make your home safer with a few simple measures. Follow-up care is a key part of your treatment and safety. Be sure to make and go to all appointments, and call your doctor if you are having problems. It's also a good idea to know your test results and keep a list of the medicines you take. How can you care for yourself at home? Taking care of yourself  Exercise regularly to improve your strength, muscle tone, and balance. Walk if you can. Swimming may be a good choice if you cannot walk easily. Have your vision and hearing checked each year or any time you notice a change. If you have trouble seeing and hearing, you might not be able to avoid objects and could lose your balance. Know the side effects of the medicines you take. Ask your doctor or pharmacist whether the medicines you take can affect your balance. Sleeping pills or sedatives can affect your balance. Limit the amount of alcohol you drink. Alcohol can impair your balance and other senses. Ask your doctor whether calluses or corns on your feet need to be removed. If you wear loose-fitting shoes because of calluses or corns, you can lose your balance and fall. Talk to your doctor if you have numbness in your feet. You may get dizzy if you do not drink enough water. To prevent dehydration, drink plenty of fluids. Choose water and other clear liquids. If you have kidney, heart, or liver disease and have to limit fluids, talk with your doctor before you increase the amount of fluids you drink.   Preventing falls at home  Remove raised doorway thresholds, throw rugs, and clutter. Repair loose carpet or raised areas in the floor. Move furniture and electrical cords to keep them out of walking paths. Use nonskid floor wax, and wipe up spills right away, especially on ceramic tile floors. If you use a walker or cane, put rubber tips on it. If you use crutches, clean the bottoms of them regularly with an abrasive pad, such as steel wool. Keep your house well lit, especially stairways, porches, and outside walkways. Use night-lights in areas such as hallways and bathrooms. Add extra light switches or use remote switches (such as switches that go on or off when you clap your hands) to make it easier to turn lights on if you have to get up during the night. Install sturdy handrails on stairways. Move items in your cabinets so that the things you use a lot are on the lower shelves (about waist level). Keep a cordless phone and a flashlight with new batteries by your bed. If possible, put a phone in each of the main rooms of your house, or carry a cell phone in case you fall and cannot reach a phone. Or, you can wear a device around your neck or wrist. You push a button that sends a signal for help. Wear low-heeled shoes that fit well and give your feet good support. Use footwear with nonskid soles. Check the heels and soles of your shoes for wear. Repair or replace worn heels or soles. Do not wear socks without shoes on smooth floors, such as wood. Walk on the grass when the sidewalks are slippery. If you live in an area that gets snow and ice in the winter, sprinkle salt on slippery steps and sidewalks. Or ask a family member or friend to do this for you. Preventing falls in the bath  Install grab bars and nonskid mats inside and outside your shower or tub and near the toilet and sinks. Use shower chairs and bath benches. Use a hand-held shower head that will allow you to sit while showering.   Get into a tub or shower by putting the weaker leg in first. Get out of a tub or shower with your strong side first.  Repair loose toilet seats and consider installing a raised toilet seat to make getting on and off the toilet easier. Keep your bathroom door unlocked while you are in the shower. Where can you learn more? Go to http://www.gutierrez.com/ and enter G117 to learn more about \"Preventing Falls: Care Instructions. \"  Current as of: May 4, 2022               Content Version: 13.5  © 8214-2335 Healthwise, Incorporated. Care instructions adapted under license by [facility] ([facility]). If you have questions about a medical condition or this instruction, always ask your healthcare professional. [address] any warranty or liability for your use of this information. Hearing Loss: Care Instructions  Overview     Hearing loss is a sudden or slow decrease in how well you hear. It can range from mild to severe. Permanent hearing loss can occur with aging. It also can happen when you are exposed long-term to loud noise. Examples include listening to loud music, riding motorcycles, or being around other loud machines. Hearing loss can affect your work and home life. It can make you feel lonely or depressed. You may feel that you have lost your independence. But hearing aids and other devices can help you hear better and feel connected to others. Follow-up care is a key part of your treatment and safety. Be sure to make and go to all appointments, and call your doctor if you are having problems. It's also a good idea to know your test results and keep a list of the medicines you take. How can you care for yourself at home? Avoid loud noises whenever possible. This helps keep your hearing from getting worse. Always wear hearing protection around loud noises. Wear a hearing aid as directed. See a professional who can help you pick a hearing aid that fits you. Have hearing tests as your doctor suggests. They can show whether your hearing has changed. Your hearing aid may need to be adjusted. Use other devices as needed. These may include:  Telephone amplifiers and hearing aids that can connect to a television, stereo, radio, or microphone. Devices that use lights or vibrations. These alert you to the doorbell, a ringing telephone, or a baby monitor. Television closed-captioning. This shows the words at the bottom of the screen. Most new TVs can do this. TTY (text telephone). This lets you type messages back and forth on the telephone instead of talking or listening. These devices are also called TDD. When messages are typed on the keyboard, they are sent over the phone line to a receiving TTY. The message is shown on a monitor. Use text messaging, social media, and email if it is hard for you to communicate by telephone. Try to learn a listening technique called speechreading. It is not lipreading. You pay attention to people's gestures, expressions, posture, and tone of voice. These clues can help you understand what a person is saying. Face the person you are talking to, and have them face you. Make sure the lighting is good. You need to see the other person's face clearly. Think about counseling if you need help to adjust to your hearing loss. When should you call for help? Watch closely for changes in your health, and be sure to contact your doctor if:    You think your hearing is getting worse.     You have new symptoms, such as dizziness or nausea. Where can you learn more? Go to http://www.[employer].com/ and enter R798 to learn more about \"Hearing Loss: Care Instructions. \"  Current as of: May 4, 2022               Content Version: 13.5  © 5798-3413 Healthwise, Incorporated. Care instructions adapted under license by [facility] ([facility]).  If you have questions about a medical condition or this instruction, always ask your healthcare professional. Thalia Martinez any warranty or liability for your use of this information. Learning About Vision Tests  What are vision tests? The four most common vision tests are visual acuity tests, refraction, visual field tests, and color vision tests. Visual acuity (sharpness) tests  These tests are used: To see if you need glasses or contact lenses. To monitor an eye problem. To check an eye injury. Visual acuity tests are done as part of routine exams. You may also have this test when you get your 's license or apply for some types of jobs. Visual field tests  These tests are used: To check for vision loss in any area of your range of vision. To screen for certain eye diseases. To look for nerve damage after a stroke, head injury, or other problem that could reduce blood flow to the brain. Refraction and color tests  A refraction test is done to find the right prescription for glasses and contact lenses. A color vision test is done to check for color blindness. Color vision is often tested as part of a routine exam. You may also have this test when you apply for a job where recognizing different colors is important, such as , electronics, or the [employer]. How are vision tests done? Visual acuity test   You cover one eye at a time. You read aloud from a wall chart across the room. You read aloud from a small card that you hold in your hand. Refraction   You look into a special device. The device puts lenses of different strengths in front of each eye to see how strong your glasses or contact lenses need to be. Visual field tests   Your doctor may have you look through special machines. Or your doctor may simply have you stare straight ahead while they move a finger into and out of your field of vision. Color vision test   You look at pieces of printed test patterns in various colors. You say what number or symbol you see.   Your doctor may have you trace the number or symbol using a people who care for you. If you have one, they won't have to make tough decisions by themselves. For more information, including forms for your state, see the [address] website (www.caringinfo.org/planning/advance-directives/). Follow-up care is a key part of your treatment and safety. Be sure to make and go to all appointments, and call your doctor if you are having problems. It's also a good idea to know your test results and keep a list of the medicines you take. What should you include in an advance directive? Many states have a unique advance directive form. (It may ask you to address specific issues.) Or you might use a universal form that's approved by many states. If your form doesn't tell you what to address, it may be hard to know what to include in your advance directive. Use the questions below to help you get started. Who do you want to make decisions about your medical care if you are not able to? What life-support measures do you want if you have a serious illness that gets worse over time or can't be cured? What are you most afraid of that might happen? (Maybe you're afraid of having pain, losing your independence, or being kept alive by machines.)  Where would you prefer to die? (Your home? A hospital? A nursing home?)  Do you want to donate your organs when you die? Do you want certain [employer] practices performed before you die? When should you call for help? Be sure to contact your doctor if you have any questions. Where can you learn more? Go to http://www.gutierrez.com/ and enter R264 to learn more about \"Advance Directives: Care Instructions. \"  Current as of: June 16, 2022               Content Version: 13.5  © 6312-6018 Healthwise, Incorporated. Care instructions adapted under license by [facility] [employer] [facility] ([facility]).  If you have questions about a medical condition or this instruction, always ask your healthcare professional. [facility] disclaims any warranty or liability for your use of this information. Personalized Preventive Plan for Tera Sandoval - 12/16/2022  Medicare offers a range of preventive health benefits. Some of the tests and screenings are paid in full while other may be subject to a deductible, co-insurance, and/or copay. Some of these benefits include a comprehensive review of your medical history including lifestyle, illnesses that may run in your family, and various assessments and screenings as appropriate. After reviewing your medical record and screening and assessments performed today your provider may have ordered immunizations, labs, imaging, and/or referrals for you. A list of these orders (if applicable) as well as your Preventive Care list are included within your After Visit Summary for your review. Other Preventive Recommendations:    A preventive eye exam performed by an eye specialist is recommended every 1-2 years to screen for glaucoma; cataracts, macular degeneration, and other eye disorders. A preventive dental visit is recommended every 6 months. Try to get at least 150 minutes of exercise per week or 10,000 steps per day on a pedometer . Order or download the FREE \"Exercise & Physical Activity: Your Everyday Guide\" from The [employer]. Call 0-185.763.3913 or search The [employer] online. You need 1121-9791 mg of calcium and 2613-4756 IU of vitamin D per day. It is possible to meet your calcium requirement with diet alone, but a vitamin D supplement is usually necessary to meet this goal.  When exposed to the sun, use a sunscreen that protects against both UVA and UVB radiation with an SPF of 30 or greater. Reapply every 2 to 3 hours or after sweating, drying off with a towel, or swimming. Always wear a seat belt when traveling in a car. Always wear a helmet when riding a bicycle or motorcycle.

## 2022-12-16 NOTE — PROGRESS NOTES
Medicare Annual Wellness Visit    Dwayne Colon is here for Medicare AWV (Patient has no complaints)    Assessment & Plan   Vaginal dryness  -     estrogens conjugated (PREMARIN) 0.625 MG/GM CREA vaginal cream; Apply 2 gram intravaginally once a day at bed time x 2 wks  And then, 1 gram once a day x 7 days as needed, Disp-42.5 g, R-0, Normal    Recommendations for Preventive Services Due: see orders and patient instructions/AVS.  Recommended screening schedule for the next 5-10 years is provided to the patient in written form: see Patient Instructions/AVS.     No follow-ups on file. Subjective       Patient's complete Health Risk Assessment and screening values have been reviewed and are found in Flowsheets. The following problems were reviewed today and where indicated follow up appointments were made and/or referrals ordered. Positive Risk Factor Screenings with Interventions:    Fall Risk:  Do you feel unsteady or are you worried about falling? : (!) yes  2 or more falls in past year?: (!) yes  Fall with injury in past year?: no     Interventions:    Patient comments: patient went outside when it was dark to turn on [place] lights, missed a step on her porch and fell. Declined interventions at this time  See AVS for additional education material  See A/P for plan and any pertinent orders    Cognitive:    Words recalled: 3 Words Recalled   Clock Drawing Test (CDT): (!) Abnormal   Total Score: 3   Total Score Interpretation: Normal Mini-Cog      Interventions:  Patient declines any further evaluation or treatment             Weight and Activity:  Physical Activity: Insufficiently Active    Days of Exercise per Week: 7 days    Minutes of Exercise per Session: 10 min     On average, how many days per week do you engage in moderate to strenuous exercise (like a brisk walk)?: 7 days  Have you lost any weight without trying in the past 3 months?: No  Body mass index: (!) 31.15    Obesity Interventions:  Patient comments: she has been walking more to lose weight, made changes in diet  Patient declines any further evaluation or treatment           Hearing Screen:  Do you or your family notice any trouble with your hearing that hasn't been managed with hearing aids?: (!) Yes    Interventions:  Patient comments: has hearing aids but not using them   Patient declines any further evaluation or treatment    Vision Screen:  Do you have difficulty driving, watching TV, or doing any of your daily activities because of your eyesight?: No  Have you had an eye exam within the past year?: (!) No  No results found. Interventions:   Patient encouraged to make appointment with their eye specialist  See AVS for additional education material  See A/P for any pertinent orders      Advanced Directives:  Do you have a Living Will?: (!) No    Intervention:  Family is helping to obtain a Living Will                        Objective   Vitals:    12/16/22 1214   BP: 120/70   Site: Right Upper Arm   Position: Sitting   Cuff Size: Small Adult   Pulse: 73   Resp: 18   SpO2: 97%   Weight: 193 lb (87.5 kg)   Height: 5' 6\" (1.676 m)      Body mass index is 31.15 kg/m².       General Appearance: alert and oriented to person, place and time, well developed and well- nourished, in no acute distress  Skin: warm and dry, no rash   Head: normocephalic and atraumatic  Eyes: pupils equal, round, and reactive to light, extraocular eye movements intact, conjunctivae normal  ENT: tympanic membrane, external ear and ear canal normal bilaterally, nose without deformity, nasal mucosa and turbinates normal without polyps  Neck: supple and non-tender without mass, no thyromegaly or thyroid nodules, no cervical lymphadenopathy  Pulmonary/Chest: clear to auscultation bilaterally- no wheezes, rales or rhonchi, normal air movement, no respiratory distress  Cardiovascular: normal rate, regular rhythm, normal S1 and S2, no murmurs, rubs, clicks, or gallops  Abdomen: soft, non-tender, non-distended, normal bowel sounds  Extremities: no cyanosis, clubbing or edema  Musculoskeletal: normal range of motion, no joint swelling, deformity or tenderness  Neurologic: reflexes normal and symmetric, no cranial nerve deficit, gait, coordination and speech normal       No Known Allergies  Prior to Visit Medications    Medication Sig Taking? Authorizing Provider   ibuprofen (ADVIL;MOTRIN) 800 MG tablet take 1 tablet by mouth three times a day with food or milk if needed Yes Historical Provider, MD   estrogens conjugated (PREMARIN) 0.625 MG/GM CREA vaginal cream Apply 2 gram intravaginally once a day at bed time x 2 wks  And then, 1 gram once a day x 7 days as needed Yes Staci Dillon MD   phenylephrine-mineral oil-petrolatum (HEMORRHOIDAL) 0.25-14-74.9 % rectal ointment Place rectally 2 times daily as needed for Hemorrhoids Yes Staci Dillon MD   hydrocortisone (ANUSOL-HC) 25 MG suppository Place 1 suppository rectally in the morning and 1 suppository in the evening.  Yes Staci Dillon MD   albuterol (PROVENTIL) (2.5 MG/3ML) 0.083% nebulizer solution Take 3 mLs by nebulization every 6 hours as needed for Wheezing Yes Staci Dillon MD   rivaroxaban (XARELTO) 20 MG TABS tablet take 1 tablet by mouth every morning with breakfast Yes Staci Dillon MD   vitamin D3 (CHOLECALCIFEROL) 25 MCG (1000 UT) TABS tablet Take 1 tablet by mouth daily Yes Staci Dillon MD   famotidine (PEPCID) 20 MG tablet Take 1 tablet by mouth 2 times daily Yes Staci Dillon MD   dilTIAZem (CARDIZEM) 30 MG tablet take 1 tablet by mouth twice a day Yes Staci Dillon MD   diclofenac sodium (VOLTAREN) 1 % GEL Apply topically 2 times daily Yes Staci Dillon MD   levothyroxine (SYNTHROID) 50 MCG tablet take 1 tablet by mouth once daily Yes Staci Dillon MD   docusate sodium (COLACE) 100 MG capsule take 1 capsule by mouth daily Yes Staci Dillon MD   clobetasol (TEMOVATE) 0.05 % cream Using a thin layer daily as needed for itching.   Not to exceed 6 weeks of continuous use Yes Ghazala Fernández MD   lidocaine (XYLOCAINE) 5 % ointment Apply topically as needed every 2 hours Yes Ghazala Fernández MD   hydrocortisone (ANUSOL-HC) 25 MG suppository Place 1 suppository rectally every 12 hours Yes Reinaldo Honeycutt MD       [facility] (Including outside providers/suppliers regularly involved in providing care):   Patient Care Team:  Reinaldo Honeycutt MD as PCP - General (Hospitalist)  Katiuska Flores MD as PCP - OBGYN (Obstetrics & Gynecology)  Reinaldo Honeycutt MD as PCP - [facility] Empaneled Provider  Judy Hart MD as Surgeon (General Surgery)     Reviewed and updated this visit:  Tobacco  Allergies  Meds  Problems  Med Hx  Surg Hx  Soc Hx  Fam Hx

## 2022-12-20 DIAGNOSIS — I48.0 PAROXYSMAL ATRIAL FIBRILLATION (HCC): ICD-10-CM

## 2022-12-29 ENCOUNTER — HOSPITAL ENCOUNTER (EMERGENCY)
Age: 67
Discharge: HOME | End: 2022-12-29
Attending: FAMILY MEDICINE
Payer: MEDICARE

## 2022-12-29 VITALS
RESPIRATION RATE: 16 BRPM | TEMPERATURE: 97.9 F | DIASTOLIC BLOOD PRESSURE: 77 MMHG | OXYGEN SATURATION: 97 % | BODY MASS INDEX: 31.8 KG/M2 | HEART RATE: 67 BPM | WEIGHT: 197 LBS | SYSTOLIC BLOOD PRESSURE: 144 MMHG

## 2022-12-29 DIAGNOSIS — Z79.01 LONG TERM (CURRENT) USE OF ANTICOAGULANTS: ICD-10-CM

## 2022-12-29 DIAGNOSIS — K64.4 EXTERNAL HEMORRHOIDS: Primary | ICD-10-CM

## 2022-12-29 DIAGNOSIS — K62.5 BLOOD PER RECTUM: ICD-10-CM

## 2022-12-29 LAB
-: NORMAL
ABSOLUTE EOS #: 0 K/UL (ref 0–0.4)
ABSOLUTE LYMPH #: 1.7 K/UL (ref 1–4.8)
ABSOLUTE MONO #: 0.6 K/UL (ref 0–1)
ALBUMIN SERPL-MCNC: 3.9 G/DL (ref 3.5–5.2)
ALP BLD-CCNC: 102 U/L (ref 35–104)
ALT SERPL-CCNC: 17 U/L (ref 5–33)
ANION GAP SERPL CALCULATED.3IONS-SCNC: 8 MMOL/L (ref 9–17)
AST SERPL-CCNC: 13 U/L
BASOPHILS # BLD: 1 % (ref 0–2)
BASOPHILS ABSOLUTE: 0 K/UL (ref 0–0.2)
BILIRUB SERPL-MCNC: 0.2 MG/DL (ref 0.3–1.2)
BILIRUBIN URINE: NEGATIVE
BUN BLDV-MCNC: 16 MG/DL (ref 8–23)
BUN/CREAT BLD: 19 (ref 9–20)
CALCIUM SERPL-MCNC: 8.8 MG/DL (ref 8.6–10.4)
CHLORIDE BLD-SCNC: 103 MMOL/L (ref 98–107)
CO2: 28 MMOL/L (ref 20–31)
COLOR: YELLOW
COMMENT UA: ABNORMAL
CREAT SERPL-MCNC: 0.85 MG/DL (ref 0.5–0.9)
DIFFERENTIAL TYPE: YES
EOSINOPHILS RELATIVE PERCENT: 0 % (ref 0–5)
EPITHELIAL CELLS UA: NORMAL /HPF
GFR SERPL CREATININE-BSD FRML MDRD: >60 ML/MIN/1.73M2
GLUCOSE BLD-MCNC: 99 MG/DL (ref 70–99)
GLUCOSE URINE: NEGATIVE
HCT VFR BLD CALC: 42 % (ref 36–46)
HEMOGLOBIN: 13.9 G/DL (ref 12–16)
KETONES, URINE: NEGATIVE
LACTIC ACID: 1.1 MMOL/L (ref 0.5–2.2)
LEUKOCYTE ESTERASE, URINE: NEGATIVE
LIPASE: 39 U/L (ref 13–60)
LYMPHOCYTES # BLD: 26 % (ref 15–40)
MCH RBC QN AUTO: 28.4 PG (ref 26–34)
MCHC RBC AUTO-ENTMCNC: 33 G/DL (ref 31–37)
MCV RBC AUTO: 86.2 FL (ref 80–100)
MONOCYTES # BLD: 9 % (ref 4–8)
NITRITE, URINE: NEGATIVE
PDW BLD-RTO: 14.8 % (ref 12.1–15.2)
PH UA: 6 (ref 5–8)
PLATELET # BLD: 252 K/UL (ref 140–450)
POTASSIUM SERPL-SCNC: 4.3 MMOL/L (ref 3.7–5.3)
PROTEIN UA: NEGATIVE
RBC # BLD: 4.88 M/UL (ref 4–5.2)
RBC UA: NORMAL /HPF (ref 0–2)
SEG NEUTROPHILS: 64 % (ref 47–75)
SEGMENTED NEUTROPHILS ABSOLUTE COUNT: 4.1 K/UL (ref 2.5–7)
SODIUM BLD-SCNC: 139 MMOL/L (ref 135–144)
SPECIFIC GRAVITY UA: 1.01 (ref 1–1.03)
TOTAL PROTEIN: 7.4 G/DL (ref 6.4–8.3)
TURBIDITY: CLEAR
URINE HGB: ABNORMAL
UROBILINOGEN, URINE: NORMAL
WBC # BLD: 6.4 K/UL (ref 3.5–11)
WBC UA: NORMAL /HPF

## 2022-12-29 PROCEDURE — 83605 ASSAY OF LACTIC ACID: CPT

## 2022-12-29 PROCEDURE — 83690 ASSAY OF LIPASE: CPT

## 2022-12-29 PROCEDURE — 81001 URINALYSIS AUTO W/SCOPE: CPT

## 2022-12-29 PROCEDURE — 80053 COMPREHEN METABOLIC PANEL: CPT

## 2022-12-29 PROCEDURE — 85025 COMPLETE CBC W/AUTO DIFF WBC: CPT

## 2022-12-29 PROCEDURE — 99283 EMERGENCY DEPT VISIT LOW MDM: CPT

## 2022-12-29 RX ORDER — HYDROCORTISONE ACETATE 25 MG/1
25 SUPPOSITORY RECTAL 2 TIMES DAILY
Qty: 14 SUPPOSITORY | Refills: 0 | Status: SHIPPED | OUTPATIENT
Start: 2022-12-29

## 2022-12-29 ASSESSMENT — PAIN - FUNCTIONAL ASSESSMENT: PAIN_FUNCTIONAL_ASSESSMENT: 0-10

## 2022-12-29 ASSESSMENT — PAIN DESCRIPTION - LOCATION: LOCATION: RECTUM

## 2022-12-29 ASSESSMENT — LIFESTYLE VARIABLES: HOW OFTEN DO YOU HAVE A DRINK CONTAINING ALCOHOL: NEVER

## 2022-12-29 ASSESSMENT — PAIN DESCRIPTION - PAIN TYPE: TYPE: ACUTE PAIN

## 2022-12-29 ASSESSMENT — PAIN DESCRIPTION - DESCRIPTORS: DESCRIPTORS: BURNING

## 2022-12-30 NOTE — ED PROVIDER NOTES
[facility]  eMERGENCY dEPARTMENT eNCOUnter          [address] [facility]       Chief Complaint   Patient presents with    Hemorrhoids     Bleeding from hemhorroids for 3 days. Dr. Tyshawn Giles office told her to come here. Nurses Notes reviewed and I agree except as noted in the HPI. HISTORY OF PRESENT ILLNESS    Christy Couch is a 69 y.o. female who presents to the emergency room via private vehicle with daughter, later acting as , patient planing of some rectal bleeding, has noted some burning and itching in her rectal area, denies any vaginal bleed or discharge, denies any dysuria. Ice over the past 3 days, does note over the past 2 days she is had a little constipation. Denies chest pain or palpitations denies shortness of breath denies any difficulty breathing denies any lightheadedness or dizziness. Patient is noted to be on Xarelto secondary to atrial fibrillation. Denies trauma or falls. REVIEW OF SYSTEMS     Review of Systems   All other systems reviewed and are negative. PAST MEDICAL HISTORY    has a past medical history of Arthritis, Asthma, Chickenpox, Hypertension, Hypothyroidism, Measles, Mumps, Osteoarthritis, Smallpox, Ulcer, Ulcer, and Whooping cough. SURGICAL HISTORY      has a past surgical history that includes Hysterectomy (2006); Colonoscopy (07/20/2015); Upper gastrointestinal endoscopy (07/20/2015); and Cholecystectomy, laparoscopic (N/A, 3/4/2019).     CURRENT MEDICATIONS       Discharge Medication List as of 12/29/2022 12:36 PM        CONTINUE these medications which have NOT CHANGED    Details   dilTIAZem (CARDIZEM) 30 MG tablet take 1 tablet by mouth twice a day, Disp-180 tablet, R-1Normal      ibuprofen (ADVIL;MOTRIN) 800 MG tablet take 1 tablet by mouth three times a day with food or milk if neededHistorical Med      estrogens conjugated (PREMARIN) 0.625 MG/GM CREA vaginal cream Apply 2 gram intravaginally once a day at bed time x 2 wks  And then, 1 gram once a day x 7 days as needed, Disp-42.5 g, R-0, Normal      phenylephrine-mineral oil-petrolatum (HEMORRHOIDAL) 0.25-14-74.9 % rectal ointment Place rectally 2 times daily as needed for Hemorrhoids, Disp-28 g, R-0Normal      !! hydrocortisone (ANUSOL-HC) 25 MG suppository Place 1 suppository rectally in the morning and 1 suppository in the evening., Disp-10 suppository, R-1Normal      albuterol (PROVENTIL) (2.5 MG/3ML) 0.083% nebulizer solution Take 3 mLs by nebulization every 6 hours as needed for Wheezing, Disp-120 each, R-1Normal      rivaroxaban (XARELTO) 20 MG TABS tablet take 1 tablet by mouth every morning with breakfast, Disp-90 tablet, R-1Normal      vitamin D3 (CHOLECALCIFEROL) 25 MCG (1000 UT) TABS tablet Take 1 tablet by mouth daily, Disp-30 tablet, R-5Normal      famotidine (PEPCID) 20 MG tablet Take 1 tablet by mouth 2 times daily, Disp-60 tablet, R-3Normal      diclofenac sodium (VOLTAREN) 1 % GEL Apply topically 2 times daily, Topical, 2 TIMES DAILY Starting 2022, Disp-150 g, R-3, Normal      levothyroxine (SYNTHROID) 50 MCG tablet take 1 tablet by mouth once daily, Disp-90 tablet, R-1Normal      docusate sodium (COLACE) 100 MG capsule take 1 capsule by mouth daily, Disp-30 capsule, R-3Normal      clobetasol (TEMOVATE) 0.05 % cream Using a thin layer daily as needed for itching. Not to exceed 6 weeks of continuous use, Disp-60 g, R-3, Normal      lidocaine (XYLOCAINE) 5 % ointment Apply topically as needed every 2 hours, Disp-1 each, R-3, Normal      !! hydrocortisone (ANUSOL-HC) 25 MG suppository Place 1 suppository rectally every 12 hours, Disp-10 suppository, R-1Normal       !! - Potential duplicate medications found. Please discuss with provider. ALLERGIES     has No Known Allergies. FAMILY HISTORY     She indicated that her mother is . She indicated that her father is alive. She indicated that her maternal grandmother is .  She indicated that her maternal grandfather is . She indicated that her paternal grandmother is . She indicated that her paternal grandfather is . She indicated that both of her daughters are alive. She indicated that all of her three sons are alive. family history includes Diabetes in her father. SOCIAL HISTORY      reports that she has never smoked. She has never used smokeless tobacco. She reports that she does not drink alcohol and does not use drugs. PHYSICAL EXAM     INITIAL VITALS:  weight is 197 lb (89.4 kg). Her oral temperature is 97.9 °F (36.6 °C). Her blood pressure is 144/77 (abnormal) and her pulse is 67. Her respiration is 16 and oxygen saturation is 97%. Physical Exam   Constitutional: Patient is oriented to person, place, and time. Patient appears well-developed and well-nourished. Patient is active and cooperative. HENT:   Head: Normocephalic and atraumatic. Head is without contusion. Right Ear: Hearing and external ear normal. No drainage. Left Ear: Hearing and external ear normal. No drainage. Nose: Nose normal. No nasal deformity. No epistaxis. Mouth/Throat: Mucous membranes are not dry. Eyes: EOMI. Conjunctivae, sclera, and lids are normal. Right eye exhibits no discharge. Left eye exhibits no discharge. Neck: Full passive range of motion without pain and phonation normal.   Cardiovascular:  Normal rate, regular rhythm and intact distal pulses. No edema. Pulses: Right radial pulse  2+   Pulmonary/Chest: Effort normal. No tachypnea and no bradypnea. No wheezes, rhonchi, or rales. Abdominal: BMI 31.8, soft. Patient without distension or tenderness  Rectal: as per MDM below  Musculoskeletal:   Negative acute trauma or deformity,  apparent full range of motion and normal strength all extremities appropriate to age. Neurological: Patient is alert and oriented to person, place, and time. patient displays no tremor.  Patient displays no seizure activity. .  Skin: Skin is warm and dry. Patient is not diaphoretic. Psychiatric: Patient has a normal mood and affect.  Patient speech is normal and behavior is normal. Cognition and memory are normal.     DIFFERENTIAL DIAGNOSIS:   Rectal bleed NOS, hemorrhoids    DIAGNOSTIC RESULTS         RADIOLOGY: non-plain film images(s) such as CT, Ultrasound and MRI are read by the radiologist.  No orders to display       LABS:   Labs Reviewed   COMPREHENSIVE METABOLIC PANEL - Abnormal; Notable for the following components:       Result Value    Anion Gap 8 (*)     Total Bilirubin 0.2 (*)     All other components within normal limits   CBC WITH AUTO DIFFERENTIAL - Abnormal; Notable for the following components:    Monocytes 9 (*)     All other components within normal limits   URINALYSIS - Abnormal; Notable for the following components:    Urine Hgb 2+ (*)     All other components within normal limits   LIPASE   LACTIC ACID   MICROSCOPIC URINALYSIS       EMERGENCY DEPARTMENT COURSE:   Vitals:    Vitals:    12/29/22 1113   BP: (!) 144/77   Pulse: 67   Resp: 16   Temp: 97.9 °F (36.6 °C)   TempSrc: Oral   SpO2: 97%   Weight: 197 lb (89.4 kg)     Patient history and physical exam taken at bedside with daughter present, the latter acting as , discussed patient symptoms and exam findings, discussed getting basic blood work and urinalysis, will get focused rectal exam, and reevaluate, acknowledged    Rectal exam performed with PCA Ayde does not and daughter on the other side of the curtain acting as , noting external hemorrhoid and small external tag, nonthrombosed, no fissures or abrasions, RIDGE was not grossly bloody, noted at least 1 internal hemorrhoid, some stool in the rectal vault, fecal occult blood test negative with normal controls    Lab work-up reviewed    Discussed with patient overall work-up, discussed likely bleeding from her external hemorrhoids and/or her internal hemorrhoid, will start patient on Anusol HC, but advised patient stay well-hydrated, follow-up with her primary care, return to ER any symptoms change worse other concerns, acknowledged    FINAL IMPRESSION      1. External hemorrhoids    2. Long term (current) use of anticoagulants    3. Blood per rectum          DISPOSITION/PLAN   D/c    PATIENT REFERRED TO:  MD Oly Haynes 3. [place] [address]    Call       [facility] ED  [address] [facility] [address]  [phone]    As needed, If symptoms worsen      DISCHARGE MEDICATIONS:  Discharge Medication List as of 12/29/2022 12:36 PM        START taking these medications    Details   !! hydrocortisone (ANUSOL-HC) 25 MG suppository Place 1 suppository rectally 2 times daily, Disp-14 suppository, R-0Normal       !! - Potential duplicate medications found. Please discuss with provider. Summation      Patient Course:  d/c    ED Medications administered this visit:  Medications - No data to display    New Prescriptions from this visit:    Discharge Medication List as of 12/29/2022 12:36 PM        START taking these medications    Details   !! hydrocortisone (ANUSOL-HC) 25 MG suppository Place 1 suppository rectally 2 times daily, Disp-14 suppository, R-0Normal       !! - Potential duplicate medications found. Please discuss with provider. Follow-up:  MD Oly Haynes 3. [place] [address]  [phone]    Call       [facility] ED  [address] [facility] [address]  [phone]    As needed, If symptoms worsen        Final Impression:   1. External hemorrhoids    2. Long term (current) use of anticoagulants    3.  Blood per rectum               (Please note that portions of this note were completed with a voice recognition program.  Efforts were made to edit the dictations but occasionally words are mis-transcribed.)    MD Yeimy Leigh MD  12/30/22 3011

## 2023-01-05 DIAGNOSIS — Z87.19 HX OF GASTROESOPHAGEAL REFLUX (GERD): ICD-10-CM

## 2023-01-05 RX ORDER — FAMOTIDINE 20 MG/1
TABLET, FILM COATED ORAL
Qty: 60 TABLET | Refills: 3 | Status: SHIPPED | OUTPATIENT
Start: 2023-01-05

## 2023-01-25 ENCOUNTER — HOSPITAL ENCOUNTER (OUTPATIENT)
Age: 68
Discharge: HOME | End: 2023-01-27
Payer: MEDICARE

## 2023-01-25 ENCOUNTER — HOSPITAL ENCOUNTER (OUTPATIENT)
Dept: GENERAL RADIOLOGY | Age: 68
Discharge: HOME | End: 2023-01-27
Payer: MEDICARE

## 2023-01-25 ENCOUNTER — HOSPITAL ENCOUNTER (OUTPATIENT)
Age: 68
Discharge: HOME | End: 2023-01-25
Payer: MEDICARE

## 2023-01-25 DIAGNOSIS — E03.9 HYPOTHYROIDISM, UNSPECIFIED TYPE: ICD-10-CM

## 2023-01-25 DIAGNOSIS — E55.9 VITAMIN D DEFICIENCY, UNSPECIFIED: ICD-10-CM

## 2023-01-25 DIAGNOSIS — I48.0 PAROXYSMAL ATRIAL FIBRILLATION (HCC): ICD-10-CM

## 2023-01-25 LAB
ABSOLUTE EOS #: 0 K/UL (ref 0–0.4)
ABSOLUTE LYMPH #: 1.6 K/UL (ref 1–4.8)
ABSOLUTE MONO #: 0.7 K/UL (ref 0–1)
ALBUMIN SERPL-MCNC: 4 G/DL (ref 3.5–5.2)
ALP BLD-CCNC: 111 U/L (ref 35–104)
ALT SERPL-CCNC: 25 U/L (ref 5–33)
ANION GAP SERPL CALCULATED.3IONS-SCNC: 7 MMOL/L (ref 9–17)
AST SERPL-CCNC: 14 U/L
BASOPHILS # BLD: 0 % (ref 0–2)
BASOPHILS ABSOLUTE: 0 K/UL (ref 0–0.2)
BILIRUB SERPL-MCNC: 0.5 MG/DL (ref 0.3–1.2)
BUN BLDV-MCNC: 14 MG/DL (ref 8–23)
BUN/CREAT BLD: 20 (ref 9–20)
CALCIUM SERPL-MCNC: 9.2 MG/DL (ref 8.6–10.4)
CHLORIDE BLD-SCNC: 102 MMOL/L (ref 98–107)
CHOLESTEROL/HDL RATIO: 2.9
CHOLESTEROL: 158 MG/DL
CO2: 31 MMOL/L (ref 20–31)
CREAT SERPL-MCNC: 0.7 MG/DL (ref 0.5–0.9)
DIFFERENTIAL TYPE: YES
EOSINOPHILS RELATIVE PERCENT: 0 % (ref 0–5)
GFR SERPL CREATININE-BSD FRML MDRD: >60 ML/MIN/1.73M2
GLUCOSE BLD-MCNC: 100 MG/DL (ref 70–99)
HCT VFR BLD CALC: 45.8 % (ref 36–46)
HDLC SERPL-MCNC: 54 MG/DL
HEMOGLOBIN: 14.8 G/DL (ref 12–16)
LDL CHOLESTEROL: 71 MG/DL (ref 0–130)
LYMPHOCYTES # BLD: 13 % (ref 15–40)
MAGNESIUM: 2.2 MG/DL (ref 1.6–2.6)
MCH RBC QN AUTO: 28.3 PG (ref 26–34)
MCHC RBC AUTO-ENTMCNC: 32.3 G/DL (ref 31–37)
MCV RBC AUTO: 87.5 FL (ref 80–100)
MONOCYTES # BLD: 6 % (ref 4–8)
PATIENT FASTING?: YES
PDW BLD-RTO: 14.6 % (ref 12.1–15.2)
PLATELET # BLD: 310 K/UL (ref 140–450)
POTASSIUM SERPL-SCNC: 4.3 MMOL/L (ref 3.7–5.3)
RBC # BLD: 5.24 M/UL (ref 4–5.2)
SEG NEUTROPHILS: 81 % (ref 47–75)
SEGMENTED NEUTROPHILS ABSOLUTE COUNT: 9.7 K/UL (ref 2.5–7)
SODIUM BLD-SCNC: 140 MMOL/L (ref 135–144)
TOTAL PROTEIN: 7.9 G/DL (ref 6.4–8.3)
TRIGL SERPL-MCNC: 163 MG/DL
TSH SERPL DL<=0.05 MIU/L-ACNC: 2.58 UIU/ML (ref 0.3–5)
VITAMIN D 25-HYDROXY: 30.5 NG/ML
WBC # BLD: 12.1 K/UL (ref 3.5–11)

## 2023-01-25 PROCEDURE — 84443 ASSAY THYROID STIM HORMONE: CPT

## 2023-01-25 PROCEDURE — 93005 ELECTROCARDIOGRAM TRACING: CPT

## 2023-01-25 PROCEDURE — 80061 LIPID PANEL: CPT

## 2023-01-25 PROCEDURE — 82306 VITAMIN D 25 HYDROXY: CPT

## 2023-01-25 PROCEDURE — 36415 COLL VENOUS BLD VENIPUNCTURE: CPT

## 2023-01-25 PROCEDURE — 71046 X-RAY EXAM CHEST 2 VIEWS: CPT

## 2023-01-25 PROCEDURE — 80053 COMPREHEN METABOLIC PANEL: CPT

## 2023-01-25 PROCEDURE — 85025 COMPLETE CBC W/AUTO DIFF WBC: CPT

## 2023-01-25 PROCEDURE — 83735 ASSAY OF MAGNESIUM: CPT

## 2023-01-26 ENCOUNTER — OFFICE VISIT (OUTPATIENT)
Dept: CARDIOLOGY CLINIC | Age: 68
End: 2023-01-26
Payer: MEDICARE

## 2023-01-26 VITALS
HEART RATE: 78 BPM | WEIGHT: 191 LBS | OXYGEN SATURATION: 94 % | BODY MASS INDEX: 30.83 KG/M2 | SYSTOLIC BLOOD PRESSURE: 120 MMHG | DIASTOLIC BLOOD PRESSURE: 70 MMHG

## 2023-01-26 DIAGNOSIS — J45.901 ASTHMA WITH ACUTE EXACERBATION, UNSPECIFIED ASTHMA SEVERITY, UNSPECIFIED WHETHER PERSISTENT: ICD-10-CM

## 2023-01-26 DIAGNOSIS — I48.0 PAROXYSMAL ATRIAL FIBRILLATION (HCC): Primary | ICD-10-CM

## 2023-01-26 DIAGNOSIS — I10 PRIMARY HYPERTENSION: ICD-10-CM

## 2023-01-26 DIAGNOSIS — E03.9 HYPOTHYROIDISM, UNSPECIFIED TYPE: ICD-10-CM

## 2023-01-26 LAB
EKG ATRIAL RATE: 71 BPM
EKG P AXIS: 75 DEGREES
EKG P-R INTERVAL: 150 MS
EKG Q-T INTERVAL: 392 MS
EKG QRS DURATION: 106 MS
EKG QTC CALCULATION (BAZETT): 425 MS
EKG R AXIS: 92 DEGREES
EKG T AXIS: 76 DEGREES
EKG VENTRICULAR RATE: 71 BPM

## 2023-01-26 PROCEDURE — 1090F PRES/ABSN URINE INCON ASSESS: CPT | Performed by: INTERNAL MEDICINE

## 2023-01-26 PROCEDURE — G8417 CALC BMI ABV UP PARAM F/U: HCPCS | Performed by: INTERNAL MEDICINE

## 2023-01-26 PROCEDURE — G8399 PT W/DXA RESULTS DOCUMENT: HCPCS | Performed by: INTERNAL MEDICINE

## 2023-01-26 PROCEDURE — G8427 DOCREV CUR MEDS BY ELIG CLIN: HCPCS | Performed by: INTERNAL MEDICINE

## 2023-01-26 PROCEDURE — 1123F ACP DISCUSS/DSCN MKR DOCD: CPT | Performed by: INTERNAL MEDICINE

## 2023-01-26 PROCEDURE — 1036F TOBACCO NON-USER: CPT | Performed by: INTERNAL MEDICINE

## 2023-01-26 PROCEDURE — G8484 FLU IMMUNIZE NO ADMIN: HCPCS | Performed by: INTERNAL MEDICINE

## 2023-01-26 PROCEDURE — 99214 OFFICE O/P EST MOD 30 MIN: CPT | Performed by: INTERNAL MEDICINE

## 2023-01-26 PROCEDURE — 3074F SYST BP LT 130 MM HG: CPT | Performed by: INTERNAL MEDICINE

## 2023-01-26 PROCEDURE — 3078F DIAST BP <80 MM HG: CPT | Performed by: INTERNAL MEDICINE

## 2023-01-26 PROCEDURE — 3017F COLORECTAL CA SCREEN DOC REV: CPT | Performed by: INTERNAL MEDICINE

## 2023-01-26 NOTE — PROGRESS NOTES
Ov Dr James Hasting 1 year follow up   No chest pain or sob  No ankle edema  No hospitalizations or procedures  Since   Was in ED for bleeding Hemorrhoids. Yariel Small here with pt. Has 3 girls and 1 son  21,18,11 and 5  Informed dr has occ pain in rt   Side of chest .  Pt has 2 girls and 3 boys. Has diarrhea every time she   Takes the pepcid   Informed to stop med . See if it helps. Will see in 1 year.

## 2023-01-26 NOTE — LETTER
[facility]  [address], [facility]  [phone]      2023      Jud Carrasco MD  [address] [facility], [address] [facility]      RE:   Saundra Tuttle  :  1955      Dear Dr. Niranjan Moody:    CHIEF COMPLAINT:  1. Paroxysmal atrial fibrillation, which seems to be well controlled. 2.  On Xarelto for anticoagulation. HISTORY OF PRESENT ILLNESS:  I had the pleasure of seeing Mrs. Cielo Herrmann in the office on 2023. She is a very pleasant 68-year-old female who speaks very little [place]. Her , Deepti Mobley, or daughter, Falguni Falk, or Klarissa Price, comes to translate. Klarissa Price is with her today and did an excellent job. She has a history of atrial fibrillation with RVR discovered on 09/15/2017, when she presented to the hospital with right lower lobe pneumonia. She converted back to sinus rhythm 12 hours later and was asymptomatic and she has been anticoagulated since that time. Echocardiogram shows an EF of 55% with moderate-to-severe LVH. She has never had a myocardial infarction or cardiac catheterization. She denies any chest pain or chest discomfort. No PND, orthopnea or pedal edema. She really had no complaints as I see her today. She did have hemorrhoids and was seen in the emergency room. She feels that Pepcid might be contributing to her going to the bathroom more. CARDIAC RISK FACTORS:  Hypertension:  Positive. Other Family Members:  Positive. Peripheral Vascular Disease:  Negative. Diabetes:  Negative. Smoking:  Negative. Hyperlipidemia:  Negative. MEDICATIONS AT THIS TIME:  She is on Voltaren gel p.r.n., Cardizem 30 mg b.i.d., Premarin 0.625 vaginal cream, Synthroid 50 mcg daily, lidocaine ointment, Xarelto 20 mg daily, vitamin D 1000 units daily. PAST MEDICAL AND SURGICAL HISTORY:  1. Peptic ulcer disease. 2.  Euthyroid, on treatment. 3.  Hypertension, well controlled.   4. Asthma. 5.  Cholecystectomy in 03/2019.  6.  Paroxysmal atrial fibrillation with no known episodes since 09/15/2017. 7.  Hemorrhoids. FAMILY HISTORY:  Mother had diabetes and CAD with a pacemaker. Father had diabetes. SOCIAL HISTORY:  She is 69years old, . Five children, with two girls and three boys. 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 Yehuda Devlin is her older daughter, and Barry John, younger daughter. Yehuda Devlin has four children, with three girls and one boy. A daughter, 24, in a nursing school, a daughter, 23, senior in high school, and a daughter, 6, and a son, 8.  Odette Sumaya is an  on the [employer], and [employer] in [place]. Nette Jessica does not smoke or drink alcohol. REVIEW OF SYSTEMS:  Cardiac as above. Other systems reviewed including constitutional, eyes, ears, nose and throat, cardiovascular, respiratory, GI, , musculoskeletal, integumentary, neurologic, endocrine, hematologic and immunologic/allergic are negative except for what is described above. No weight loss or weight gain. No change in bowel habits. No blood in stools. No fevers, sweats or chills. PHYSICAL EXAMINATION:  VITAL SIGNS:  Blood pressure was 120/70 with a heart rate of 78 and regular. Respiratory rate 18. O2 sat 94%. Weight 191 pounds. GENERAL:  She is a pleasant 78-year-old [place] American female. Denied pain. She was oriented to person, place and time. Accompanied by her daughter, Yehuda Devlin, who translated. SKIN:  No unusual skin changes. HEENT:  The pupils are equally round and intact. Mucous membranes were dry. NECK:  No JVD. Good carotid pulses. No carotid bruits. No lymphadenopathy or thyromegaly. CARDIOVASCULAR EXAM:  S1 and S2 were normal.  No S3 or S4. Soft systolic blowing type murmur. No diastolic murmur. PMI was normal.  No lift, thrust, or pericardial friction rub. LUNGS:  Clear to auscultation and percussion.   ABDOMEN:  Soft and nontender. Good bowel sounds. EXTREMITIES:  Good femoral pulses. Good pedal pulses. No pedal edema. Skin was warm and dry. No calf tenderness. Nail beds pink. Good cap refill. PULSES:  Bilateral symmetrical radial, brachial and carotid pulses. No carotid bruits. Good femoral and pedal pulses. NEUROLOGIC EXAM:  Within normal limits. PSYCHIATRIC EXAM:  Within normal limits. LABORATORY DATA:  Sodium was 140, potassium 4.3, BUN 14, creatinine 0.7, GFR greater than 60, magnesium 2.2, calcium 9.2. Cholesterol 158, HDL was 51, LDL 71, triglycerides 163. ALT was 15, AST was 14. TSH was 2.58. White count 12.1, hemoglobin 14.8 with a platelet count 407,800. EKG showed normal sinus rhythm with incomplete right bundle-branch block with no change from previous EKGs. Chest x-ray was unremarkable. IMPRESSION:  1. Paroxysmal atrial fibrillation when she was hospitalized for pneumonia on 09/15/2017, with no known further episodes. 2.  Anticoagulated with Xarelto because of CHADS score of 3.  3.  Normal LV function. 4.  Normal stress test.  5.  Asymptomatic. PLAN:  1. No change in medications. 2.  Follow up in one year. DISCUSSION:  Mrs. Fadi Victor overall is doing excellent. She has no typical chest pain. She has some atypical discomfort in the right side of her chest that is sporadic and spontaneous. It lasts generally for several seconds and subsides. Otherwise, she has no unusual shortness of breath. Energy level is good. I made no change in medications. I will plan on seeing her in one year unless a problem would develop. Thank you very much for allowing me the privilege of seeing Mrs. Fadi Victor. If you have any questions on my thoughts, please do not hesitate to contact me.     Sincerely,        Marco Vela MD    D: 01/26/2023 10:31:03     T: 01/26/2023 10:34:05     [email or web address]  Job#: [id number]   Doc#: [id number]

## 2023-02-07 DIAGNOSIS — Z12.39 ENCOUNTER FOR SCREENING FOR MALIGNANT NEOPLASM OF BREAST, UNSPECIFIED SCREENING MODALITY: Primary | ICD-10-CM

## 2023-02-07 NOTE — PROGRESS NOTES
Pradip Rebolledo MD  [facility]  [address], [place] [address] [phone]      2023      Jewell Nuno MD  [address] [facility], [address] [facility]      RE:   Cortney Narvaez  :  1955      Dear Dr. Toñito Webster:    CHIEF COMPLAINT:  1. Paroxysmal atrial fibrillation, which seems to be well controlled. 2.  On Xarelto for anticoagulation. HISTORY OF PRESENT ILLNESS:  I had the pleasure of seeing Mrs. Marcy Griffin in the office on 2023. She is a very pleasant 78-year-old female who speaks very little [place]. Her , Bright Mccormick, or daughter, Jon Gonzales, or Sherly Valentin, comes to translate. Sherly Valentin is with her today and did an excellent job. She has a history of atrial fibrillation with RVR discovered on 09/15/2017, when she presented to the hospital with right lower lobe pneumonia. She converted back to sinus rhythm 12 hours later and was asymptomatic and she has been anticoagulated since that time. Echocardiogram shows an EF of 55% with moderate-to-severe LVH. She has never had a myocardial infarction or cardiac catheterization. She denies any chest pain or chest discomfort. No PND, orthopnea or pedal edema. She really had no complaints as I see her today. She did have hemorrhoids and was seen in the emergency room. She feels that Pepcid might be contributing to her going to the bathroom more. CARDIAC RISK FACTORS:  Hypertension:  Positive. Other Family Members:  Positive. Peripheral Vascular Disease:  Negative. Diabetes:  Negative. Smoking:  Negative. Hyperlipidemia:  Negative. MEDICATIONS AT THIS TIME:  She is on Voltaren gel p.r.n., Cardizem 30 mg b.i.d., Premarin 0.625 vaginal cream, Synthroid 50 mcg daily, lidocaine ointment, Xarelto 20 mg daily, vitamin D 1000 units daily. PAST MEDICAL AND SURGICAL HISTORY:  1. Peptic ulcer disease. 2.  Euthyroid, on treatment. 3.  Hypertension, well controlled.   4. Asthma. 5.  Cholecystectomy in 03/2019.  6.  Paroxysmal atrial fibrillation with no known episodes since 09/15/2017. 7.  Hemorrhoids. FAMILY HISTORY:  Mother had diabetes and CAD with a pacemaker. Father had diabetes. SOCIAL HISTORY:  She is 69years old, . Five children, with two girls and three boys. 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 Scarlett Red is her older daughter, and Maryjo Arevalo, younger daughter. Scarlett Red has four children, with three girls and one boy. A daughter, 24, in a nursing school, a daughter, 23, senior in high school, and a daughter, 6, and a son, 8.  Hallie Neil is an  on the [email or web address], and [employer] in [place]. Desi Gil does not smoke or drink alcohol. REVIEW OF SYSTEMS:  Cardiac as above. Other systems reviewed including constitutional, eyes, ears, nose and throat, cardiovascular, respiratory, GI, , musculoskeletal, integumentary, neurologic, endocrine, hematologic and immunologic/allergic are negative except for what is described above. No weight loss or weight gain. No change in bowel habits. No blood in stools. No fevers, sweats or chills. PHYSICAL EXAMINATION:  VITAL SIGNS:  Blood pressure was 120/70 with a heart rate of 78 and regular. Respiratory rate 18. O2 sat 94%. Weight 191 pounds. GENERAL:  She is a pleasant 69-year-old [place] American female. Denied pain. She was oriented to person, place and time. Accompanied by her daughter, Scarlett Red, who translated. SKIN:  No unusual skin changes. HEENT:  The pupils are equally round and intact. Mucous membranes were dry. NECK:  No JVD. Good carotid pulses. No carotid bruits. No lymphadenopathy or thyromegaly. CARDIOVASCULAR EXAM:  S1 and S2 were normal.  No S3 or S4. Soft systolic blowing type murmur. No diastolic murmur. PMI was normal.  No lift, thrust, or pericardial friction rub. LUNGS:  Clear to auscultation and percussion.   ABDOMEN:  Soft and nontender. Good bowel sounds. EXTREMITIES:  Good femoral pulses. Good pedal pulses. No pedal edema. Skin was warm and dry. No calf tenderness. Nail beds pink. Good cap refill. PULSES:  Bilateral symmetrical radial, brachial and carotid pulses. No carotid bruits. Good femoral and pedal pulses. NEUROLOGIC EXAM:  Within normal limits. PSYCHIATRIC EXAM:  Within normal limits. LABORATORY DATA:  Sodium was 140, potassium 4.3, BUN 14, creatinine 0.7, GFR greater than 60, magnesium 2.2, calcium 9.2. Cholesterol 158, HDL was 51, LDL 71, triglycerides 163. ALT was 15, AST was 14. TSH was 2.58. White count 12.1, hemoglobin 14.8 with a platelet count 066,852. EKG showed normal sinus rhythm with incomplete right bundle-branch block with no change from previous EKGs. Chest x-ray was unremarkable. IMPRESSION:  1. Paroxysmal atrial fibrillation when she was hospitalized for pneumonia on 09/15/2017, with no known further episodes. 2.  Anticoagulated with Xarelto because of CHADS score of 3.  3.  Normal LV function. 4.  Normal stress test.  5.  Asymptomatic. PLAN:  1. No change in medications. 2.  Follow up in one year. DISCUSSION:  Mrs. Mateo Marshall overall is doing excellent. She has no typical chest pain. She has some atypical discomfort in the right side of her chest that is sporadic and spontaneous. It lasts generally for several seconds and subsides. Otherwise, she has no unusual shortness of breath. Energy level is good. I made no change in medications. I will plan on seeing her in one year unless a problem would develop. Thank you very much for allowing me the privilege of seeing Mrs. Mateo Marshall. If you have any questions on my thoughts, please do not hesitate to contact me.     Sincerely,        Justin Law MD    D: 01/26/2023 10:31:03     T: 01/26/2023 10:34:05     [email or web address]  Job#: [id number]   Doc#: [id number]

## 2023-02-09 DIAGNOSIS — Z12.31 BREAST CANCER SCREENING BY MAMMOGRAM: Primary | ICD-10-CM

## 2023-02-17 ENCOUNTER — HOSPITAL ENCOUNTER (OUTPATIENT)
Dept: MAMMOGRAPHY | Age: 68
End: 2023-02-17
Payer: MEDICARE

## 2023-02-17 ENCOUNTER — HOSPITAL ENCOUNTER (OUTPATIENT)
Dept: BONE DENSITY | Age: 68
End: 2023-02-17
Payer: MEDICARE

## 2023-02-17 DIAGNOSIS — E28.39 ESTROGEN DEFICIENCY: ICD-10-CM

## 2023-02-17 DIAGNOSIS — Z12.31 ENCOUNTER FOR SCREENING MAMMOGRAM FOR MALIGNANT NEOPLASM OF BREAST: ICD-10-CM

## 2023-02-17 PROCEDURE — 77067 SCR MAMMO BI INCL CAD: CPT

## 2023-02-17 PROCEDURE — 77080 DXA BONE DENSITY AXIAL: CPT

## 2023-02-22 DIAGNOSIS — E03.9 ACQUIRED HYPOTHYROIDISM: ICD-10-CM

## 2023-02-22 RX ORDER — LEVOTHYROXINE SODIUM 0.05 MG/1
TABLET ORAL
Qty: 90 TABLET | Refills: 1 | Status: SHIPPED | OUTPATIENT
Start: 2023-02-22

## 2023-02-23 ENCOUNTER — OFFICE VISIT (OUTPATIENT)
Dept: GASTROENTEROLOGY | Age: 68
End: 2023-02-23
Payer: MEDICARE

## 2023-02-23 VITALS
WEIGHT: 191 LBS | BODY MASS INDEX: 30.83 KG/M2 | OXYGEN SATURATION: 97 % | RESPIRATION RATE: 19 BRPM | HEART RATE: 67 BPM

## 2023-02-23 DIAGNOSIS — K62.5 RECTAL BLEEDING: Primary | ICD-10-CM

## 2023-02-23 DIAGNOSIS — Z12.11 COLON CANCER SCREENING: ICD-10-CM

## 2023-02-23 PROCEDURE — G8417 CALC BMI ABV UP PARAM F/U: HCPCS | Performed by: INTERNAL MEDICINE

## 2023-02-23 PROCEDURE — 1036F TOBACCO NON-USER: CPT | Performed by: INTERNAL MEDICINE

## 2023-02-23 PROCEDURE — 99202 OFFICE O/P NEW SF 15 MIN: CPT | Performed by: INTERNAL MEDICINE

## 2023-02-23 PROCEDURE — G8427 DOCREV CUR MEDS BY ELIG CLIN: HCPCS | Performed by: INTERNAL MEDICINE

## 2023-02-23 PROCEDURE — 1123F ACP DISCUSS/DSCN MKR DOCD: CPT | Performed by: INTERNAL MEDICINE

## 2023-02-23 PROCEDURE — G8484 FLU IMMUNIZE NO ADMIN: HCPCS | Performed by: INTERNAL MEDICINE

## 2023-02-23 PROCEDURE — 3017F COLORECTAL CA SCREEN DOC REV: CPT | Performed by: INTERNAL MEDICINE

## 2023-02-23 PROCEDURE — G8399 PT W/DXA RESULTS DOCUMENT: HCPCS | Performed by: INTERNAL MEDICINE

## 2023-02-23 PROCEDURE — 1090F PRES/ABSN URINE INCON ASSESS: CPT | Performed by: INTERNAL MEDICINE

## 2023-02-23 RX ORDER — POLYETHYLENE GLYCOL 3350, SODIUM CHLORIDE, SODIUM BICARBONATE, POTASSIUM CHLORIDE 420; 11.2; 5.72; 1.48 G/4L; G/4L; G/4L; G/4L
4000 POWDER, FOR SOLUTION ORAL ONCE
Qty: 4000 ML | Refills: 0 | Status: SHIPPED | OUTPATIENT
Start: 2023-02-23 | End: 2023-02-23

## 2023-02-23 RX ORDER — FAMOTIDINE 20 MG/1
TABLET, FILM COATED ORAL
COMMUNITY
Start: 2023-02-02

## 2023-02-23 ASSESSMENT — ENCOUNTER SYMPTOMS: ANAL BLEEDING: 1

## 2023-02-23 NOTE — PATIENT INSTRUCTIONS
SURVEY:    You may be receiving a survey from [employer] regarding your visit today. Please complete the survey to enable us to provide the highest quality of care to you and your family. If you cannot score us a very good on any question, please call the office to discuss how we could have made your experience a very good one. Thank you.   MD Adriana Mendoza MD Marquis Shin, MD Pattricia Kid, [address], PM  [facility] [place], [address] [place] [address], [address] [place] [address]

## 2023-02-23 NOTE — PROGRESS NOTES
[address]    Chief Complaint   Patient presents with    Hemorrhoids     Patient believes the hemorrhoids are internal. Patient was having bleeding but not having any at this time. No blood with BM's. Sometimes bowel movements are hard and painful but denies constipation. Patient states daily BM's. HPI  Courtney Harvey is a 39-year-old woman with a past medical history of hypertension, hypothyroidism who presents with a complaint of rectal bleeding. Her daughter is also present for the visit and states that her mother has been complaining of rectal bleeding for the past 1 month. He states that her mother has a history of hemorrhoids and also has a sitz bath basin that she uses at home.   Her last colonoscopy was in 2015 and was deemed normal.     Family history of colon cancer: None reported  Blood in stool: No  Unintentional weight loss: No  Abdominal pain: Yes  Prior colonoscopy: Yes - 2015    EGD, colonoscopy 07/20/2015  Diffuse gastritis  Normal colonoscopy repeat in 10 years    Past Medical History:   Diagnosis Date    Arthritis     Asthma     Chickenpox     Hypertension     Hypothyroidism     Measles     Mumps     Osteoarthritis     Smallpox     Ulcer     Ulcer     Whooping cough          Past Surgical History:   Procedure Laterality Date    CHOLECYSTECTOMY, LAPAROSCOPIC N/A 3/4/2019    CHOLECYSTECTOMY LAPAROSCOPIC performed by Meagan White MD at [address]  07/20/2015    Elaina Lorenz MD    HYSTERECTOMY (CERVIX STATUS UNKNOWN)  2006    Vaginal hysterectomy dr Maribel Salazar ENDOSCOPY  07/20/2015    Elaina Lorenz MD         Current Outpatient Medications   Medication Sig Dispense Refill    polyethylene glycol-electrolytes (NULYTELY) 420 g solution Take 4,000 mLs by mouth once for 1 dose 4000 mL 0    levothyroxine (SYNTHROID) 50 MCG tablet take 1 tablet by mouth once daily 90 tablet 1    dilTIAZem (CARDIZEM) 30 MG tablet take 1 tablet by mouth twice a day 180 tablet 1    estrogens conjugated (PREMARIN) 0.625 MG/GM CREA vaginal cream Apply 2 gram intravaginally once a day at bed time x 2 wks  And then, 1 gram once a day x 7 days as needed 42.5 g 0    phenylephrine-mineral oil-petrolatum (HEMORRHOIDAL) 0.25-14-74.9 % rectal ointment Place rectally 2 times daily as needed for Hemorrhoids 28 g 0    rivaroxaban (XARELTO) 20 MG TABS tablet take 1 tablet by mouth every morning with breakfast 90 tablet 1    vitamin D3 (CHOLECALCIFEROL) 25 MCG (1000 UT) TABS tablet Take 1 tablet by mouth daily 30 tablet 5    diclofenac sodium (VOLTAREN) 1 % GEL Apply topically 2 times daily 150 g 3    lidocaine (XYLOCAINE) 5 % ointment Apply topically as needed every 2 hours 1 each 3    hydrocortisone (ANUSOL-HC) 25 MG suppository Place 1 suppository rectally every 12 hours 10 suppository 1    famotidine (PEPCID) 20 MG tablet take 1 tablet by mouth twice a day      ibuprofen (ADVIL;MOTRIN) 800 MG tablet take 1 tablet by mouth three times a day with food or milk if needed (Patient not taking: No sig reported)      docusate sodium (COLACE) 100 MG capsule take 1 capsule by mouth daily (Patient not taking: No sig reported) 30 capsule 3     No current facility-administered medications for this visit.          Family History   Problem Relation Age of Onset    Diabetes Father           Social Determinants of Health     Tobacco Use: Low Risk     Smoking Tobacco Use: Never    Smokeless Tobacco Use: Never    Passive Exposure: Not on file   Alcohol Use: Not At Risk    Frequency of Alcohol Consumption: Never    Average Number of Drinks: Patient does not drink    Frequency of Binge Drinking: Never   Financial Resource Strain: Low Risk     Difficulty of Paying Living Expenses: Not hard at all   Food Insecurity: No Food Insecurity    Worried About Running Out of Food in the Last Year: Never true    Ran Out of Food in the Last Year: Never true   Transportation Needs: Not on file   Physical Activity: Insufficiently Active    Days of Exercise per Week: 7 days    Minutes of Exercise per Session: 10 min   Stress: Not on file   Social Connections: Not on file   Intimate Partner Violence: Not on file   Depression: Not at risk    PHQ-2 Score: 0   Housing Stability: Not on file       Review of Systems   Respiratory:          Asthma   Cardiovascular:         Hypertension   Gastrointestinal:  Positive for anal bleeding.   Endocrine:        Hypothyroidism   Musculoskeletal:  Positive for arthralgias.   All other systems reviewed and are negative.     Pulse 67   Resp 19   Wt 191 lb (86.6 kg)   SpO2 97%   BMI 30.83 kg/m²     Physical Exam  Constitutional:       Appearance: Normal appearance.   HENT:      Head: Normocephalic.      Right Ear: External ear normal.      Left Ear: External ear normal.      Nose: Nose normal.      Mouth/Throat:      Mouth: Mucous membranes are moist.   Eyes:      Extraocular Movements: Extraocular movements intact.      Pupils: Pupils are equal, round, and reactive to light.   Cardiovascular:      Rate and Rhythm: Normal rate and regular rhythm.      Pulses: Normal pulses.   Pulmonary:      Effort: Pulmonary effort is normal.   Abdominal:      General: Abdomen is flat. Bowel sounds are normal.   Musculoskeletal:         General: Normal range of motion.      Cervical back: Normal range of motion.   Neurological:      General: No focal deficit present.      Mental Status: She is alert.   Psychiatric:         Mood and Affect: Mood normal.         Lab Results   Component Value Date    WBC 12.1 (H) 01/25/2023    HGB 14.8 01/25/2023    HCT 45.8 01/25/2023    MCV 87.5 01/25/2023     01/25/2023        Lab Results   Component Value Date     01/25/2023    K 4.3 01/25/2023     01/25/2023    CO2 31 01/25/2023    BUN 14 01/25/2023    CREATININE 0.70 01/25/2023    GLUCOSE 100 (H) 01/25/2023    CALCIUM 9.2 01/25/2023    PROT 7.9 01/25/2023    LABALBU 4.0 01/25/2023    BILITOT 0.5  01/25/2023    ALKPHOS 111 (H) 01/25/2023    AST 14 01/25/2023    ALT 25 01/25/2023    LABGLOM >60 01/25/2023    GFRAA >60 01/19/2022    GLOB NOT REPORTED 12/09/2021       Assessment    Courtney Rivera is a 26-year-old woman with a past medical history of hypertension, hypothyroidism who presents with a complaint of rectal bleeding. During her history of rectal bleeding and abdominal pain it is necessary to perform a screening colonoscopy to rule out malignancy, of colitis or Crohn's disease vs. hemorrhoids. Plan    1. Colon cancer screening  - polyethylene glycol-electrolytes (NULYTELY) 420 g solution; Take 4,000 mLs by mouth once for 1 dose  Dispense: 4000 mL; Refill: 0  - COLONOSCOPY (Screening); Future    2. Rectal bleeding  - polyethylene glycol-electrolytes (NULYTELY) 420 g solution; Take 4,000 mLs by mouth once for 1 dose  Dispense: 4000 mL; Refill: 0  - COLONOSCOPY (Screening); Future    3. Additional recommendations based on findings    Informed consent was obtained with a discussion about potential risks and complications of the procedure. Patient verbalized understanding and willingness to continue with the procedure scheduling. Spent 20 minutes with the patient with greater than 50 percent of the time was spent on face-to-face time in discussion with the patient regarding diagnostic options/results, treatment options, counseling, and follow-up plan.       Darline Scales MD

## 2023-03-25 DIAGNOSIS — I48.91 ATRIAL FIBRILLATION, UNSPECIFIED TYPE (HCC): ICD-10-CM

## 2023-03-30 RX ORDER — MELATONIN
Qty: 30 TABLET | Refills: 5 | Status: SHIPPED | OUTPATIENT
Start: 2023-03-30

## 2023-06-13 NOTE — PROGRESS NOTES
[facility]   Preadmission Testing    Name: Poncho Stack  : 1955  Patient Phone: [phone] (home) [phone] (work)    Procedure: Colonoscopy  Date of Procedure: 6/15/2023  Surgeon: Jt Falcon MD    Ht:  5' 6\" (167.6 cm)  Wt: 198 lb (89.8 kg)  Wt method: Stated    Allergies: No Known Allergies      Call daughter Pau Arrington- [phone] with arrival time       There were no vitals filed for this visit. No LMP recorded. Patient has had a hysterectomy. Do you take blood thinners? [x] Yes    [] No         Instructed to stop blood thinners prior to procedure? [x] Yes    [] No      [] N/A   Do you have sleep apnea? [] Yes    [x] No     Do you have acid reflux ? [x] Yes    [] No     Do you have  hiatal hernia? [] Yes    [x] No    Do you ever experience motion sickness? [] Yes    [x] No     Have you had a respiratory infection or sore throat in last 4 weeks before surgery? [] Yes    [x] No     Do you have poorly controlled asthma or COPD? Difficulty with intubation in past? [] Yes    [x] No      [] Yes    [x] No       Do you have a history of angina in the last month or symptomatic arrhythmia? [] Yes    [x] No     Do you have significant central nervous system disease? [] Yes    [x] No     Have you had an EKG, labs, or chest xray in last 12 months? If yes provide copies to anesthesia   [x] Yes    [] No       [] Lab    [x] EKG    [] CXR     Have you had a stress test?     [] Yes    [x] No    When/where:    Was it normal?    [] Yes    [] No     Do you or your family have a history of Malignant Hyperthermia? [] Yes    [x] No           Do you smoke? [] Yes    [x] No      Please refrain from smoking on the day of surgery.       Patient instructed on: [x] NPO Status   [x] Meds to Take  [x] Ride Home  [x]No Jewelry/Contact Lenses/Nail [place]  [x] Prep/Lax/Clear Liquids    [] Chlorhexidene     DOS Patient Needs [] HCG   [] Blood Sugar  [] PT/INR    [] T&S       COVID

## 2023-06-15 ENCOUNTER — HOSPITAL ENCOUNTER (OUTPATIENT)
Age: 68
Setting detail: OUTPATIENT SURGERY
Discharge: HOME | End: 2023-06-15
Attending: INTERNAL MEDICINE | Admitting: INTERNAL MEDICINE
Payer: MEDICARE

## 2023-06-15 VITALS
SYSTOLIC BLOOD PRESSURE: 107 MMHG | TEMPERATURE: 97 F | RESPIRATION RATE: 13 BRPM | BODY MASS INDEX: 31.66 KG/M2 | OXYGEN SATURATION: 100 % | DIASTOLIC BLOOD PRESSURE: 93 MMHG | HEIGHT: 66 IN | WEIGHT: 197 LBS | HEART RATE: 61 BPM

## 2023-06-15 DIAGNOSIS — K62.5 RECTAL BLEEDING: ICD-10-CM

## 2023-06-15 DIAGNOSIS — Z12.11 COLON CANCER SCREENING: ICD-10-CM

## 2023-06-15 PROCEDURE — 88305 TISSUE EXAM BY PATHOLOGIST: CPT

## 2023-06-15 PROCEDURE — 7100000010 HC PHASE II RECOVERY - FIRST 15 MIN: Performed by: INTERNAL MEDICINE

## 2023-06-15 PROCEDURE — 2709999900 HC NON-CHARGEABLE SUPPLY: Performed by: INTERNAL MEDICINE

## 2023-06-15 PROCEDURE — 3700000000 HC ANESTHESIA ATTENDED CARE: Performed by: INTERNAL MEDICINE

## 2023-06-15 PROCEDURE — 88312 SPECIAL STAINS GROUP 1: CPT

## 2023-06-15 PROCEDURE — 3700000001 HC ADD 15 MINUTES (ANESTHESIA): Performed by: INTERNAL MEDICINE

## 2023-06-15 PROCEDURE — C1889 IMPLANT/INSERT DEVICE, NOC: HCPCS | Performed by: INTERNAL MEDICINE

## 2023-06-15 PROCEDURE — 2580000003 HC RX 258: Performed by: INTERNAL MEDICINE

## 2023-06-15 PROCEDURE — 88313 SPECIAL STAINS GROUP 2: CPT

## 2023-06-15 PROCEDURE — 7100000011 HC PHASE II RECOVERY - ADDTL 15 MIN: Performed by: INTERNAL MEDICINE

## 2023-06-15 PROCEDURE — 3609010600 HC COLONOSCOPY POLYPECTOMY SNARE/COLD BIOPSY: Performed by: INTERNAL MEDICINE

## 2023-06-15 RX ORDER — SODIUM CHLORIDE, SODIUM LACTATE, POTASSIUM CHLORIDE, CALCIUM CHLORIDE 600; 310; 30; 20 MG/100ML; MG/100ML; MG/100ML; MG/100ML
INJECTION, SOLUTION INTRAVENOUS CONTINUOUS
Status: DISCONTINUED | OUTPATIENT
Start: 2023-06-15 | End: 2023-06-15 | Stop reason: HOSPADM

## 2023-06-15 RX ADMIN — SODIUM CHLORIDE, POTASSIUM CHLORIDE, SODIUM LACTATE AND CALCIUM CHLORIDE: 600; 310; 30; 20 INJECTION, SOLUTION INTRAVENOUS at 11:09

## 2023-06-15 ASSESSMENT — PAIN - FUNCTIONAL ASSESSMENT: PAIN_FUNCTIONAL_ASSESSMENT: NONE - DENIES PAIN

## 2023-06-15 NOTE — PROGRESS NOTES
Clips were placed in the cecal colon as part of the polyp removal process; these clips are metal and can interfere with MRI imaging; the clips will pass as part of a bowel movement after several weeks. Until that time, the patient has been given an implant card stating when and where the clips are located. The patient was instructed to present the card to radiology prior to having any MRI imaging taken.

## 2023-06-15 NOTE — DISCHARGE INSTRUCTIONS
Discharge Instructions for Colonoscopy     Colonoscopy is a visual exam of the lining of the large intestine, also called the bowel or colon, with a colonoscope. A colonoscope is a flexible tube with a light and a viewing device. It allows the doctor to view the inside of the colon through a tiny video camera. Colonoscopy is performed for many reasons: unexplained anemia , pain, diarrhea , bloody stools, cancer screening, among many other reasons. Complications from a colonoscopy are rare. Some possible serious complications include perforated bowel (which might require surgery) and bleeding (which could require blood transfusion ). Minor complications include bloating, gas, and cramping that can last for 1-2 days after the procedure. Because air is put into your colon during the procedure, it is normal to pass large amounts of air from your rectum. You may not have a bowel movement for 1-3 days after the procedure. What You Will Need    Someone to drive you home after the procedure   Steps to [facility] when you get home. Because the sedative will make you drowsy, don't drive, operate machinery, or make important decisions the day of the procedure. Feelings of bloating, gas, or cramping may persist for 24 hours. Diet     Try sips of water first. If tolerated, resume regular diet or the diet recommended by your physician. Do not drink alcohol for 24 hours. Physical Activity     Ask your doctor when you will be able to return to work. Do not drive, operate heavy machinery, or do activities that require coordination or balance for 24 hours. Otherwise, return to your normal routine as soon as you are comfortable to do so, which is usually the next day after the procedure. Medications    When taking medications, it's important to: Take your medication as directednot more, not less, not at a different time.     Do not stop taking them without consulting your

## 2023-06-15 NOTE — CONSULTS
Session ID: [id number]  Request ID: [id number]  Language: Hungarian  Status: Fulfilled   ID: #[id number]   Name: Gwyn Flores

## 2023-06-15 NOTE — PROGRESS NOTES
Discharge Criteria  Outpatients must meet criteria 1 through 7. Up to restroom, void sufficient amount. Yes. Gait steady when up. 1.  Minimum 30 minutes after last dose of sedative medication, minimum 120 minutes after last dose of reversal agent. Yes    2. Systolic BP stable within 20 mmHg for 30 minutes & systolic BP between 90 & 018 or within 10 mmHg of baseline. Yes    3. Pulse between 60 and 100 or within 10 bpm of baseline. Yes    4. Spontaneous respiratory rate >/= 10 per minute. Yes    5. SaO2 >/= 95 or  >/= baseline. Yes    6. Able to cough and swallow or return to baseline function. Yes    7. Alert and oriented or return to baseline mental status. Yes    8. Demonstrates controlled, coordinated movements, ambulates with steady gait, or return to baseline activity function. Yes    9. Minimal or no pain or nausea, or at a level tolerable and acceptable to patient. Yes    10. Takes and retains oral fluids as allowed. Yes    11. Procedural / perioperative site stable. Minimal or no bleeding. Yes        12. If GI endoscopy procedure, minimal or no abdominal distention or passing flatus. Yes    13. Written discharge instructions and emergency telephone number provided. Yes    14. Accompanied by a responsible adult. Yes    Adult patient discharged from facility without responsible person meets above criteria plus the following:   a) remains awake without stimulus for 30 minutes     b) oriented appropriate for age      c) all vital signs stable   d) no significant risk of losing protective reflexes      e) able to maintain pre-procedure mobility without assistance   f) no nausea or dizziness      g) transportation arrangements that do not require patient to operate motor   Vehicle.   Yes

## 2023-06-15 NOTE — OP NOTE
sigmoid diverticulosis. Colon polyps were removed:  Cecum: 7mm sessile polyp was removed with a cold snare and one clip was placed. Ascending colon: 4mm sessile polyp was removed with cold biopsy forceps. It could not be retrieved from the scope. Rectum: 5mm, 6mm sessile polyps removed with cold biopsy forceps. Retroflexion was performed in the rectum and moderate non-bleeding internal hemorrhoids were noted. Withdrawal time was 17 minutes. IMPRESSION:   Colon polyps  Internal hemorrhoids  Diverticulosis     RECOMMENDATIONS:   1) Follow up with referring provider, as previously scheduled.    2) Repeat Colonoscopy in based on pathology    Electronically signed by Steffi Cordero MD on 6/15/2023 at 12:12 PM

## 2023-06-15 NOTE — H&P
Never used   Substance and Sexual Activity    Alcohol use: No    Drug use: No    Sexual activity: Yes     Partners: Male   Other Topics Concern    Not on file   Social History Narrative    Not on file     Social Determinants of Health     Financial Resource Strain: Low Risk     Difficulty of Paying Living Expenses: Not hard at all   Food Insecurity: No Food Insecurity    Worried About Running Out of Food in the Last Year: Never true    Ran Out of Food in the Last Year: Never true   Transportation Needs: Not on file   Physical Activity: Insufficiently Active    Days of Exercise per Week: 7 days    Minutes of Exercise per Session: 10 min   Stress: Not on file   Social Connections: Not on file   Intimate Partner Violence: Not on file   Housing Stability: Not on file     ROS: Non-contributory    Physical Exam:  There were no vitals filed for this visit. Chest: Breath sounds were clear and equal with no rales, wheezes, or rhonchi. Respiratory effort was normal with no retractions or use of accessory muscles. Cardiovascular: Heart sounds were normal with a regular rate and rhythm. There were no murmurs, gallops or rubs. Abdomen: Bowel sounds were normal.  The abdomen was soft and non distended. There was no tenderness, guarding, rebound, or rigidity. There were no masses, hepatosplenomegaly, or hernias. Assessment    Courtney Hearn is a 68-year-old woman with a past medical history of hypertension, hypothyroidism who presents with a complaint of rectal bleeding. During her history of rectal bleeding and abdominal pain it is necessary to perform a screening colonoscopy to rule out malignancy, of colitis or Crohn's disease vs. hemorrhoids. ASA 2, Mallampati score 2. Plan    1.  Colon cancer screening  - COLONOSCOPY (Screening);      2. Rectal bleeding  - COLONOSCOPY (Screening);      3. Additional recommendations based on findings      VERIFICATION OF CONSENT    The patient was counseled regarding the

## 2023-06-15 NOTE — CONSULTS
Session ID: [id number]  Request ID: [id number]  Language: Icelandic  Status: Fulfilled   ID: #[id number]   Name: Chris Thornton

## 2023-06-20 LAB — SURGICAL PATHOLOGY REPORT: NORMAL

## 2023-06-23 DIAGNOSIS — I48.0 PAROXYSMAL ATRIAL FIBRILLATION (HCC): ICD-10-CM

## 2023-06-23 DIAGNOSIS — M79.18 INTERCOSTAL MUSCLE PAIN: ICD-10-CM

## 2023-06-29 DIAGNOSIS — I48.91 ATRIAL FIBRILLATION, UNSPECIFIED TYPE (HCC): ICD-10-CM

## 2023-06-29 DIAGNOSIS — M79.18 INTERCOSTAL MUSCLE PAIN: ICD-10-CM

## 2023-07-07 ENCOUNTER — TELEPHONE (OUTPATIENT)
Dept: GASTROENTEROLOGY | Age: 68
End: 2023-07-07

## 2023-07-07 NOTE — TELEPHONE ENCOUNTER
----- Message from Armaan Doll MD sent at 7/6/2023 11:48 PM EDT -----  Please notify patient: Tubular adenomas are on a pre-cancerous spectrum. Hyperplastic polyps are polyps which are not on a pre-cancerous spectrum. Repeat interval for colonoscopy is 5 years.

## 2023-08-16 DIAGNOSIS — E03.9 ACQUIRED HYPOTHYROIDISM: ICD-10-CM

## 2023-08-16 RX ORDER — LEVOTHYROXINE SODIUM 0.05 MG/1
TABLET ORAL
Qty: 90 TABLET | Refills: 1 | Status: SHIPPED | OUTPATIENT
Start: 2023-08-16

## 2023-08-21 DIAGNOSIS — E03.9 ACQUIRED HYPOTHYROIDISM: ICD-10-CM

## 2023-08-21 DIAGNOSIS — N90.4 VULVAR DYSTROPHY: ICD-10-CM

## 2023-08-21 RX ORDER — LEVOTHYROXINE SODIUM 0.05 MG/1
50 TABLET ORAL DAILY
Qty: 90 TABLET | Refills: 1 | Status: SHIPPED | OUTPATIENT
Start: 2023-08-21

## 2023-08-21 RX ORDER — LIDOCAINE 50 MG/G
OINTMENT TOPICAL
Qty: 1 EACH | Refills: 3 | Status: SHIPPED | OUTPATIENT
Start: 2023-08-21

## 2023-09-22 ENCOUNTER — OFFICE VISIT (OUTPATIENT)
Dept: FAMILY MEDICINE CLINIC | Age: 68
End: 2023-09-22

## 2023-09-22 VITALS
OXYGEN SATURATION: 96 % | HEART RATE: 78 BPM | RESPIRATION RATE: 18 BRPM | SYSTOLIC BLOOD PRESSURE: 128 MMHG | HEIGHT: 66 IN | WEIGHT: 183.6 LBS | DIASTOLIC BLOOD PRESSURE: 78 MMHG | BODY MASS INDEX: 29.51 KG/M2

## 2023-09-22 DIAGNOSIS — K52.9 CHRONIC DIARRHEA: Primary | ICD-10-CM

## 2023-09-22 DIAGNOSIS — F41.9 ANXIETY: ICD-10-CM

## 2023-09-22 DIAGNOSIS — F32.1 CURRENT MODERATE EPISODE OF MAJOR DEPRESSIVE DISORDER, UNSPECIFIED WHETHER RECURRENT (HCC): ICD-10-CM

## 2023-09-22 RX ORDER — LOPERAMIDE HYDROCHLORIDE 2 MG/1
2 CAPSULE ORAL 4 TIMES DAILY PRN
Qty: 40 CAPSULE | Refills: 0 | Status: SHIPPED | OUTPATIENT
Start: 2023-09-22 | End: 2023-10-02

## 2023-09-22 RX ORDER — L. ACIDOPHILUS/L.BULGARICUS 1MM CELL
1 TABLET ORAL 2 TIMES DAILY
Qty: 60 TABLET | Refills: 5 | Status: SHIPPED | OUTPATIENT
Start: 2023-09-22

## 2023-09-22 RX ORDER — CITALOPRAM HYDROBROMIDE 10 MG/1
10 TABLET ORAL DAILY
Qty: 30 TABLET | Refills: 3 | Status: SHIPPED | OUTPATIENT
Start: 2023-09-22

## 2023-09-22 SDOH — ECONOMIC STABILITY: INCOME INSECURITY: HOW HARD IS IT FOR YOU TO PAY FOR THE VERY BASICS LIKE FOOD, HOUSING, MEDICAL CARE, AND HEATING?: NOT HARD AT ALL

## 2023-09-22 SDOH — ECONOMIC STABILITY: FOOD INSECURITY: WITHIN THE PAST 12 MONTHS, YOU WORRIED THAT YOUR FOOD WOULD RUN OUT BEFORE YOU GOT MONEY TO BUY MORE.: NEVER TRUE

## 2023-09-22 SDOH — ECONOMIC STABILITY: FOOD INSECURITY: WITHIN THE PAST 12 MONTHS, THE FOOD YOU BOUGHT JUST DIDN'T LAST AND YOU DIDN'T HAVE MONEY TO GET MORE.: NEVER TRUE

## 2023-09-22 SDOH — ECONOMIC STABILITY: HOUSING INSECURITY
IN THE LAST 12 MONTHS, WAS THERE A TIME WHEN YOU DID NOT HAVE A STEADY PLACE TO SLEEP OR SLEPT IN A SHELTER (INCLUDING NOW)?: NO

## 2023-09-22 ASSESSMENT — PATIENT HEALTH QUESTIONNAIRE - PHQ9
2. FEELING DOWN, DEPRESSED OR HOPELESS: 0
7. TROUBLE CONCENTRATING ON THINGS, SUCH AS READING THE NEWSPAPER OR WATCHING TELEVISION: 0
3. TROUBLE FALLING OR STAYING ASLEEP: 0
10. IF YOU CHECKED OFF ANY PROBLEMS, HOW DIFFICULT HAVE THESE PROBLEMS MADE IT FOR YOU TO DO YOUR WORK, TAKE CARE OF THINGS AT HOME, OR GET ALONG WITH OTHER PEOPLE: 0
SUM OF ALL RESPONSES TO PHQ QUESTIONS 1-9: 0
SUM OF ALL RESPONSES TO PHQ9 QUESTIONS 1 & 2: 0
6. FEELING BAD ABOUT YOURSELF - OR THAT YOU ARE A FAILURE OR HAVE LET YOURSELF OR YOUR FAMILY DOWN: 0
SUM OF ALL RESPONSES TO PHQ QUESTIONS 1-9: 0
4. FEELING TIRED OR HAVING LITTLE ENERGY: 0
5. POOR APPETITE OR OVEREATING: 0
8. MOVING OR SPEAKING SO SLOWLY THAT OTHER PEOPLE COULD HAVE NOTICED. OR THE OPPOSITE, BEING SO FIGETY OR RESTLESS THAT YOU HAVE BEEN MOVING AROUND A LOT MORE THAN USUAL: 0
9. THOUGHTS THAT YOU WOULD BE BETTER OFF DEAD, OR OF HURTING YOURSELF: 0
SUM OF ALL RESPONSES TO PHQ QUESTIONS 1-9: 0
SUM OF ALL RESPONSES TO PHQ QUESTIONS 1-9: 0
1. LITTLE INTEREST OR PLEASURE IN DOING THINGS: 0

## 2023-09-22 NOTE — PROGRESS NOTES
HPI Notes    Name: Abdullahi Menchaca  : 1955         Chief Complaint:     Chief Complaint   Patient presents with    Diarrhea     Patients states she has loose stools 8-10 times a day. Patient is on a health kick and eats all healthy foods. Patient states this every day no breaks. History of Present Illness:        [facility]      Dede Tucker presents to office for evaluation of chronic epigastric pain, diarrhea and anxiety. She is a pleasant Kinyarwanda speaking woman accompanied by her daughter Vipul Elias who is helping with interpretation. Dede Tucker has been experiencing intermittent epigastric abdominal pain for many years. She has had an extensive work up for this problem including recent colonoscopy in  by Dr. Adriane Tiwari. Colonoscopy revealed colon polyps and internal hemorrhoids as well as diverticulosis. She also had EGD and colonoscopy on 2015 with EGD revealing diffuse gastritis. Patient states that she is taking Pepcid. She reports no belching, no heartburn. She is very careful with her diet and prepares her meals at home,appetite has been good, no weight loss. Has a h/o chronic diarrhea for many years. Some days has 6-8 watery stools. She reports no blood in her stool. She does have bothersome hemorrhoids but recently they have been stable, no bleeding. She tries to avoid spicy food, greasy and fried food. She make sure that she has enough fiber in her diet. Tried Metamucil. Her daughter states that Dede Tucker suffers from anxiety. She apparently worries about \"everything and everybody\" although all is well with her children and grandchildren. Daughter believes that patient's pain and diarrhea could be related to anxiety and would like the pt to try some meds. Patient does admit feeling \" tense and stressed\" most of the days and she does not mind to take some anxiety meds to see if it helps.          Past Medical History:     Past Medical History:   Diagnosis Date    Arthritis     Asthma

## 2023-09-24 ASSESSMENT — ENCOUNTER SYMPTOMS
SHORTNESS OF BREATH: 0
COUGH: 0
NAUSEA: 0
BLOOD IN STOOL: 0
VOMITING: 0
ABDOMINAL DISTENTION: 0
SORE THROAT: 0
ABDOMINAL PAIN: 1
DIARRHEA: 1
TROUBLE SWALLOWING: 0

## 2024-01-15 RX ORDER — CITALOPRAM HYDROBROMIDE 10 MG/1
10 TABLET ORAL DAILY
Qty: 30 TABLET | Refills: 3 | Status: SHIPPED | OUTPATIENT
Start: 2024-01-15

## 2024-01-19 ENCOUNTER — HOSPITAL ENCOUNTER (OUTPATIENT)
Age: 69
Discharge: HOME | End: 2024-01-19
Payer: MEDICARE

## 2024-01-19 ENCOUNTER — HOSPITAL ENCOUNTER (OUTPATIENT)
Age: 69
End: 2024-01-19
Payer: MEDICARE

## 2024-01-19 ENCOUNTER — HOSPITAL ENCOUNTER (OUTPATIENT)
Dept: GENERAL RADIOLOGY | Age: 69
End: 2024-01-19
Payer: MEDICARE

## 2024-01-19 DIAGNOSIS — I48.0 PAROXYSMAL ATRIAL FIBRILLATION (HCC): ICD-10-CM

## 2024-01-19 DIAGNOSIS — I10 PRIMARY HYPERTENSION: ICD-10-CM

## 2024-01-19 DIAGNOSIS — J45.901 ASTHMA WITH ACUTE EXACERBATION, UNSPECIFIED ASTHMA SEVERITY, UNSPECIFIED WHETHER PERSISTENT: ICD-10-CM

## 2024-01-19 DIAGNOSIS — E03.9 HYPOTHYROIDISM, UNSPECIFIED TYPE: ICD-10-CM

## 2024-01-19 LAB
ALBUMIN SERPL-MCNC: 4 G/DL (ref 3.5–5.2)
ALP SERPL-CCNC: 95 U/L (ref 35–104)
ALT SERPL-CCNC: 26 U/L (ref 5–33)
ANION GAP SERPL CALCULATED.3IONS-SCNC: 9 MMOL/L (ref 9–17)
AST SERPL-CCNC: 18 U/L
BASOPHILS # BLD: 0.04 K/UL (ref 0–0.2)
BASOPHILS NFR BLD: 1 % (ref 0–2)
BILIRUB SERPL-MCNC: 0.5 MG/DL (ref 0.3–1.2)
BUN SERPL-MCNC: 14 MG/DL (ref 8–23)
BUN/CREAT SERPL: 18 (ref 9–20)
CALCIUM SERPL-MCNC: 9.5 MG/DL (ref 8.6–10.4)
CHLORIDE SERPL-SCNC: 104 MMOL/L (ref 98–107)
CO2 SERPL-SCNC: 28 MMOL/L (ref 20–31)
CREAT SERPL-MCNC: 0.8 MG/DL (ref 0.5–0.9)
EKG ATRIAL RATE: 61 BPM
EKG P AXIS: 76 DEGREES
EKG P-R INTERVAL: 160 MS
EKG Q-T INTERVAL: 416 MS
EKG QRS DURATION: 112 MS
EKG QTC CALCULATION (BAZETT): 418 MS
EKG R AXIS: 94 DEGREES
EKG T AXIS: 80 DEGREES
EKG VENTRICULAR RATE: 61 BPM
EOSINOPHIL # BLD: 0 K/UL (ref 0–0.4)
EOSINOPHILS RELATIVE PERCENT: 0 % (ref 0–5)
ERYTHROCYTE [DISTWIDTH] IN BLOOD BY AUTOMATED COUNT: 14.2 % (ref 12.1–15.2)
GFR SERPL CREATININE-BSD FRML MDRD: >60 ML/MIN/1.73M2
GLUCOSE SERPL-MCNC: 99 MG/DL (ref 70–99)
HCT VFR BLD AUTO: 45.5 % (ref 36–46)
HGB BLD-MCNC: 14.7 G/DL (ref 12–16)
IMM GRANULOCYTES # BLD AUTO: 0.01 K/UL (ref 0–0.3)
IMM GRANULOCYTES NFR BLD: 0 % (ref 0–5)
LYMPHOCYTES NFR BLD: 1.47 K/UL (ref 1–4.8)
LYMPHOCYTES RELATIVE PERCENT: 26 % (ref 15–40)
MAGNESIUM SERPL-MCNC: 2.4 MG/DL (ref 1.6–2.6)
MCH RBC QN AUTO: 27.3 PG (ref 26–34)
MCHC RBC AUTO-ENTMCNC: 32.3 G/DL (ref 31–37)
MCV RBC AUTO: 84.4 FL (ref 80–100)
MONOCYTES NFR BLD: 0.5 K/UL (ref 0–1)
MONOCYTES NFR BLD: 9 % (ref 4–8)
NEUTROPHILS NFR BLD: 64 % (ref 47–75)
NEUTS SEG NFR BLD: 3.72 K/UL (ref 2.5–7)
PLATELET # BLD AUTO: 291 K/UL (ref 140–450)
PMV BLD AUTO: 10.3 FL (ref 6–12)
POTASSIUM SERPL-SCNC: 4.8 MMOL/L (ref 3.7–5.3)
PROT SERPL-MCNC: 8.6 G/DL (ref 6.4–8.3)
RBC # BLD AUTO: 5.39 M/UL (ref 4–5.2)
SODIUM SERPL-SCNC: 141 MMOL/L (ref 135–144)
TSH SERPL DL<=0.05 MIU/L-ACNC: 2.71 UIU/ML (ref 0.3–5)
WBC OTHER # BLD: 5.7 K/UL (ref 3.5–11)

## 2024-01-19 PROCEDURE — 80061 LIPID PANEL: CPT

## 2024-01-19 PROCEDURE — 85025 COMPLETE CBC W/AUTO DIFF WBC: CPT

## 2024-01-19 PROCEDURE — 83735 ASSAY OF MAGNESIUM: CPT

## 2024-01-19 PROCEDURE — 71046 X-RAY EXAM CHEST 2 VIEWS: CPT

## 2024-01-19 PROCEDURE — 80053 COMPREHEN METABOLIC PANEL: CPT

## 2024-01-19 PROCEDURE — 36415 COLL VENOUS BLD VENIPUNCTURE: CPT

## 2024-01-19 PROCEDURE — 84443 ASSAY THYROID STIM HORMONE: CPT

## 2024-01-20 LAB
CHOLEST SERPL-MCNC: 166 MG/DL (ref 0–199)
CHOLESTEROL/HDL RATIO: 3
HDLC SERPL-MCNC: 51 MG/DL
LDLC SERPL CALC-MCNC: 96 MG/DL (ref 0–100)
TRIGL SERPL-MCNC: 95 MG/DL
VLDLC SERPL CALC-MCNC: 19 MG/DL

## 2024-01-22 ENCOUNTER — OFFICE VISIT (OUTPATIENT)
Dept: CARDIOLOGY CLINIC | Age: 69
End: 2024-01-22
Payer: MEDICARE

## 2024-01-22 VITALS
SYSTOLIC BLOOD PRESSURE: 126 MMHG | WEIGHT: 192 LBS | HEART RATE: 75 BPM | BODY MASS INDEX: 30.99 KG/M2 | DIASTOLIC BLOOD PRESSURE: 72 MMHG | OXYGEN SATURATION: 96 %

## 2024-01-22 DIAGNOSIS — E55.9 VITAMIN D DEFICIENCY, UNSPECIFIED: ICD-10-CM

## 2024-01-22 DIAGNOSIS — I48.0 PAROXYSMAL ATRIAL FIBRILLATION (HCC): Primary | ICD-10-CM

## 2024-01-22 DIAGNOSIS — E03.9 HYPOTHYROIDISM, UNSPECIFIED TYPE: ICD-10-CM

## 2024-01-22 DIAGNOSIS — I10 PRIMARY HYPERTENSION: ICD-10-CM

## 2024-01-22 LAB
EKG ATRIAL RATE: 61 BPM
EKG P AXIS: 76 DEGREES
EKG P-R INTERVAL: 160 MS
EKG Q-T INTERVAL: 416 MS
EKG QRS DURATION: 112 MS
EKG QTC CALCULATION (BAZETT): 418 MS
EKG R AXIS: 94 DEGREES
EKG T AXIS: 80 DEGREES
EKG VENTRICULAR RATE: 61 BPM

## 2024-01-22 PROCEDURE — 1090F PRES/ABSN URINE INCON ASSESS: CPT | Performed by: INTERNAL MEDICINE

## 2024-01-22 PROCEDURE — 3074F SYST BP LT 130 MM HG: CPT | Performed by: INTERNAL MEDICINE

## 2024-01-22 PROCEDURE — G8417 CALC BMI ABV UP PARAM F/U: HCPCS | Performed by: INTERNAL MEDICINE

## 2024-01-22 PROCEDURE — 1123F ACP DISCUSS/DSCN MKR DOCD: CPT | Performed by: INTERNAL MEDICINE

## 2024-01-22 PROCEDURE — 3017F COLORECTAL CA SCREEN DOC REV: CPT | Performed by: INTERNAL MEDICINE

## 2024-01-22 PROCEDURE — 99214 OFFICE O/P EST MOD 30 MIN: CPT | Performed by: INTERNAL MEDICINE

## 2024-01-22 PROCEDURE — G8484 FLU IMMUNIZE NO ADMIN: HCPCS | Performed by: INTERNAL MEDICINE

## 2024-01-22 PROCEDURE — 3078F DIAST BP <80 MM HG: CPT | Performed by: INTERNAL MEDICINE

## 2024-01-22 PROCEDURE — G8399 PT W/DXA RESULTS DOCUMENT: HCPCS | Performed by: INTERNAL MEDICINE

## 2024-01-22 PROCEDURE — G8427 DOCREV CUR MEDS BY ELIG CLIN: HCPCS | Performed by: INTERNAL MEDICINE

## 2024-01-22 PROCEDURE — 1036F TOBACCO NON-USER: CPT | Performed by: INTERNAL MEDICINE

## 2024-01-22 NOTE — PROGRESS NOTES
Ov Dr Sepulveda 1 year follow up   Grand daughter here Carlene.  Age 17 going to school   For nursing.   No chest pain   Some sob at times  Some dizziness   No edema  Still wearing mask   So some sob walking.  Son owns a store   In town.  Had colonoscopy done in June 2023.    Will see in 1 year.               
medications as ordered.   Her risk factors are nicely modified   She has no chest pain or chest discomfort to indicate need to do further ischemia workup.      We told  Ms. Oconnor to call our office if she has any problems, but otherwise we asked her to follow up in 1 year and we will repeat labs, chest x-ray and EKG. However, we would be happy to see her sooner should the need arise.      Thank you very much for allowing us the privilege of seeing Ms. Oconnor.  If you have any questions on our  thoughts, please do not hesitate to contact us.     Sincerely,  MIKIE Chandler - CNP  [facility]  [address] [place] [address]  Phone: [phone], Fax: [phone]     Jaime Sepulveda MD

## 2024-01-23 RX ORDER — ALBUTEROL SULFATE 2.5 MG/3ML
2.5 SOLUTION RESPIRATORY (INHALATION) EVERY 6 HOURS PRN
Qty: 120 EACH | Refills: 1 | Status: SHIPPED | OUTPATIENT
Start: 2024-01-23

## 2024-02-06 DIAGNOSIS — Z12.31 BREAST CANCER SCREENING BY MAMMOGRAM: Primary | ICD-10-CM

## 2024-02-20 ENCOUNTER — TELEPHONE (OUTPATIENT)
Dept: FAMILY MEDICINE CLINIC | Age: 69
End: 2024-02-20

## 2024-02-20 DIAGNOSIS — Z12.31 BREAST CANCER SCREENING BY MAMMOGRAM: Primary | ICD-10-CM

## 2024-02-20 NOTE — TELEPHONE ENCOUNTER
Received call from the hospital requesting for PCP to place order for mammogram for patient appointment tomorrow. Please advise. Thank you.

## 2024-02-21 ENCOUNTER — HOSPITAL ENCOUNTER (OUTPATIENT)
Dept: MAMMOGRAPHY | Age: 69
Discharge: HOME | End: 2024-02-23
Attending: INTERNAL MEDICINE
Payer: MEDICARE

## 2024-02-21 DIAGNOSIS — Z12.31 BREAST CANCER SCREENING BY MAMMOGRAM: ICD-10-CM

## 2024-02-21 PROCEDURE — 77063 BREAST TOMOSYNTHESIS BI: CPT

## 2024-02-28 ENCOUNTER — TELEPHONE (OUTPATIENT)
Dept: FAMILY MEDICINE CLINIC | Age: 69
End: 2024-02-28

## 2024-02-28 NOTE — TELEPHONE ENCOUNTER
----- Message from Duane Ratliff MD sent at 2/27/2024  8:27 PM EST -----  Mammogram is negative  Thanks    ----- Message -----  From: Nell Will Incoming Radiant Results From [facility]  Sent: 2/27/2024   5:20 PM EST  To: Duane Ratliff MD

## 2024-02-28 NOTE — TELEPHONE ENCOUNTER
LVM for patient in regards to recent MAMM results. Advised to contact the office with further questions or concerns.

## 2024-02-28 NOTE — TELEPHONE ENCOUNTER
----- Message from Duane Ratliff MD sent at 2/27/2024  8:27 PM EST -----  Mammogram is negative  Thanks    ----- Message -----  From: Nell Will Incoming Radiant Results From [facility]  Sent: 2/27/2024   5:20 PM EST  To: Duane Ratliff MD

## 2024-03-23 DIAGNOSIS — I48.91 ATRIAL FIBRILLATION, UNSPECIFIED TYPE (HCC): ICD-10-CM

## 2024-03-25 ENCOUNTER — OFFICE VISIT (OUTPATIENT)
Dept: FAMILY MEDICINE CLINIC | Age: 69
End: 2024-03-25
Payer: MEDICARE

## 2024-03-25 VITALS
RESPIRATION RATE: 18 BRPM | SYSTOLIC BLOOD PRESSURE: 122 MMHG | WEIGHT: 192.4 LBS | OXYGEN SATURATION: 97 % | BODY MASS INDEX: 30.92 KG/M2 | HEART RATE: 85 BPM | DIASTOLIC BLOOD PRESSURE: 76 MMHG | HEIGHT: 66 IN

## 2024-03-25 DIAGNOSIS — F32.1 CURRENT MODERATE EPISODE OF MAJOR DEPRESSIVE DISORDER, UNSPECIFIED WHETHER RECURRENT (HCC): ICD-10-CM

## 2024-03-25 DIAGNOSIS — R20.0 NUMBNESS OF FACE: Primary | ICD-10-CM

## 2024-03-25 DIAGNOSIS — N90.4 VULVAR DYSTROPHY: ICD-10-CM

## 2024-03-25 PROCEDURE — 99214 OFFICE O/P EST MOD 30 MIN: CPT | Performed by: INTERNAL MEDICINE

## 2024-03-25 PROCEDURE — G8427 DOCREV CUR MEDS BY ELIG CLIN: HCPCS | Performed by: INTERNAL MEDICINE

## 2024-03-25 PROCEDURE — 3078F DIAST BP <80 MM HG: CPT | Performed by: INTERNAL MEDICINE

## 2024-03-25 PROCEDURE — 1123F ACP DISCUSS/DSCN MKR DOCD: CPT | Performed by: INTERNAL MEDICINE

## 2024-03-25 PROCEDURE — 1090F PRES/ABSN URINE INCON ASSESS: CPT | Performed by: INTERNAL MEDICINE

## 2024-03-25 PROCEDURE — 3017F COLORECTAL CA SCREEN DOC REV: CPT | Performed by: INTERNAL MEDICINE

## 2024-03-25 PROCEDURE — G8484 FLU IMMUNIZE NO ADMIN: HCPCS | Performed by: INTERNAL MEDICINE

## 2024-03-25 PROCEDURE — 1036F TOBACCO NON-USER: CPT | Performed by: INTERNAL MEDICINE

## 2024-03-25 PROCEDURE — 3074F SYST BP LT 130 MM HG: CPT | Performed by: INTERNAL MEDICINE

## 2024-03-25 PROCEDURE — G8417 CALC BMI ABV UP PARAM F/U: HCPCS | Performed by: INTERNAL MEDICINE

## 2024-03-25 PROCEDURE — G8399 PT W/DXA RESULTS DOCUMENT: HCPCS | Performed by: INTERNAL MEDICINE

## 2024-03-25 RX ORDER — LIDOCAINE 50 MG/G
OINTMENT TOPICAL
Qty: 1 EACH | Refills: 3 | Status: SHIPPED | OUTPATIENT
Start: 2024-03-25

## 2024-03-25 RX ORDER — CITALOPRAM HYDROBROMIDE 10 MG/1
10 TABLET ORAL DAILY
COMMUNITY
Start: 2024-03-16

## 2024-03-25 ASSESSMENT — ENCOUNTER SYMPTOMS
SHORTNESS OF BREATH: 0
SORE THROAT: 0
NAUSEA: 0
COUGH: 0
ABDOMINAL PAIN: 0

## 2024-03-25 ASSESSMENT — PATIENT HEALTH QUESTIONNAIRE - PHQ9
7. TROUBLE CONCENTRATING ON THINGS, SUCH AS READING THE NEWSPAPER OR WATCHING TELEVISION: NOT AT ALL
SUM OF ALL RESPONSES TO PHQ QUESTIONS 1-9: 0
SUM OF ALL RESPONSES TO PHQ9 QUESTIONS 1 & 2: 0
10. IF YOU CHECKED OFF ANY PROBLEMS, HOW DIFFICULT HAVE THESE PROBLEMS MADE IT FOR YOU TO DO YOUR WORK, TAKE CARE OF THINGS AT HOME, OR GET ALONG WITH OTHER PEOPLE: NOT DIFFICULT AT ALL
SUM OF ALL RESPONSES TO PHQ QUESTIONS 1-9: 0
3. TROUBLE FALLING OR STAYING ASLEEP: NOT AT ALL
8. MOVING OR SPEAKING SO SLOWLY THAT OTHER PEOPLE COULD HAVE NOTICED. OR THE OPPOSITE, BEING SO FIGETY OR RESTLESS THAT YOU HAVE BEEN MOVING AROUND A LOT MORE THAN USUAL: NOT AT ALL
SUM OF ALL RESPONSES TO PHQ QUESTIONS 1-9: 0
2. FEELING DOWN, DEPRESSED OR HOPELESS: NOT AT ALL
6. FEELING BAD ABOUT YOURSELF - OR THAT YOU ARE A FAILURE OR HAVE LET YOURSELF OR YOUR FAMILY DOWN: NOT AT ALL
4. FEELING TIRED OR HAVING LITTLE ENERGY: NOT AT ALL
SUM OF ALL RESPONSES TO PHQ QUESTIONS 1-9: 0
1. LITTLE INTEREST OR PLEASURE IN DOING THINGS: NOT AT ALL
9. THOUGHTS THAT YOU WOULD BE BETTER OFF DEAD, OR OF HURTING YOURSELF: NOT AT ALL
5. POOR APPETITE OR OVEREATING: NOT AT ALL

## 2024-03-25 NOTE — PROGRESS NOTES
HPI Notes    Name: Courtney Oconnor  : 1955         Chief Complaint:     Chief Complaint   Patient presents with    Facial Pain     Patients states that she is having pain on the right side of her face and would like a referral to a neurologists per her plastic surgeon.        History of Present Illness:        HPI      Courtney presents to office for evaluation of swelling and numbness over the right side of her face. She is accompanied with her son Calixto mike with [employer].   States Developed swelling and numbness over the right face /cheek many years ago. The problem is getting worse. She feels it's spreading to the forehead and temple area.   Says the top of her upper gums is painful. Wearing dentures. Has no trouble swallowing   Says has been having clear discharge from the right eye. Eye is itching.   Was seen by a dermatologist in [place] and referred to a  plastic surgeon in [place] and then was told to see a neurologist for possible \"nerve damage.\"       Past Medical History:   Diagnosis Date    Arthritis     Asthma     Chickenpox     Hypertension     Hypothyroidism     Measles     Mumps     Osteoarthritis     Smallpox     Ulcer     Ulcer     Whooping cough       Reviewed all health maintenance requirements and orderedappropriate tests  Health Maintenance Due   Topic Date Due    DTaP/Tdap/Td vaccine (1 - Tdap) Never done    Shingles vaccine (1 of 2) Never done    Respiratory Syncytial Virus (RSV) Pregnant or age 60 yrs+ (1 - 1-dose 60+ series) Never done    Flu vaccine (1) 2023    COVID-19 Vaccine (3 - -24 season) 2023    Annual Wellness Visit (Medicare Advantage)  2024       Past Surgical History:     Past Surgical History:   Procedure Laterality Date    CHOLECYSTECTOMY, LAPAROSCOPIC N/A 3/4/2019    CHOLECYSTECTOMY LAPAROSCOPIC performed by Dennis Atkinson MD at [facility] OR    COLONOSCOPY  2015    Demetrio MICHEL    COLONOSCOPY N/A 6/15/2023    COLONOSCOPY

## 2024-04-16 DIAGNOSIS — K52.9 CHRONIC DIARRHEA: ICD-10-CM

## 2024-04-17 RX ORDER — L. ACIDOPHILUS/L.BULGARICUS 1MM CELL
1 TABLET ORAL 2 TIMES DAILY
Qty: 60 TABLET | Refills: 5 | Status: SHIPPED | OUTPATIENT
Start: 2024-04-17

## 2024-05-28 RX ORDER — CITALOPRAM HYDROBROMIDE 10 MG/1
10 TABLET ORAL DAILY
Qty: 30 TABLET | Refills: 5 | Status: SHIPPED | OUTPATIENT
Start: 2024-05-28

## 2024-06-16 DIAGNOSIS — I48.0 PAROXYSMAL ATRIAL FIBRILLATION (HCC): ICD-10-CM

## 2024-06-21 ENCOUNTER — HOSPITAL ENCOUNTER (OUTPATIENT)
Dept: MRI IMAGING | Age: 69
End: 2024-06-21
Payer: MEDICARE

## 2024-06-21 DIAGNOSIS — R20.0 RIGHT FACIAL NUMBNESS: ICD-10-CM

## 2024-06-21 DIAGNOSIS — R51.9 RIGHT FACIAL PAIN: ICD-10-CM

## 2024-06-21 LAB
ANION GAP SERPL CALCULATED.3IONS-SCNC: 5 MMOL/L (ref 9–17)
BUN SERPL-MCNC: 13 MG/DL (ref 8–23)
BUN/CREAT SERPL: 16 (ref 9–20)
CALCIUM SERPL-MCNC: 9.5 MG/DL (ref 8.6–10.4)
CHLORIDE SERPL-SCNC: 103 MMOL/L (ref 98–107)
CO2 SERPL-SCNC: 33 MMOL/L (ref 20–31)
CREAT SERPL-MCNC: 0.8 MG/DL (ref 0.5–0.9)
GFR, ESTIMATED: 79 ML/MIN/1.73M2
GLUCOSE SERPL-MCNC: 97 MG/DL (ref 70–99)
POTASSIUM SERPL-SCNC: 4.7 MMOL/L (ref 3.7–5.3)
SODIUM SERPL-SCNC: 141 MMOL/L (ref 135–144)

## 2024-06-21 PROCEDURE — A9577 INJ MULTIHANCE: HCPCS | Performed by: PSYCHIATRY & NEUROLOGY

## 2024-06-21 PROCEDURE — 80048 BASIC METABOLIC PNL TOTAL CA: CPT

## 2024-06-21 PROCEDURE — 36415 COLL VENOUS BLD VENIPUNCTURE: CPT

## 2024-06-21 PROCEDURE — 6360000004 HC RX CONTRAST MEDICATION: Performed by: PSYCHIATRY & NEUROLOGY

## 2024-06-21 PROCEDURE — 70553 MRI BRAIN STEM W/O & W/DYE: CPT

## 2024-06-21 RX ADMIN — GADOBENATE DIMEGLUMINE 18 ML: 529 INJECTION, SOLUTION INTRAVENOUS at 11:13

## 2024-06-26 DIAGNOSIS — N90.4 VULVAR DYSTROPHY: ICD-10-CM

## 2024-06-26 RX ORDER — LIDOCAINE 50 MG/G
OINTMENT TOPICAL
Qty: 1 EACH | Refills: 3 | Status: SHIPPED | OUTPATIENT
Start: 2024-06-26

## 2024-07-01 ENCOUNTER — OFFICE VISIT (OUTPATIENT)
Dept: FAMILY MEDICINE CLINIC | Age: 69
End: 2024-07-01
Payer: MEDICARE

## 2024-07-01 VITALS
OXYGEN SATURATION: 99 % | BODY MASS INDEX: 31.72 KG/M2 | HEART RATE: 66 BPM | HEIGHT: 66 IN | WEIGHT: 197.4 LBS | RESPIRATION RATE: 18 BRPM | SYSTOLIC BLOOD PRESSURE: 118 MMHG | DIASTOLIC BLOOD PRESSURE: 62 MMHG

## 2024-07-01 DIAGNOSIS — R07.9 CHEST PAIN, UNSPECIFIED TYPE: ICD-10-CM

## 2024-07-01 DIAGNOSIS — R07.89 XYPHOIDALGIA: Primary | ICD-10-CM

## 2024-07-01 PROCEDURE — 3078F DIAST BP <80 MM HG: CPT | Performed by: INTERNAL MEDICINE

## 2024-07-01 PROCEDURE — 99214 OFFICE O/P EST MOD 30 MIN: CPT | Performed by: INTERNAL MEDICINE

## 2024-07-01 PROCEDURE — 3074F SYST BP LT 130 MM HG: CPT | Performed by: INTERNAL MEDICINE

## 2024-07-01 PROCEDURE — 1123F ACP DISCUSS/DSCN MKR DOCD: CPT | Performed by: INTERNAL MEDICINE

## 2024-07-01 PROCEDURE — 1036F TOBACCO NON-USER: CPT | Performed by: INTERNAL MEDICINE

## 2024-07-01 PROCEDURE — 3017F COLORECTAL CA SCREEN DOC REV: CPT | Performed by: INTERNAL MEDICINE

## 2024-07-01 PROCEDURE — G8427 DOCREV CUR MEDS BY ELIG CLIN: HCPCS | Performed by: INTERNAL MEDICINE

## 2024-07-01 PROCEDURE — G8399 PT W/DXA RESULTS DOCUMENT: HCPCS | Performed by: INTERNAL MEDICINE

## 2024-07-01 PROCEDURE — G8417 CALC BMI ABV UP PARAM F/U: HCPCS | Performed by: INTERNAL MEDICINE

## 2024-07-01 PROCEDURE — 1090F PRES/ABSN URINE INCON ASSESS: CPT | Performed by: INTERNAL MEDICINE

## 2024-07-01 RX ORDER — PREGABALIN 25 MG/1
25 CAPSULE ORAL 2 TIMES DAILY
COMMUNITY
Start: 2024-06-03 | End: 2024-09-01

## 2024-07-01 RX ORDER — PREDNISONE 20 MG/1
20 TABLET ORAL DAILY
Qty: 7 TABLET | Refills: 0 | Status: SHIPPED | OUTPATIENT
Start: 2024-07-01 | End: 2024-07-08

## 2024-07-01 RX ORDER — LANOLIN ALCOHOL/MO/W.PET/CERES
100 CREAM (GRAM) TOPICAL DAILY
COMMUNITY
Start: 2024-06-03 | End: 2024-09-01

## 2024-07-01 ASSESSMENT — ENCOUNTER SYMPTOMS
ABDOMINAL PAIN: 0
CONSTIPATION: 0
COUGH: 0
VOMITING: 0
CHEST TIGHTNESS: 0
BACK PAIN: 0
SORE THROAT: 0
WHEEZING: 0
ABDOMINAL DISTENTION: 0
SHORTNESS OF BREATH: 0
NAUSEA: 0

## 2024-07-01 NOTE — PATIENT INSTRUCTIONS
SURVEY:    You may be receiving a survey from [facility] regarding your visit today.    Please complete the survey to enable us to provide the highest quality of care to you and your family.    If you cannot score us a very good on any question, please call the office to discuss how we could have made your experience a very good one.    Thank you.  [facility]  MD Angelina Way, MD Manuel Bond, DO  Monty Avila, MD Florecita Morales, APRN-CNP  Yajaira Hannah, Practice Manager  Radha, CMA  Vandana, CCMA  Adonay, CMA  Joana, CMA  Aaron, PSC   Lakesha, LPN

## 2024-07-01 NOTE — PROGRESS NOTES
HPI Notes    Name: Courtney Oconnor  : 1955         Chief Complaint:     Chief Complaint   Patient presents with    Rash     Patient has a rash under bra line and states she feels the itching internal.       History of Present Illness:        Courtney presents to office for evaluation of sharp pains between her breasts.    She is accompanied by her  Elsie who helps with  Greek interpretation  Courtney states that she is having pain in the middle of her lower chest, pointing to the xiphoid area.    The pain started few weeks ago and has been intermittent.  Sometimes she \"jumps\" due to sharp pains.  Certain body movements triggered the pain.    She states that she feels the pain is \" itchy\" and sharp. Bending triggers it and resting helps to alleviate it.  She has no associated shortness of breath, cough, pain in the upper chest, no abdominal pain, no nausea or vomiting.  Her appetite has been good, bowels moving regularly.  She applied lidocaine cream to the affected area and it helped to alleviate her pain  Has no rash .   Says falling a lot because of \" clumsiness\".  She follows up with neurology.              Past Medical History:     Past Medical History:   Diagnosis Date    Arthritis     Asthma     Chickenpox     Hypertension     Hypothyroidism     Measles     Mumps     Osteoarthritis     Smallpox     Ulcer     Ulcer     Whooping cough       Reviewed all health maintenance requirements and orderedappropriate tests  Health Maintenance Due   Topic Date Due    DTaP/Tdap/Td vaccine (1 - Tdap) Never done    Shingles vaccine (1 of 2) Never done    Respiratory Syncytial Virus (RSV) Pregnant or age 60 yrs+ (1 - 1-dose 60+ series) Never done    COVID-19 Vaccine (3 - 2023- season) 2023    Annual Wellness Visit (Medicare Advantage)  2024       Past Surgical History:     Past Surgical History:   Procedure Laterality Date    CHOLECYSTECTOMY, LAPAROSCOPIC N/A 3/4/2019    CHOLECYSTECTOMY LAPAROSCOPIC  Hal Becerril presents today for   Chief Complaint   Patient presents with    Weight Management    Depression       Is someone accompanying this pt? na    Is the patient using any DME equipment during OV? na    Depression Screening:  3 most recent PHQ Screens 10/26/2021   Little interest or pleasure in doing things Not at all   Feeling down, depressed, irritable, or hopeless Not at all   Total Score PHQ 2 0       Learning Assessment:  Learning Assessment 1/3/2017   PRIMARY LEARNER Patient   HIGHEST LEVEL OF EDUCATION - PRIMARY LEARNER  [facility] NONE   CO-LEARNER CAREGIVER No   PRIMARY LANGUAGE ENGLISH   LEARNER PREFERENCE PRIMARY READING   ANSWERED BY Self   RELATIONSHIP SELF       Abuse Screening:  Abuse Screening Questionnaire 3/17/2016   Do you ever feel afraid of your partner? N   Are you in a relationship with someone who physically or mentally threatens you? N   Is it safe for you to go home? Y       Fall Risk  No flowsheet data found. Health Maintenance reviewed and discussed and ordered per Provider. Health Maintenance Due   Topic Date Due    COVID-19 Vaccine (1) Never done    Flu Vaccine (1) Never done    Pap Smear  01/14/2022   . Coordination of Care:  1. Have you been to the ER, urgent care clinic since your last visit? Hospitalized since your last visit? no    2. Have you seen or consulted any other health care providers outside of the [address] [place] [address] since your last visit? Include any pap smears or colon screening.  no      Last  Checked na  Last UDS Checked na  Last Pain contract signed: na    Patients concerns today:  Med refills

## 2024-07-25 ENCOUNTER — OFFICE VISIT (OUTPATIENT)
Dept: FAMILY MEDICINE CLINIC | Age: 69
End: 2024-07-25
Payer: MEDICARE

## 2024-07-25 ENCOUNTER — TELEPHONE (OUTPATIENT)
Dept: FAMILY MEDICINE CLINIC | Age: 69
End: 2024-07-25

## 2024-07-25 VITALS
DIASTOLIC BLOOD PRESSURE: 78 MMHG | OXYGEN SATURATION: 95 % | HEART RATE: 89 BPM | HEIGHT: 66 IN | WEIGHT: 200.6 LBS | SYSTOLIC BLOOD PRESSURE: 118 MMHG | RESPIRATION RATE: 18 BRPM | BODY MASS INDEX: 32.24 KG/M2

## 2024-07-25 DIAGNOSIS — J02.9 SORE THROAT: ICD-10-CM

## 2024-07-25 DIAGNOSIS — E03.9 ACQUIRED HYPOTHYROIDISM: ICD-10-CM

## 2024-07-25 DIAGNOSIS — B37.0 ORAL THRUSH: Primary | ICD-10-CM

## 2024-07-25 LAB — S PYO AG THROAT QL: NORMAL

## 2024-07-25 PROCEDURE — G8417 CALC BMI ABV UP PARAM F/U: HCPCS | Performed by: INTERNAL MEDICINE

## 2024-07-25 PROCEDURE — 1036F TOBACCO NON-USER: CPT | Performed by: INTERNAL MEDICINE

## 2024-07-25 PROCEDURE — 3078F DIAST BP <80 MM HG: CPT | Performed by: INTERNAL MEDICINE

## 2024-07-25 PROCEDURE — G8399 PT W/DXA RESULTS DOCUMENT: HCPCS | Performed by: INTERNAL MEDICINE

## 2024-07-25 PROCEDURE — 3017F COLORECTAL CA SCREEN DOC REV: CPT | Performed by: INTERNAL MEDICINE

## 2024-07-25 PROCEDURE — 99214 OFFICE O/P EST MOD 30 MIN: CPT | Performed by: INTERNAL MEDICINE

## 2024-07-25 PROCEDURE — 3074F SYST BP LT 130 MM HG: CPT | Performed by: INTERNAL MEDICINE

## 2024-07-25 PROCEDURE — 1090F PRES/ABSN URINE INCON ASSESS: CPT | Performed by: INTERNAL MEDICINE

## 2024-07-25 PROCEDURE — G8427 DOCREV CUR MEDS BY ELIG CLIN: HCPCS | Performed by: INTERNAL MEDICINE

## 2024-07-25 PROCEDURE — 1123F ACP DISCUSS/DSCN MKR DOCD: CPT | Performed by: INTERNAL MEDICINE

## 2024-07-25 PROCEDURE — 87880 STREP A ASSAY W/OPTIC: CPT | Performed by: INTERNAL MEDICINE

## 2024-07-25 RX ORDER — L. ACIDOPHILUS/L.BULGARICUS 100MM CELL
1 GRANULES IN PACKET (EA) ORAL 2 TIMES DAILY
Qty: 60 EACH | Refills: 5 | Status: SHIPPED | OUTPATIENT
Start: 2024-07-25

## 2024-07-25 RX ORDER — FLUCONAZOLE 100 MG/1
100 TABLET ORAL 2 TIMES DAILY
Qty: 10 TABLET | Refills: 0 | Status: SHIPPED | OUTPATIENT
Start: 2024-07-25 | End: 2024-07-30

## 2024-07-25 RX ORDER — LEVOTHYROXINE SODIUM 0.05 MG/1
50 TABLET ORAL DAILY
Qty: 90 TABLET | Refills: 1 | Status: SHIPPED | OUTPATIENT
Start: 2024-07-25

## 2024-07-25 ASSESSMENT — ENCOUNTER SYMPTOMS
TROUBLE SWALLOWING: 0
CHANGE IN BOWEL HABIT: 0
SINUS PRESSURE: 0
VOICE CHANGE: 0
SORE THROAT: 1
RHINORRHEA: 0
NAUSEA: 0
VOMITING: 0
SINUS PAIN: 0
COUGH: 0

## 2024-07-25 NOTE — PROGRESS NOTES
HPI Notes    Name: Courtney Oconnor  : 1955         Chief Complaint:     Chief Complaint   Patient presents with    Pharyngitis     Patient states she has had a sore throat for 2 days with a scaly tongue, patient had a tonsillectomy but still experiencing symptoms.       History of Present Illness:        Courtney presents to office for evaluation of sore throat and to follow up for Hypothyroidism      Patient requested that her Her daughter Elsie help with [place]  interpretation and declined iPAD services.     She developed sore throat and dry mouth after recent treatment with Prednisone   She feels her tongue is rough \" as if I ate a lot of pineapple\". She noticed some whitish spots on the sides of her tongue. Says her tonsills removed but she feels like theres is some sort of swelling in her throat     Courtney confirms that she is compliant with her  thyroid medication.  She denies an increase in fatigue, constipation, increased skin dryness, or increased lower extremity edema.  The last TSH was 2.71 on 24        Pharyngitis  This is a new problem. The current episode started 1 to 4 weeks ago. The problem occurs constantly. The problem has been gradually worsening. Associated symptoms include a sore throat. Pertinent negatives include no anorexia, change in bowel habit, chills, congestion, coughing, fatigue, nausea, rash or vomiting. The symptoms are aggravated by swallowing. She has tried nothing for the symptoms. The treatment provided no relief.                 Past Medical History:     Past Medical History:   Diagnosis Date    Arthritis     Asthma     Chickenpox     Hypertension     Hypothyroidism     Measles     Mumps     Osteoarthritis     Smallpox     Ulcer     Ulcer     Whooping cough       Reviewed all health maintenance requirements and orderedappropriate tests  Health Maintenance Due   Topic Date Due    DTaP/Tdap/Td vaccine (1 - Tdap) Never done    Shingles vaccine (1 of 2) Never done

## 2024-07-25 NOTE — TELEPHONE ENCOUNTER
DM called; lactobacillus no longer comes in packets and the pill is not covered by her insurance. Can the pharmacist suggest an over the counter probiotic to the patient /

## 2024-09-03 DIAGNOSIS — K52.9 CHRONIC DIARRHEA: ICD-10-CM

## 2024-09-03 DIAGNOSIS — E03.9 ACQUIRED HYPOTHYROIDISM: ICD-10-CM

## 2024-09-03 DIAGNOSIS — I48.91 ATRIAL FIBRILLATION, UNSPECIFIED TYPE (HCC): ICD-10-CM

## 2024-09-03 DIAGNOSIS — I48.0 PAROXYSMAL ATRIAL FIBRILLATION (HCC): ICD-10-CM

## 2024-09-03 RX ORDER — L. ACIDOPHILUS/L.BULGARICUS 1MM CELL
1 TABLET ORAL 2 TIMES DAILY
Qty: 60 TABLET | Refills: 5 | Status: SHIPPED | OUTPATIENT
Start: 2024-09-03

## 2024-09-03 RX ORDER — LANOLIN ALCOHOL/MO/W.PET/CERES
100 CREAM (GRAM) TOPICAL DAILY
Qty: 30 TABLET | Refills: 2 | Status: SHIPPED | OUTPATIENT
Start: 2024-09-03 | End: 2024-12-02

## 2024-09-03 RX ORDER — DILTIAZEM HYDROCHLORIDE 30 MG/1
TABLET, FILM COATED ORAL
Qty: 180 TABLET | Refills: 1 | Status: SHIPPED | OUTPATIENT
Start: 2024-09-03

## 2024-09-03 RX ORDER — PREGABALIN 25 MG/1
25 CAPSULE ORAL 2 TIMES DAILY
Qty: 60 CAPSULE | Refills: 2 | OUTPATIENT
Start: 2024-09-03 | End: 2024-12-02

## 2024-09-03 RX ORDER — ALBUTEROL SULFATE 0.83 MG/ML
2.5 SOLUTION RESPIRATORY (INHALATION) EVERY 6 HOURS PRN
Qty: 120 EACH | Refills: 1 | Status: SHIPPED | OUTPATIENT
Start: 2024-09-03

## 2024-09-03 RX ORDER — LEVOTHYROXINE SODIUM 50 UG/1
50 TABLET ORAL DAILY
Qty: 90 TABLET | Refills: 1 | Status: SHIPPED | OUTPATIENT
Start: 2024-09-03

## 2024-09-03 RX ORDER — CHOLECALCIFEROL (VITAMIN D3) 25 MCG
1 TABLET ORAL DAILY
Qty: 30 TABLET | Refills: 5 | Status: SHIPPED | OUTPATIENT
Start: 2024-09-03

## 2024-09-03 RX ORDER — L. ACIDOPHILUS/L.BULGARICUS 100MM CELL
1 GRANULES IN PACKET (EA) ORAL 2 TIMES DAILY
Qty: 60 EACH | Refills: 5 | Status: SHIPPED | OUTPATIENT
Start: 2024-09-03

## 2024-09-14 DIAGNOSIS — I48.91 ATRIAL FIBRILLATION, UNSPECIFIED TYPE (HCC): ICD-10-CM

## 2024-11-21 RX ORDER — LANOLIN ALCOHOL/MO/W.PET/CERES
100 CREAM (GRAM) TOPICAL DAILY
Qty: 30 TABLET | Refills: 2 | Status: SHIPPED | OUTPATIENT
Start: 2024-11-21 | End: 2025-02-19

## 2024-11-25 RX ORDER — CHOLECALCIFEROL (VITAMIN D3) 25 MCG
1 TABLET ORAL DAILY
Qty: 30 TABLET | Refills: 5 | Status: SHIPPED | OUTPATIENT
Start: 2024-11-25

## 2024-12-02 RX ORDER — FAMOTIDINE 20 MG/1
20 TABLET, FILM COATED ORAL 2 TIMES DAILY
Qty: 60 TABLET | Refills: 3 | Status: SHIPPED | OUTPATIENT
Start: 2024-12-02

## 2024-12-02 NOTE — TELEPHONE ENCOUNTER
[place]   12/19/2024 12:00 PM Duane Ratliff MD [facility] DEP   1/20/2025 10:00 AM Jaime Sepulveda MD [facility]            Patient Active Problem List:     Hypothyroidism     Hx of gastroesophageal reflux (GERD)     Hypertension     Paroxysmal atrial fibrillation (HCC)     Dysthymia     Gastroesophageal reflux disease     Asthma     Current moderate episode of major depressive disorder, unspecified whether recurrent (HCC)

## 2024-12-06 ENCOUNTER — OFFICE VISIT (OUTPATIENT)
Dept: FAMILY MEDICINE CLINIC | Age: 69
End: 2024-12-06
Payer: MEDICARE

## 2024-12-06 VITALS
HEIGHT: 66 IN | RESPIRATION RATE: 18 BRPM | OXYGEN SATURATION: 93 % | SYSTOLIC BLOOD PRESSURE: 128 MMHG | HEART RATE: 61 BPM | DIASTOLIC BLOOD PRESSURE: 70 MMHG | BODY MASS INDEX: 32.14 KG/M2 | WEIGHT: 200 LBS

## 2024-12-06 DIAGNOSIS — R07.89 XYPHOIDALGIA: ICD-10-CM

## 2024-12-06 DIAGNOSIS — N89.8 VAGINAL ITCHING: ICD-10-CM

## 2024-12-06 DIAGNOSIS — S70.02XA CONTUSION OF LEFT HIP, INITIAL ENCOUNTER: Primary | ICD-10-CM

## 2024-12-06 DIAGNOSIS — S70.12XA HEMATOMA OF LEFT THIGH, INITIAL ENCOUNTER: ICD-10-CM

## 2024-12-06 PROCEDURE — 1036F TOBACCO NON-USER: CPT | Performed by: INTERNAL MEDICINE

## 2024-12-06 PROCEDURE — 1090F PRES/ABSN URINE INCON ASSESS: CPT | Performed by: INTERNAL MEDICINE

## 2024-12-06 PROCEDURE — 3078F DIAST BP <80 MM HG: CPT | Performed by: INTERNAL MEDICINE

## 2024-12-06 PROCEDURE — 3017F COLORECTAL CA SCREEN DOC REV: CPT | Performed by: INTERNAL MEDICINE

## 2024-12-06 PROCEDURE — 1159F MED LIST DOCD IN RCRD: CPT | Performed by: INTERNAL MEDICINE

## 2024-12-06 PROCEDURE — G8427 DOCREV CUR MEDS BY ELIG CLIN: HCPCS | Performed by: INTERNAL MEDICINE

## 2024-12-06 PROCEDURE — G8399 PT W/DXA RESULTS DOCUMENT: HCPCS | Performed by: INTERNAL MEDICINE

## 2024-12-06 PROCEDURE — 99214 OFFICE O/P EST MOD 30 MIN: CPT | Performed by: INTERNAL MEDICINE

## 2024-12-06 PROCEDURE — 1123F ACP DISCUSS/DSCN MKR DOCD: CPT | Performed by: INTERNAL MEDICINE

## 2024-12-06 PROCEDURE — G8484 FLU IMMUNIZE NO ADMIN: HCPCS | Performed by: INTERNAL MEDICINE

## 2024-12-06 PROCEDURE — 3074F SYST BP LT 130 MM HG: CPT | Performed by: INTERNAL MEDICINE

## 2024-12-06 PROCEDURE — G8417 CALC BMI ABV UP PARAM F/U: HCPCS | Performed by: INTERNAL MEDICINE

## 2024-12-06 RX ORDER — PREGABALIN 50 MG/1
50 CAPSULE ORAL 3 TIMES DAILY
COMMUNITY
Start: 2024-10-11 | End: 2024-12-06 | Stop reason: SDUPTHER

## 2024-12-06 RX ORDER — FLUCONAZOLE 150 MG/1
150 TABLET ORAL ONCE
Qty: 1 TABLET | Refills: 0 | Status: SHIPPED | OUTPATIENT
Start: 2024-12-06 | End: 2024-12-06

## 2024-12-06 RX ORDER — UBIDECARENONE 75 MG
50 CAPSULE ORAL DAILY
COMMUNITY

## 2024-12-06 RX ORDER — TRAMADOL HYDROCHLORIDE 50 MG/1
50 TABLET ORAL 2 TIMES DAILY PRN
Qty: 20 TABLET | Refills: 0 | Status: SHIPPED | OUTPATIENT
Start: 2024-12-06 | End: 2024-12-16

## 2024-12-06 RX ORDER — PREGABALIN 50 MG/1
50 CAPSULE ORAL 3 TIMES DAILY
Qty: 90 CAPSULE | Refills: 0 | Status: SHIPPED | OUTPATIENT
Start: 2024-12-06 | End: 2025-01-05

## 2024-12-06 ASSESSMENT — ENCOUNTER SYMPTOMS
ABDOMINAL PAIN: 0
SORE THROAT: 0
NAUSEA: 0
SHORTNESS OF BREATH: 0
COUGH: 0

## 2024-12-06 NOTE — PROGRESS NOTES
Take lowest dose possible to manage pain Max Daily Amount: 100 mg    pregabalin (LYRICA) 50 MG capsule 90 capsule 0     Sig: Take 1 capsule by mouth 3 times daily for 30 days. Max Daily Amount: 150 mg    fluconazole (DIFLUCAN) 150 MG tablet 1 tablet 0     Sig: Take 1 tablet by mouth once for 1 dose     No follow-ups on file.     Orders Placed This Encounter   Medications    traMADol (ULTRAM) 50 MG tablet     Sig: Take 1 tablet by mouth 2 times daily as needed for Pain for up to 10 days. Intended supply: 5 days. Take lowest dose possible to manage pain Max Daily Amount: 100 mg     Dispense:  20 tablet     Refill:  0     Reduce doses taken as pain becomes manageable    pregabalin (LYRICA) 50 MG capsule     Sig: Take 1 capsule by mouth 3 times daily for 30 days. Max Daily Amount: 150 mg     Dispense:  90 capsule     Refill:  0    fluconazole (DIFLUCAN) 150 MG tablet     Sig: Take 1 tablet by mouth once for 1 dose     Dispense:  1 tablet     Refill:  0     No orders of the defined types were placed in this encounter.        There are no Patient Instructions on file for this visit.    Electronically signed by Duane Ratliff MD on 12/6/2024 at 12:29 PM           Completed Refills      Requested Prescriptions     Signed Prescriptions Disp Refills    traMADol (ULTRAM) 50 MG tablet 20 tablet 0     Sig: Take 1 tablet by mouth 2 times daily as needed for Pain for up to 10 days. Intended supply: 5 days. Take lowest dose possible to manage pain Max Daily Amount: 100 mg    pregabalin (LYRICA) 50 MG capsule 90 capsule 0     Sig: Take 1 capsule by mouth 3 times daily for 30 days. Max Daily Amount: 150 mg    fluconazole (DIFLUCAN) 150 MG tablet 1 tablet 0     Sig: Take 1 tablet by mouth once for 1 dose

## 2024-12-19 ENCOUNTER — OFFICE VISIT (OUTPATIENT)
Dept: FAMILY MEDICINE CLINIC | Age: 69
End: 2024-12-19

## 2024-12-19 VITALS
HEIGHT: 66 IN | RESPIRATION RATE: 18 BRPM | BODY MASS INDEX: 32.47 KG/M2 | OXYGEN SATURATION: 96 % | WEIGHT: 202 LBS | SYSTOLIC BLOOD PRESSURE: 118 MMHG | DIASTOLIC BLOOD PRESSURE: 77 MMHG | HEART RATE: 84 BPM

## 2024-12-19 DIAGNOSIS — E03.9 ACQUIRED HYPOTHYROIDISM: Primary | ICD-10-CM

## 2024-12-19 DIAGNOSIS — I10 PRIMARY HYPERTENSION: ICD-10-CM

## 2024-12-19 DIAGNOSIS — Z12.31 BREAST CANCER SCREENING BY MAMMOGRAM: ICD-10-CM

## 2024-12-19 DIAGNOSIS — Z00.00 MEDICARE ANNUAL WELLNESS VISIT, SUBSEQUENT: ICD-10-CM

## 2024-12-19 DIAGNOSIS — I48.0 PAROXYSMAL ATRIAL FIBRILLATION (HCC): ICD-10-CM

## 2024-12-19 DIAGNOSIS — K21.9 GASTROESOPHAGEAL REFLUX DISEASE WITHOUT ESOPHAGITIS: ICD-10-CM

## 2024-12-19 SDOH — ECONOMIC STABILITY: FOOD INSECURITY: WITHIN THE PAST 12 MONTHS, YOU WORRIED THAT YOUR FOOD WOULD RUN OUT BEFORE YOU GOT MONEY TO BUY MORE.: NEVER TRUE

## 2024-12-19 SDOH — ECONOMIC STABILITY: FOOD INSECURITY: WITHIN THE PAST 12 MONTHS, THE FOOD YOU BOUGHT JUST DIDN'T LAST AND YOU DIDN'T HAVE MONEY TO GET MORE.: NEVER TRUE

## 2024-12-19 SDOH — ECONOMIC STABILITY: INCOME INSECURITY: HOW HARD IS IT FOR YOU TO PAY FOR THE VERY BASICS LIKE FOOD, HOUSING, MEDICAL CARE, AND HEATING?: NOT HARD AT ALL

## 2024-12-19 ASSESSMENT — PATIENT HEALTH QUESTIONNAIRE - PHQ9
SUM OF ALL RESPONSES TO PHQ QUESTIONS 1-9: 0
4. FEELING TIRED OR HAVING LITTLE ENERGY: NOT AT ALL
SUM OF ALL RESPONSES TO PHQ QUESTIONS 1-9: 0
7. TROUBLE CONCENTRATING ON THINGS, SUCH AS READING THE NEWSPAPER OR WATCHING TELEVISION: NOT AT ALL
3. TROUBLE FALLING OR STAYING ASLEEP: NOT AT ALL
SUM OF ALL RESPONSES TO PHQ QUESTIONS 1-9: 0
5. POOR APPETITE OR OVEREATING: NOT AT ALL
1. LITTLE INTEREST OR PLEASURE IN DOING THINGS: NOT AT ALL
2. FEELING DOWN, DEPRESSED OR HOPELESS: NOT AT ALL
SUM OF ALL RESPONSES TO PHQ9 QUESTIONS 1 & 2: 0
SUM OF ALL RESPONSES TO PHQ QUESTIONS 1-9: 0
8. MOVING OR SPEAKING SO SLOWLY THAT OTHER PEOPLE COULD HAVE NOTICED. OR THE OPPOSITE, BEING SO FIGETY OR RESTLESS THAT YOU HAVE BEEN MOVING AROUND A LOT MORE THAN USUAL: NOT AT ALL
10. IF YOU CHECKED OFF ANY PROBLEMS, HOW DIFFICULT HAVE THESE PROBLEMS MADE IT FOR YOU TO DO YOUR WORK, TAKE CARE OF THINGS AT HOME, OR GET ALONG WITH OTHER PEOPLE: NOT DIFFICULT AT ALL
9. THOUGHTS THAT YOU WOULD BE BETTER OFF DEAD, OR OF HURTING YOURSELF: NOT AT ALL
6. FEELING BAD ABOUT YOURSELF - OR THAT YOU ARE A FAILURE OR HAVE LET YOURSELF OR YOUR FAMILY DOWN: NOT AT ALL

## 2024-12-19 ASSESSMENT — ENCOUNTER SYMPTOMS
SHORTNESS OF BREATH: 0
ABDOMINAL PAIN: 0
COUGH: 0
NAUSEA: 0
SORE THROAT: 0

## 2024-12-19 NOTE — PROGRESS NOTES
General (Hospitalist)  Shanta Morales MD as PCP - OBGYN (Obstetrics & Gynecology)  Duane Ratliff MD as PCP - Empaneled Provider  Dennis Atkinson MD as Surgeon (General Surgery)      Reviewed and updated this visit:  Tobacco  Allergies  Meds  Med Hx  Surg Hx  Soc Hx  Fam Hx

## 2025-01-15 ENCOUNTER — HOSPITAL ENCOUNTER (OUTPATIENT)
Dept: GENERAL RADIOLOGY | Age: 70
Discharge: HOME | End: 2025-01-17
Payer: MEDICARE

## 2025-01-15 ENCOUNTER — HOSPITAL ENCOUNTER (OUTPATIENT)
Age: 70
Discharge: HOME | End: 2025-01-15
Payer: MEDICARE

## 2025-01-15 ENCOUNTER — HOSPITAL ENCOUNTER (OUTPATIENT)
Age: 70
Discharge: HOME | End: 2025-01-17
Payer: MEDICARE

## 2025-01-15 DIAGNOSIS — E03.9 HYPOTHYROIDISM, UNSPECIFIED TYPE: ICD-10-CM

## 2025-01-15 DIAGNOSIS — I10 PRIMARY HYPERTENSION: ICD-10-CM

## 2025-01-15 DIAGNOSIS — E55.9 VITAMIN D DEFICIENCY, UNSPECIFIED: ICD-10-CM

## 2025-01-15 DIAGNOSIS — I48.0 PAROXYSMAL ATRIAL FIBRILLATION (HCC): ICD-10-CM

## 2025-01-15 LAB
25(OH)D3 SERPL-MCNC: 35.6 NG/ML (ref 30–100)
ALBUMIN SERPL-MCNC: 3.9 G/DL (ref 3.5–5.2)
ALP SERPL-CCNC: 101 U/L (ref 35–104)
ALT SERPL-CCNC: 17 U/L (ref 5–33)
ANION GAP SERPL CALCULATED.3IONS-SCNC: 5 MMOL/L (ref 9–17)
AST SERPL-CCNC: 13 U/L
BASOPHILS # BLD: 0.04 K/UL (ref 0–0.2)
BASOPHILS NFR BLD: 1 % (ref 0–2)
BILIRUB SERPL-MCNC: 0.5 MG/DL (ref 0.3–1.2)
BUN SERPL-MCNC: 14 MG/DL (ref 8–23)
BUN/CREAT SERPL: 18 (ref 9–20)
CALCIUM SERPL-MCNC: 9.6 MG/DL (ref 8.6–10.4)
CHLORIDE SERPL-SCNC: 104 MMOL/L (ref 98–107)
CHOLEST SERPL-MCNC: 152 MG/DL (ref 0–199)
CHOLESTEROL/HDL RATIO: 3.2
CO2 SERPL-SCNC: 32 MMOL/L (ref 20–31)
CREAT SERPL-MCNC: 0.8 MG/DL (ref 0.5–0.9)
EOSINOPHIL # BLD: 0 K/UL (ref 0–0.4)
EOSINOPHILS RELATIVE PERCENT: 0 % (ref 0–5)
ERYTHROCYTE [DISTWIDTH] IN BLOOD BY AUTOMATED COUNT: 14 % (ref 12.1–15.2)
GFR, ESTIMATED: 79 ML/MIN/1.73M2
GLUCOSE SERPL-MCNC: 97 MG/DL (ref 70–99)
HCT VFR BLD AUTO: 43.7 % (ref 36–46)
HDLC SERPL-MCNC: 47 MG/DL
HGB BLD-MCNC: 14.2 G/DL (ref 12–16)
IMM GRANULOCYTES # BLD AUTO: 0.01 K/UL (ref 0–0.3)
IMM GRANULOCYTES NFR BLD: 0 % (ref 0–5)
LDLC SERPL CALC-MCNC: 77 MG/DL (ref 0–100)
LYMPHOCYTES NFR BLD: 1.23 K/UL (ref 1–4.8)
LYMPHOCYTES RELATIVE PERCENT: 22 % (ref 15–40)
MAGNESIUM SERPL-MCNC: 2.1 MG/DL (ref 1.6–2.6)
MCH RBC QN AUTO: 27.8 PG (ref 26–34)
MCHC RBC AUTO-ENTMCNC: 32.5 G/DL (ref 31–37)
MCV RBC AUTO: 85.7 FL (ref 80–100)
MONOCYTES NFR BLD: 0.58 K/UL (ref 0–1)
MONOCYTES NFR BLD: 10 % (ref 4–8)
NEUTROPHILS NFR BLD: 67 % (ref 47–75)
NEUTS SEG NFR BLD: 3.85 K/UL (ref 2.5–7)
PLATELET # BLD AUTO: 254 K/UL (ref 140–450)
PMV BLD AUTO: 10.2 FL (ref 6–12)
POTASSIUM SERPL-SCNC: 4.4 MMOL/L (ref 3.7–5.3)
PROT SERPL-MCNC: 7.9 G/DL (ref 6.4–8.3)
RBC # BLD AUTO: 5.1 M/UL (ref 4–5.2)
SODIUM SERPL-SCNC: 141 MMOL/L (ref 135–144)
TRIGL SERPL-MCNC: 139 MG/DL
TSH SERPL DL<=0.05 MIU/L-ACNC: 2.05 UIU/ML (ref 0.3–5)
VLDLC SERPL CALC-MCNC: 28 MG/DL (ref 1–30)
WBC OTHER # BLD: 5.7 K/UL (ref 3.5–11)

## 2025-01-15 PROCEDURE — 82306 VITAMIN D 25 HYDROXY: CPT

## 2025-01-15 PROCEDURE — 84443 ASSAY THYROID STIM HORMONE: CPT

## 2025-01-15 PROCEDURE — 93005 ELECTROCARDIOGRAM TRACING: CPT

## 2025-01-15 PROCEDURE — 83735 ASSAY OF MAGNESIUM: CPT

## 2025-01-15 PROCEDURE — 71046 X-RAY EXAM CHEST 2 VIEWS: CPT

## 2025-01-15 PROCEDURE — 85025 COMPLETE CBC W/AUTO DIFF WBC: CPT

## 2025-01-15 PROCEDURE — 80053 COMPREHEN METABOLIC PANEL: CPT

## 2025-01-15 PROCEDURE — 80061 LIPID PANEL: CPT

## 2025-01-15 PROCEDURE — 36415 COLL VENOUS BLD VENIPUNCTURE: CPT

## 2025-01-16 LAB
EKG ATRIAL RATE: 71 BPM
EKG P AXIS: 71 DEGREES
EKG P-R INTERVAL: 168 MS
EKG Q-T INTERVAL: 402 MS
EKG QRS DURATION: 110 MS
EKG QTC CALCULATION (BAZETT): 436 MS
EKG R AXIS: 59 DEGREES
EKG T AXIS: 70 DEGREES
EKG VENTRICULAR RATE: 71 BPM

## 2025-01-18 DIAGNOSIS — R07.89 XYPHOIDALGIA: ICD-10-CM

## 2025-01-20 RX ORDER — PREGABALIN 50 MG/1
50 CAPSULE ORAL 3 TIMES DAILY
Qty: 90 CAPSULE | Refills: 0 | Status: SHIPPED | OUTPATIENT
Start: 2025-01-20 | End: 2025-02-19

## 2025-01-22 ENCOUNTER — OFFICE VISIT (OUTPATIENT)
Dept: CARDIOLOGY CLINIC | Age: 70
End: 2025-01-22
Payer: MEDICARE

## 2025-01-22 VITALS
DIASTOLIC BLOOD PRESSURE: 80 MMHG | BODY MASS INDEX: 32.93 KG/M2 | WEIGHT: 204 LBS | RESPIRATION RATE: 16 BRPM | OXYGEN SATURATION: 92 % | HEART RATE: 67 BPM | SYSTOLIC BLOOD PRESSURE: 120 MMHG

## 2025-01-22 DIAGNOSIS — I10 PRIMARY HYPERTENSION: ICD-10-CM

## 2025-01-22 DIAGNOSIS — J45.901 ASTHMA WITH ACUTE EXACERBATION, UNSPECIFIED ASTHMA SEVERITY, UNSPECIFIED WHETHER PERSISTENT: ICD-10-CM

## 2025-01-22 DIAGNOSIS — I48.0 PAROXYSMAL ATRIAL FIBRILLATION (HCC): Primary | ICD-10-CM

## 2025-01-22 DIAGNOSIS — E03.9 HYPOTHYROIDISM, UNSPECIFIED TYPE: ICD-10-CM

## 2025-01-22 DIAGNOSIS — E55.9 VITAMIN D DEFICIENCY, UNSPECIFIED: ICD-10-CM

## 2025-01-22 PROCEDURE — 1036F TOBACCO NON-USER: CPT | Performed by: NURSE PRACTITIONER

## 2025-01-22 PROCEDURE — 3017F COLORECTAL CA SCREEN DOC REV: CPT | Performed by: NURSE PRACTITIONER

## 2025-01-22 PROCEDURE — 1090F PRES/ABSN URINE INCON ASSESS: CPT | Performed by: NURSE PRACTITIONER

## 2025-01-22 PROCEDURE — 1159F MED LIST DOCD IN RCRD: CPT | Performed by: NURSE PRACTITIONER

## 2025-01-22 PROCEDURE — G8427 DOCREV CUR MEDS BY ELIG CLIN: HCPCS | Performed by: NURSE PRACTITIONER

## 2025-01-22 PROCEDURE — G8399 PT W/DXA RESULTS DOCUMENT: HCPCS | Performed by: NURSE PRACTITIONER

## 2025-01-22 PROCEDURE — 3079F DIAST BP 80-89 MM HG: CPT | Performed by: NURSE PRACTITIONER

## 2025-01-22 PROCEDURE — 99214 OFFICE O/P EST MOD 30 MIN: CPT | Performed by: NURSE PRACTITIONER

## 2025-01-22 PROCEDURE — 1123F ACP DISCUSS/DSCN MKR DOCD: CPT | Performed by: NURSE PRACTITIONER

## 2025-01-22 PROCEDURE — 3074F SYST BP LT 130 MM HG: CPT | Performed by: NURSE PRACTITIONER

## 2025-01-22 PROCEDURE — G8417 CALC BMI ABV UP PARAM F/U: HCPCS | Performed by: NURSE PRACTITIONER

## 2025-01-22 PROCEDURE — 1160F RVW MEDS BY RX/DR IN RCRD: CPT | Performed by: NURSE PRACTITIONER

## 2025-01-22 NOTE — PROGRESS NOTES
Courtney is here for 1 year follow up  No heart procedures or issues since here last year  No chest pain  Some SOB since had a cold    
01/15/2025    CREATININE 0.8 01/15/2025    BUN 14 01/15/2025    CO2 32 (H) 01/15/2025    TSH 2.05 01/15/2025    LABA1C 5.6 07/08/2019       Encounter Date: 01/15/25   EKG 12 Lead   Result Value    Ventricular Rate 71    Atrial Rate 71    P-R Interval 168    QRS Duration 110    Q-T Interval 402    QTc Calculation (Bazett) 436    P Axis 71    R Axis 59    T Axis 70    Narrative    Normal sinus rhythm  Normal ECG  When compared with ECG of 19-JAN-2024 09:33,  No significant change was found     Chest X-ray completed on 01/15/2025:  1. Normal sized heart.  2. Clear lungs.     IMPRESSION:     1. Paroxysmal atrial fibrillation when she was hospitalized for pneumonia on 09/15/2017, with no known further episodes.    2. Anticoagulated with Xarelto because of CHADS score of 3.   3. Normal LV function at 55%   4. Normal Lexiscan stress test.   2. Hypothyroidism, unspecified type - last TSH 2.05 on 01/15/2025.   3. Primary hypertension - Well controlled.   4. Vitamin D deficiency - last Vitamin D level 35.6 on 01/15/2025.     PLAN:         Paroxysmal Atrial Fibrillation: Rate Control Asymptomatic  Calcium Channel Blocker: Continue Cardizem 30 mg twice a day.    [id number] Score for Atrial Fibrillation Stroke Risk   Risk   Factors  Component Value   C CHF No 0   H HTN Yes 1   A2 Age >= 75 No,  (69 y.o.) 0   D DM No 0   S2 Prior Stroke/TIA No 0   V Vascular Disease No 0   A Age 65-74 Yes,  (69 y.o.) 1   Sc Sex female 1    [id number]  Score  3   Score last updated 1/22/24 10:34 AM EST  Click here for a link to the UpToDate guideline \"Atrial Fibrillation: Anticoagulation therapy to prevent embolization    Disclaimer: Risk Score calculation is dependent on accuracy of patient problem list and past encounter diagnosis.   Stroke Risk: CHADS2-VASc Score: 3/9 (3.2% stroke risk)  Anticoagulation: Continue Riveroxaban (Xarelto): 20 mg once daily with largest meal (typically dinner).  2.  No change in medications.  3.  Will follow up

## 2025-01-23 DIAGNOSIS — N90.4 VULVAR DYSTROPHY: ICD-10-CM

## 2025-01-23 RX ORDER — LIDOCAINE 50 MG/G
OINTMENT TOPICAL
Qty: 35.44 G | Refills: 3 | Status: SHIPPED | OUTPATIENT
Start: 2025-01-23

## 2025-02-04 DIAGNOSIS — E03.9 ACQUIRED HYPOTHYROIDISM: ICD-10-CM

## 2025-02-04 RX ORDER — LEVOTHYROXINE SODIUM 50 UG/1
50 TABLET ORAL DAILY
Qty: 90 TABLET | Refills: 1 | Status: SHIPPED | OUTPATIENT
Start: 2025-02-04

## 2025-03-03 RX ORDER — LANOLIN ALCOHOL/MO/W.PET/CERES
100 CREAM (GRAM) TOPICAL DAILY
Qty: 30 TABLET | Refills: 2 | Status: SHIPPED | OUTPATIENT
Start: 2025-03-03 | End: 2025-06-01

## 2025-03-11 ENCOUNTER — OFFICE VISIT (OUTPATIENT)
Dept: FAMILY MEDICINE CLINIC | Age: 70
End: 2025-03-11
Payer: MEDICARE

## 2025-03-11 VITALS
SYSTOLIC BLOOD PRESSURE: 128 MMHG | RESPIRATION RATE: 18 BRPM | BODY MASS INDEX: 32.93 KG/M2 | DIASTOLIC BLOOD PRESSURE: 82 MMHG | HEIGHT: 66 IN | OXYGEN SATURATION: 95 % | HEART RATE: 69 BPM

## 2025-03-11 DIAGNOSIS — R10.11 RIGHT UPPER QUADRANT ABDOMINAL PAIN: ICD-10-CM

## 2025-03-11 DIAGNOSIS — F32.1 CURRENT MODERATE EPISODE OF MAJOR DEPRESSIVE DISORDER, UNSPECIFIED WHETHER RECURRENT (HCC): ICD-10-CM

## 2025-03-11 DIAGNOSIS — I48.91 ATRIAL FIBRILLATION, UNSPECIFIED TYPE (HCC): ICD-10-CM

## 2025-03-11 DIAGNOSIS — K21.9 GASTROESOPHAGEAL REFLUX DISEASE WITHOUT ESOPHAGITIS: Primary | ICD-10-CM

## 2025-03-11 PROCEDURE — 1159F MED LIST DOCD IN RCRD: CPT | Performed by: INTERNAL MEDICINE

## 2025-03-11 PROCEDURE — 3074F SYST BP LT 130 MM HG: CPT | Performed by: INTERNAL MEDICINE

## 2025-03-11 PROCEDURE — 3079F DIAST BP 80-89 MM HG: CPT | Performed by: INTERNAL MEDICINE

## 2025-03-11 PROCEDURE — 99214 OFFICE O/P EST MOD 30 MIN: CPT | Performed by: INTERNAL MEDICINE

## 2025-03-11 PROCEDURE — G8427 DOCREV CUR MEDS BY ELIG CLIN: HCPCS | Performed by: INTERNAL MEDICINE

## 2025-03-11 PROCEDURE — 1090F PRES/ABSN URINE INCON ASSESS: CPT | Performed by: INTERNAL MEDICINE

## 2025-03-11 PROCEDURE — 1123F ACP DISCUSS/DSCN MKR DOCD: CPT | Performed by: INTERNAL MEDICINE

## 2025-03-11 PROCEDURE — 3017F COLORECTAL CA SCREEN DOC REV: CPT | Performed by: INTERNAL MEDICINE

## 2025-03-11 PROCEDURE — G8417 CALC BMI ABV UP PARAM F/U: HCPCS | Performed by: INTERNAL MEDICINE

## 2025-03-11 PROCEDURE — G8399 PT W/DXA RESULTS DOCUMENT: HCPCS | Performed by: INTERNAL MEDICINE

## 2025-03-11 PROCEDURE — 1036F TOBACCO NON-USER: CPT | Performed by: INTERNAL MEDICINE

## 2025-03-11 RX ORDER — ONDANSETRON 4 MG/1
4 TABLET, ORALLY DISINTEGRATING ORAL 3 TIMES DAILY PRN
Qty: 21 TABLET | Refills: 0 | Status: SHIPPED | OUTPATIENT
Start: 2025-03-11

## 2025-03-11 RX ORDER — OMEPRAZOLE 20 MG/1
20 CAPSULE, DELAYED RELEASE ORAL
Qty: 90 CAPSULE | Refills: 1 | Status: SHIPPED | OUTPATIENT
Start: 2025-03-11

## 2025-03-11 SDOH — ECONOMIC STABILITY: FOOD INSECURITY: WITHIN THE PAST 12 MONTHS, YOU WORRIED THAT YOUR FOOD WOULD RUN OUT BEFORE YOU GOT MONEY TO BUY MORE.: NEVER TRUE

## 2025-03-11 SDOH — ECONOMIC STABILITY: FOOD INSECURITY: WITHIN THE PAST 12 MONTHS, THE FOOD YOU BOUGHT JUST DIDN'T LAST AND YOU DIDN'T HAVE MONEY TO GET MORE.: NEVER TRUE

## 2025-03-11 ASSESSMENT — PATIENT HEALTH QUESTIONNAIRE - PHQ9
3. TROUBLE FALLING OR STAYING ASLEEP: SEVERAL DAYS
9. THOUGHTS THAT YOU WOULD BE BETTER OFF DEAD, OR OF HURTING YOURSELF: NOT AT ALL
10. IF YOU CHECKED OFF ANY PROBLEMS, HOW DIFFICULT HAVE THESE PROBLEMS MADE IT FOR YOU TO DO YOUR WORK, TAKE CARE OF THINGS AT HOME, OR GET ALONG WITH OTHER PEOPLE: SOMEWHAT DIFFICULT
2. FEELING DOWN, DEPRESSED OR HOPELESS: SEVERAL DAYS
1. LITTLE INTEREST OR PLEASURE IN DOING THINGS: SEVERAL DAYS
8. MOVING OR SPEAKING SO SLOWLY THAT OTHER PEOPLE COULD HAVE NOTICED. OR THE OPPOSITE, BEING SO FIGETY OR RESTLESS THAT YOU HAVE BEEN MOVING AROUND A LOT MORE THAN USUAL: NOT AT ALL
7. TROUBLE CONCENTRATING ON THINGS, SUCH AS READING THE NEWSPAPER OR WATCHING TELEVISION: NOT AT ALL
4. FEELING TIRED OR HAVING LITTLE ENERGY: SEVERAL DAYS
SUM OF ALL RESPONSES TO PHQ QUESTIONS 1-9: 4
5. POOR APPETITE OR OVEREATING: NOT AT ALL
6. FEELING BAD ABOUT YOURSELF - OR THAT YOU ARE A FAILURE OR HAVE LET YOURSELF OR YOUR FAMILY DOWN: NOT AT ALL
SUM OF ALL RESPONSES TO PHQ QUESTIONS 1-9: 4

## 2025-03-11 ASSESSMENT — ENCOUNTER SYMPTOMS
BELCHING: 1
COUGH: 0
SORE THROAT: 0
NAUSEA: 1
HEARTBURN: 1
ABDOMINAL PAIN: 1
SHORTNESS OF BREATH: 0
CHOKING: 0

## 2025-03-11 NOTE — PATIENT INSTRUCTIONS
SURVEY:    You may be receiving a survey from [facility] regarding your visit today.    Please complete the survey to enable us to provide the highest quality of care to you and your family.    If you cannot score us a very good on any question, please call the office to discuss how we could have made your experience a very good one.    Thank you.    [facility]  MD Manuel Way, DO Haylee Blancas, CNP  Monty Avila, MD Florecita Morales, APRN-CNP  Yajaira Hannah, Practice Manager  Stella, FELICITY Ross, CCMFILI Urias, CMA  Joana, CMA  Aaron, PSC   Lakesha, LPN  Kassandra, CMA

## 2025-03-11 NOTE — PROGRESS NOTES
HPI Notes    Name: Courtney Oconnor  : 1955         Chief Complaint:     Chief Complaint   Patient presents with    Abdominal Pain     Patient complains on pain on R side of ribs that travels to stomach, like air. Possible air.       History of Present Illness:        Courtney presents to office for evaluation of pain /discomfort in the right side of her abdomen    Says she went to [place] last week. She ate a lot of spicy food.   She feels there is \" gas \" moving from the stomach to the right side of her abdomen. Has a lot of belching and heartburn  S/p cholecystectomy in the past.   Has a h/o GERD and stopped taking Omeprazole     Abdominal Pain  This is a new problem. The current episode started in the past 7 days. The onset quality is gradual. The problem occurs 2 to 4 times per day. The problem has been unchanged. The pain is located in the RUQ and epigastric region. The pain is mild. The quality of the pain is aching and burning. The abdominal pain radiates to the epigastric region. Associated symptoms include belching and nausea. Pertinent negatives include no dysuria, fever or headaches. Her past medical history is significant for GERD.   Gastroesophageal Reflux  She complains of abdominal pain, belching, heartburn and nausea. She reports no chest pain, no choking, no coughing or no sore throat. This is a chronic problem. The current episode started more than 1 year ago. The problem occurs constantly. The problem has been gradually worsening. The heartburn is of moderate intensity. The heartburn does not wake her from sleep. The heartburn does not limit her activity. The heartburn changes with position. The symptoms are aggravated by certain foods and lying down. Risk factors include obesity. She has tried nothing for the symptoms. The treatment provided no relief.           Past Medical History:     Past Medical History:   Diagnosis Date    Arthritis     Asthma     Chickenpox     Hypertension

## 2025-03-24 DIAGNOSIS — N90.4 VULVAR DYSTROPHY: ICD-10-CM

## 2025-03-24 RX ORDER — LIDOCAINE 50 MG/G
OINTMENT TOPICAL
Qty: 35.44 G | Refills: 3 | OUTPATIENT
Start: 2025-03-24

## 2025-03-24 RX ORDER — LIDOCAINE 50 MG/G
OINTMENT TOPICAL
Qty: 1 EACH | Refills: 3 | Status: SHIPPED | OUTPATIENT
Start: 2025-03-24

## 2025-03-24 NOTE — TELEPHONE ENCOUNTER
Health Maintenance   Topic Date Due    DTaP/Tdap/Td vaccine (1 - Tdap) Never done    Shingles vaccine (1 of 2) Never done    Respiratory Syncytial Virus (RSV) Pregnant or age 60 yrs+ (1 - Risk 60-74 years 1-dose series) Never done    Flu vaccine (1) 08/01/2024    COVID-19 Vaccine (3 - 2024-25 season) 09/01/2024    Annual Wellness Visit (Medicare)  12/20/2025    Breast cancer screen  02/21/2026    Depression Monitoring  03/11/2026    Lipids  01/15/2030    Colorectal Cancer Screen  06/15/2033    DEXA (modify frequency per FRAX score)  Completed    Pneumococcal 50+ years Vaccine  Completed    Hepatitis C screen  Addressed    Hepatitis A vaccine  Aged Out    Hepatitis B vaccine  Aged Out    Hib vaccine  Aged Out    Polio vaccine  Aged Out    Meningococcal (ACWY) vaccine  Aged Out    Meningococcal B vaccine  Aged Out    A1C test (Diabetic or Prediabetic)  Discontinued    Diabetes screen  Discontinued             (applicable per patient's age: Cancer Screenings, Depression Screening, Fall Risk Screening, Immunizations)    Hemoglobin A1C (%)   Date Value   07/08/2019 5.6   03/26/2018 5.9   09/25/2017 6.0     AST (U/L)   Date Value   01/15/2025 13     ALT (U/L)   Date Value   01/15/2025 17     BUN (mg/dL)   Date Value   01/15/2025 14      (goal A1C is < 7)   (goal LDL is <100) need 30-50% reduction from baseline     BP Readings from Last 3 Encounters:   03/11/25 128/82   01/22/25 120/80   12/19/24 118/77    (goal /80)      All Future Testing planned in [facility]:  Lab Frequency Next Occurrence   ALVARO DIGITAL SCREEN W OR WO CAD BILATERAL Once 02/21/2024   XR CHEST (2 VW) Once 07/01/2024   ALVARO DIGITAL SCREEN W OR WO CAD BILATERAL Once 03/19/2025   Vitamin D 25 Hydroxy Once 01/22/2026   Magnesium Once 01/22/2026   TSH Once 01/22/2026   Lipid Panel Once 01/22/2026   Comprehensive Metabolic Panel Once 01/22/2026   CBC with Auto Differential Once 01/22/2025   EKG 12 Lead Once 01/22/2026   XR CHEST (2 VW) Once 01/22/2026

## 2025-03-26 ENCOUNTER — HOSPITAL ENCOUNTER (OUTPATIENT)
Dept: MAMMOGRAPHY | Age: 70
Discharge: HOME | End: 2025-03-28
Payer: MEDICARE

## 2025-03-26 DIAGNOSIS — Z12.31 BREAST CANCER SCREENING BY MAMMOGRAM: ICD-10-CM

## 2025-03-26 PROCEDURE — 77063 BREAST TOMOSYNTHESIS BI: CPT

## 2025-03-31 DIAGNOSIS — I48.0 PAROXYSMAL ATRIAL FIBRILLATION (HCC): ICD-10-CM

## 2025-04-01 RX ORDER — DILTIAZEM HYDROCHLORIDE 30 MG/1
TABLET, FILM COATED ORAL
Qty: 180 TABLET | Refills: 1 | Status: SHIPPED | OUTPATIENT
Start: 2025-04-01

## 2025-04-01 RX ORDER — CHOLECALCIFEROL (VITAMIN D3) 25 MCG
1 TABLET ORAL DAILY
Qty: 30 TABLET | Refills: 5 | Status: SHIPPED | OUTPATIENT
Start: 2025-04-01

## 2025-04-02 ENCOUNTER — RESULTS FOLLOW-UP (OUTPATIENT)
Dept: FAMILY MEDICINE CLINIC | Age: 70
End: 2025-04-02

## 2025-04-07 RX ORDER — FAMOTIDINE 20 MG/1
20 TABLET, FILM COATED ORAL 2 TIMES DAILY
Qty: 60 TABLET | Refills: 3 | Status: SHIPPED | OUTPATIENT
Start: 2025-04-07

## 2025-05-15 ENCOUNTER — OFFICE VISIT (OUTPATIENT)
Dept: FAMILY MEDICINE CLINIC | Age: 70
End: 2025-05-15
Payer: MEDICARE

## 2025-05-15 VITALS
HEIGHT: 66 IN | OXYGEN SATURATION: 94 % | HEART RATE: 96 BPM | SYSTOLIC BLOOD PRESSURE: 118 MMHG | BODY MASS INDEX: 32.62 KG/M2 | DIASTOLIC BLOOD PRESSURE: 82 MMHG | RESPIRATION RATE: 18 BRPM | WEIGHT: 203 LBS

## 2025-05-15 DIAGNOSIS — R60.0 EDEMA OF BOTH LEGS: ICD-10-CM

## 2025-05-15 DIAGNOSIS — R10.31 RIGHT LOWER QUADRANT ABDOMINAL PAIN: Primary | ICD-10-CM

## 2025-05-15 PROCEDURE — 1159F MED LIST DOCD IN RCRD: CPT | Performed by: INTERNAL MEDICINE

## 2025-05-15 PROCEDURE — 3074F SYST BP LT 130 MM HG: CPT | Performed by: INTERNAL MEDICINE

## 2025-05-15 PROCEDURE — 1036F TOBACCO NON-USER: CPT | Performed by: INTERNAL MEDICINE

## 2025-05-15 PROCEDURE — 1123F ACP DISCUSS/DSCN MKR DOCD: CPT | Performed by: INTERNAL MEDICINE

## 2025-05-15 PROCEDURE — 1090F PRES/ABSN URINE INCON ASSESS: CPT | Performed by: INTERNAL MEDICINE

## 2025-05-15 PROCEDURE — G8399 PT W/DXA RESULTS DOCUMENT: HCPCS | Performed by: INTERNAL MEDICINE

## 2025-05-15 PROCEDURE — 3079F DIAST BP 80-89 MM HG: CPT | Performed by: INTERNAL MEDICINE

## 2025-05-15 PROCEDURE — 99214 OFFICE O/P EST MOD 30 MIN: CPT | Performed by: INTERNAL MEDICINE

## 2025-05-15 PROCEDURE — G8427 DOCREV CUR MEDS BY ELIG CLIN: HCPCS | Performed by: INTERNAL MEDICINE

## 2025-05-15 PROCEDURE — 3017F COLORECTAL CA SCREEN DOC REV: CPT | Performed by: INTERNAL MEDICINE

## 2025-05-15 PROCEDURE — G8417 CALC BMI ABV UP PARAM F/U: HCPCS | Performed by: INTERNAL MEDICINE

## 2025-05-15 RX ORDER — FUROSEMIDE 20 MG/1
20 TABLET ORAL DAILY PRN
Qty: 30 TABLET | Refills: 3 | Status: SHIPPED | OUTPATIENT
Start: 2025-05-15

## 2025-05-15 ASSESSMENT — ENCOUNTER SYMPTOMS
SORE THROAT: 0
SHORTNESS OF BREATH: 0
COUGH: 0
NAUSEA: 0
ABDOMINAL PAIN: 1

## 2025-05-15 NOTE — PATIENT INSTRUCTIONS
SURVEY:    You may be receiving a survey from [facility] regarding your visit today.    Please complete the survey to enable us to provide the highest quality of care to you and your family.    If you cannot score us a very good on any question, please call the office to discuss how we could have made your experience a very good one.    Thank you.    [facility]  MD Manuel Way, DO Haylee Blancas, CNP  Monty Avila, MD Florecita Morales, APRN-CNP  Yajaira Hannah, Practice Manager  Stella, FELICITY Ross, CCMFILI Urias, CMA  Joana, CMA  Aaron, PSC   Lakesha, LPN  Kassandra, CMA

## 2025-05-15 NOTE — PROGRESS NOTES
Thyroid: No thyromegaly.   Cardiovascular:      Rate and Rhythm: Normal rate and regular rhythm.      Heart sounds: Normal heart sounds. No murmur heard.  Pulmonary:      Effort: Pulmonary effort is normal.      Breath sounds: Normal breath sounds. No wheezing or rales.   Abdominal:      General: Bowel sounds are normal. There is no distension.      Palpations: Abdomen is soft. There is no mass.      Tenderness: There is no abdominal tenderness. There is no right CVA tenderness or left CVA tenderness.      Hernia: No hernia is present.   Musculoskeletal:         General: Normal range of motion.      Right lower leg: Edema present.      Left lower leg: Edema present.   Lymphadenopathy:      Cervical: No cervical adenopathy.   Skin:     General: Skin is warm and dry.      Findings: No rash.   Neurological:      General: No focal deficit present.      Mental Status: She is alert and oriented to person, place, and time. Mental status is at baseline.   Psychiatric:         Behavior: Behavior normal.         Judgment: Judgment normal.               Data:     Lab Results   Component Value Date/Time     01/15/2025 10:42 AM    K 4.4 01/15/2025 10:42 AM     01/15/2025 10:42 AM    CO2 32 01/15/2025 10:42 AM    BUN 14 01/15/2025 10:42 AM    CREATININE 0.8 01/15/2025 10:42 AM    GLUCOSE 97 01/15/2025 10:42 AM    BILITOT 0.5 01/15/2025 10:42 AM    ALKPHOS 101 01/15/2025 10:42 AM    AST 13 01/15/2025 10:42 AM    ALT 17 01/15/2025 10:42 AM     Lab Results   Component Value Date/Time    WBC 5.7 01/15/2025 10:42 AM    RBC 5.10 01/15/2025 10:42 AM    HGB 14.2 01/15/2025 10:42 AM    HCT 43.7 01/15/2025 10:42 AM    MCV 85.7 01/15/2025 10:42 AM    MCH 27.8 01/15/2025 10:42 AM    MCHC 32.5 01/15/2025 10:42 AM    RDW 14.0 01/15/2025 10:42 AM     01/15/2025 10:42 AM    MPV 10.2 01/15/2025 10:42 AM     Lab Results   Component Value Date/Time    TSH 2.05 01/15/2025 10:42 AM     Lab Results   Component Value Date/Time

## 2025-05-23 ENCOUNTER — HOSPITAL ENCOUNTER (OUTPATIENT)
Age: 70
Discharge: HOME | End: 2025-05-23
Attending: INTERNAL MEDICINE
Payer: MEDICARE

## 2025-05-23 ENCOUNTER — HOSPITAL ENCOUNTER (OUTPATIENT)
Dept: CT IMAGING | Age: 70
Discharge: HOME | End: 2025-05-25
Attending: INTERNAL MEDICINE
Payer: MEDICARE

## 2025-05-23 DIAGNOSIS — I10 PRIMARY HYPERTENSION: ICD-10-CM

## 2025-05-23 DIAGNOSIS — R10.31 RIGHT LOWER QUADRANT ABDOMINAL PAIN: ICD-10-CM

## 2025-05-23 DIAGNOSIS — E55.9 VITAMIN D DEFICIENCY, UNSPECIFIED: ICD-10-CM

## 2025-05-23 DIAGNOSIS — I48.0 PAROXYSMAL ATRIAL FIBRILLATION (HCC): ICD-10-CM

## 2025-05-23 DIAGNOSIS — E03.9 HYPOTHYROIDISM, UNSPECIFIED TYPE: ICD-10-CM

## 2025-05-23 LAB
25(OH)D3 SERPL-MCNC: 31.6 NG/ML (ref 30–100)
ALBUMIN SERPL-MCNC: 4 G/DL (ref 3.5–5.2)
ALBUMIN/GLOB SERPL: 1.1 {RATIO} (ref 1–2.5)
ALP SERPL-CCNC: 98 U/L (ref 35–104)
ALT SERPL-CCNC: 20 U/L (ref 5–33)
ANION GAP SERPL CALCULATED.3IONS-SCNC: 6 MMOL/L (ref 9–17)
ANION GAP SERPL CALCULATED.3IONS-SCNC: 8 MMOL/L (ref 9–17)
AST SERPL-CCNC: 18 U/L
BASOPHILS # BLD: 0.01 K/UL (ref 0–0.2)
BASOPHILS NFR BLD: 0 % (ref 0–2)
BILIRUB SERPL-MCNC: 0.4 MG/DL (ref 0.3–1.2)
BUN SERPL-MCNC: 12 MG/DL (ref 8–23)
BUN SERPL-MCNC: 13 MG/DL (ref 8–23)
CALCIUM SERPL-MCNC: 9.1 MG/DL (ref 8.6–10.4)
CALCIUM SERPL-MCNC: 9.2 MG/DL (ref 8.6–10.4)
CHLORIDE SERPL-SCNC: 103 MMOL/L (ref 98–107)
CHLORIDE SERPL-SCNC: 104 MMOL/L (ref 98–107)
CHOLEST SERPL-MCNC: 144 MG/DL (ref 0–199)
CHOLESTEROL/HDL RATIO: 3.2
CO2 SERPL-SCNC: 30 MMOL/L (ref 20–31)
CO2 SERPL-SCNC: 32 MMOL/L (ref 20–31)
CREAT SERPL-MCNC: 0.9 MG/DL (ref 0.5–0.9)
CREAT SERPL-MCNC: 0.9 MG/DL (ref 0.5–0.9)
EOSINOPHIL # BLD: 0 K/UL (ref 0–0.4)
EOSINOPHILS RELATIVE PERCENT: 0 % (ref 0–5)
ERYTHROCYTE [DISTWIDTH] IN BLOOD BY AUTOMATED COUNT: 14 % (ref 12.1–15.2)
GFR, ESTIMATED: 68 ML/MIN/1.73M2
GFR, ESTIMATED: 68 ML/MIN/1.73M2
GLUCOSE SERPL-MCNC: 101 MG/DL (ref 70–99)
GLUCOSE SERPL-MCNC: 102 MG/DL (ref 70–99)
HCT VFR BLD AUTO: 45.7 % (ref 36–46)
HDLC SERPL-MCNC: 45 MG/DL
HGB BLD-MCNC: 14.4 G/DL (ref 12–16)
IMM GRANULOCYTES # BLD AUTO: 0 K/UL (ref 0–0.3)
IMM GRANULOCYTES NFR BLD: 0 % (ref 0–5)
LDLC SERPL CALC-MCNC: 76 MG/DL (ref 0–100)
LYMPHOCYTES NFR BLD: 0.98 K/UL (ref 1–4.8)
LYMPHOCYTES RELATIVE PERCENT: 25 % (ref 15–40)
MAGNESIUM SERPL-MCNC: 2.2 MG/DL (ref 1.6–2.6)
MCH RBC QN AUTO: 27.4 PG (ref 26–34)
MCHC RBC AUTO-ENTMCNC: 31.5 G/DL (ref 31–37)
MCV RBC AUTO: 87 FL (ref 80–100)
MONOCYTES NFR BLD: 0.47 K/UL (ref 0–1)
MONOCYTES NFR BLD: 12 % (ref 4–8)
NEUTROPHILS NFR BLD: 63 % (ref 47–75)
NEUTS SEG NFR BLD: 2.51 K/UL (ref 2.5–7)
PLATELET # BLD AUTO: 259 K/UL (ref 140–450)
PMV BLD AUTO: 10.3 FL (ref 6–12)
POTASSIUM SERPL-SCNC: 5.2 MMOL/L (ref 3.7–5.3)
POTASSIUM SERPL-SCNC: 5.3 MMOL/L (ref 3.7–5.3)
PROT SERPL-MCNC: 7.6 G/DL (ref 6.4–8.3)
RBC # BLD AUTO: 5.25 M/UL (ref 4–5.2)
SODIUM SERPL-SCNC: 141 MMOL/L (ref 135–144)
SODIUM SERPL-SCNC: 142 MMOL/L (ref 135–144)
TRIGL SERPL-MCNC: 113 MG/DL
TSH SERPL DL<=0.05 MIU/L-ACNC: 1.65 UIU/ML (ref 0.27–4.2)
VLDLC SERPL CALC-MCNC: 23 MG/DL (ref 1–30)
WBC OTHER # BLD: 4 K/UL (ref 3.5–11)

## 2025-05-23 PROCEDURE — 80053 COMPREHEN METABOLIC PANEL: CPT

## 2025-05-23 PROCEDURE — 6360000004 HC RX CONTRAST MEDICATION: Performed by: INTERNAL MEDICINE

## 2025-05-23 PROCEDURE — 36415 COLL VENOUS BLD VENIPUNCTURE: CPT

## 2025-05-23 PROCEDURE — 84443 ASSAY THYROID STIM HORMONE: CPT

## 2025-05-23 PROCEDURE — 83735 ASSAY OF MAGNESIUM: CPT

## 2025-05-23 PROCEDURE — 82306 VITAMIN D 25 HYDROXY: CPT

## 2025-05-23 PROCEDURE — 74177 CT ABD & PELVIS W/CONTRAST: CPT

## 2025-05-23 PROCEDURE — 80061 LIPID PANEL: CPT

## 2025-05-23 PROCEDURE — 80048 BASIC METABOLIC PNL TOTAL CA: CPT

## 2025-05-23 PROCEDURE — 85025 COMPLETE CBC W/AUTO DIFF WBC: CPT

## 2025-05-23 RX ORDER — IOPAMIDOL 755 MG/ML
75 INJECTION, SOLUTION INTRAVASCULAR
Status: COMPLETED | OUTPATIENT
Start: 2025-05-23 | End: 2025-05-23

## 2025-05-23 RX ADMIN — IOPAMIDOL 75 ML: 755 INJECTION, SOLUTION INTRAVENOUS at 10:10

## 2025-06-03 DIAGNOSIS — N28.89 LEFT RENAL MASS: Primary | ICD-10-CM

## 2025-06-19 ENCOUNTER — OFFICE VISIT (OUTPATIENT)
Dept: FAMILY MEDICINE CLINIC | Age: 70
End: 2025-06-19
Payer: MEDICARE

## 2025-06-19 VITALS
HEIGHT: 66 IN | DIASTOLIC BLOOD PRESSURE: 82 MMHG | RESPIRATION RATE: 18 BRPM | BODY MASS INDEX: 32.95 KG/M2 | SYSTOLIC BLOOD PRESSURE: 118 MMHG | WEIGHT: 205 LBS | HEART RATE: 85 BPM | OXYGEN SATURATION: 96 %

## 2025-06-19 DIAGNOSIS — J45.909 UNCOMPLICATED ASTHMA, UNSPECIFIED ASTHMA SEVERITY, UNSPECIFIED WHETHER PERSISTENT: ICD-10-CM

## 2025-06-19 DIAGNOSIS — K21.9 GASTROESOPHAGEAL REFLUX DISEASE WITHOUT ESOPHAGITIS: ICD-10-CM

## 2025-06-19 DIAGNOSIS — E03.9 ACQUIRED HYPOTHYROIDISM: ICD-10-CM

## 2025-06-19 DIAGNOSIS — I10 PRIMARY HYPERTENSION: Primary | ICD-10-CM

## 2025-06-19 DIAGNOSIS — I48.0 PAROXYSMAL ATRIAL FIBRILLATION (HCC): ICD-10-CM

## 2025-06-19 PROCEDURE — 99214 OFFICE O/P EST MOD 30 MIN: CPT | Performed by: INTERNAL MEDICINE

## 2025-06-19 PROCEDURE — G8399 PT W/DXA RESULTS DOCUMENT: HCPCS | Performed by: INTERNAL MEDICINE

## 2025-06-19 PROCEDURE — 1159F MED LIST DOCD IN RCRD: CPT | Performed by: INTERNAL MEDICINE

## 2025-06-19 PROCEDURE — G8417 CALC BMI ABV UP PARAM F/U: HCPCS | Performed by: INTERNAL MEDICINE

## 2025-06-19 PROCEDURE — 1090F PRES/ABSN URINE INCON ASSESS: CPT | Performed by: INTERNAL MEDICINE

## 2025-06-19 PROCEDURE — G8427 DOCREV CUR MEDS BY ELIG CLIN: HCPCS | Performed by: INTERNAL MEDICINE

## 2025-06-19 PROCEDURE — 3017F COLORECTAL CA SCREEN DOC REV: CPT | Performed by: INTERNAL MEDICINE

## 2025-06-19 PROCEDURE — 3074F SYST BP LT 130 MM HG: CPT | Performed by: INTERNAL MEDICINE

## 2025-06-19 PROCEDURE — 1123F ACP DISCUSS/DSCN MKR DOCD: CPT | Performed by: INTERNAL MEDICINE

## 2025-06-19 PROCEDURE — 1036F TOBACCO NON-USER: CPT | Performed by: INTERNAL MEDICINE

## 2025-06-19 PROCEDURE — 3079F DIAST BP 80-89 MM HG: CPT | Performed by: INTERNAL MEDICINE

## 2025-06-19 RX ORDER — PREGABALIN 75 MG/1
75 CAPSULE ORAL 2 TIMES DAILY
COMMUNITY
Start: 2025-04-12

## 2025-06-19 ASSESSMENT — ENCOUNTER SYMPTOMS
SORE THROAT: 0
COUGH: 0
SHORTNESS OF BREATH: 0
ABDOMINAL PAIN: 0
NAUSEA: 0

## 2025-06-19 NOTE — PATIENT INSTRUCTIONS
SURVEY:    You may be receiving a survey from [facility] regarding your visit today.    Please complete the survey to enable us to provide the highest quality of care to you and your family.    If you cannot score us a very good on any question, please call the office to discuss how we could have made your experience a very good one.    Thank you.    [facility]  MD Manuel Way, DO Haylee Blancas, CNP  Monty Avila, MD Florecita Morales, APRN-CNP  Yajaira Hannah, Practice Manager  Stella, FELICITY Ross, CCMFILI Urias, CMA  Joana, CMA  Aaron, PSC   Lakesha, LPN  Kassandra, CMA

## 2025-06-19 NOTE — PROGRESS NOTES
HPI Notes    Name: Courtney Oconnor  : 1955         Chief Complaint:     Chief Complaint   Patient presents with    Follow-up       History of Present Illness:        Courtney presents to office to follow-up for hypertension, GERD, hypothyroidism, history of asthma  She is a very pleasant [place]ian-speaking patient, she is accompanied by her daughter Elsie  who helps with interpretation.  She has a history of paroxysmal atrial fibrillation and currently on Xarelto.    Courtney confirms that she is compliant with her thyroid medication.  She  denies an increase in fatigue, constipation, increased skin dryness, or increased lower extremity edema.  The last TSH and free T4 were evaluated on 25. TSH was 1.65    Hypertension  This is a chronic problem. The current episode started more than 1 year ago. The problem is controlled. Pertinent negatives include no anxiety, chest pain, headaches, palpitations or shortness of breath. Agents associated with hypertension include thyroid hormones. Risk factors for coronary artery disease include obesity and post-menopausal state. Past treatments include calcium channel blockers. The current treatment provides significant improvement. There are no compliance problems.  There is no history of kidney disease, CAD/MI or CVA.   Asthma  There is no cough or shortness of breath. This is a chronic problem. The current episode started more than 1 year ago. The problem occurs rarely. Pertinent negatives include no appetite change, chest pain, dyspnea on exertion, fever, headaches or sore throat. Her symptoms are aggravated by URI and pollen. Her symptoms are alleviated by beta-agonist. She reports complete improvement on treatment. Her past medical history is significant for asthma.   Gastroesophageal Reflux  She reports no abdominal pain, no chest pain, no coughing, no nausea or no sore throat. This is a chronic problem. The current episode started more than 1 year ago. The problem

## 2025-06-20 ENCOUNTER — HOSPITAL ENCOUNTER (OUTPATIENT)
Dept: ULTRASOUND IMAGING | Age: 70
Discharge: HOME | End: 2025-06-22
Payer: MEDICARE

## 2025-06-20 DIAGNOSIS — N28.89 LEFT RENAL MASS: ICD-10-CM

## 2025-06-20 PROCEDURE — 76770 US EXAM ABDO BACK WALL COMP: CPT

## 2025-07-15 RX ORDER — CHOLECALCIFEROL (VITAMIN D3) 25 MCG
1 TABLET ORAL DAILY
Qty: 30 TABLET | Refills: 5 | Status: SHIPPED | OUTPATIENT
Start: 2025-07-15

## 2025-08-01 DIAGNOSIS — E03.9 ACQUIRED HYPOTHYROIDISM: ICD-10-CM

## 2025-08-01 RX ORDER — LEVOTHYROXINE SODIUM 50 UG/1
50 TABLET ORAL DAILY
Qty: 90 TABLET | Refills: 1 | Status: SHIPPED | OUTPATIENT
Start: 2025-08-01

## 2025-08-04 RX ORDER — FAMOTIDINE 20 MG/1
20 TABLET, FILM COATED ORAL 2 TIMES DAILY
Qty: 60 TABLET | Refills: 3 | Status: SHIPPED | OUTPATIENT
Start: 2025-08-04

## 2025-09-01 DIAGNOSIS — I48.91 ATRIAL FIBRILLATION, UNSPECIFIED TYPE (HCC): ICD-10-CM

## (undated) DEVICE — DRAPE,UTILTY,TAPE,15X26, 4EA/PK: Brand: MEDLINE

## (undated) DEVICE — STAPLER SKIN H3.9MM WIRE DIA0.58MM CRWN 6.9MM 35 STPL ROT

## (undated) DEVICE — SYRINGE MED 50ML LUERSLIP TIP

## (undated) DEVICE — SYSTEM VISIBILITY MULTPURP WARMS CLN AND DEFOG BLT IN LED LT

## (undated) DEVICE — NDLCTR: FOAM/MAG 40CT 64/CS: Brand: MEDICAL ACTION INDUSTRIES

## (undated) DEVICE — INTENDED FOR TISSUE SEPARATION, AND OTHER PROCEDURES THAT REQUIRE A SHARP SURGICAL BLADE TO PUNCTURE OR CUT.: Brand: BARD-PARKER ® CARBON RIB-BACK BLADES

## (undated) DEVICE — COVER,MAYO STAND,STERILE: Brand: MEDLINE

## (undated) DEVICE — AGENT HEMSTAT W4XL4IN OXIDIZED REGENERATED CELOS ABSRB SFT

## (undated) DEVICE — TROCAR ENDOSCP L100MM DIA12MM BLDELSS OBT RADLUC STBL SL

## (undated) DEVICE — DRAPE,LAP,CHOLE,W/TROUGHS,STERILE: Brand: MEDLINE

## (undated) DEVICE — SYRINGE MED 10ML TRNSLUC BRL PLUNG BLK MRK POLYPR CTRL

## (undated) DEVICE — TISSUE RETRIEVAL SYSTEM: Brand: INZII RETRIEVAL SYSTEM

## (undated) DEVICE — Device: Brand: DISPOSABLE ELECTROSURGICAL SNARE

## (undated) DEVICE — SINGLE-USE BIOPSY FORCEPS: Brand: RADIAL JAW 4

## (undated) DEVICE — GAUZE,SPONGE,4"X4",16PLY,XRAY,STRL,LF: Brand: MEDLINE

## (undated) DEVICE — GOWN,AURORA,NONRNF,XL,30/CS: Brand: MEDLINE

## (undated) DEVICE — TROCAR ENDOSCP L100MM DIA11MM BLDELSS STBL SL W HNDL

## (undated) DEVICE — GLOVE ORANGE PI 7 1/2   MSG9075

## (undated) DEVICE — APPLIER CLP M L L11.4IN DIA10MM ENDOSCP ROT MULT FOR LIG

## (undated) DEVICE — GAUZE SPONGES,8 PLY: Brand: CURITY

## (undated) DEVICE — DISSECTOR LAP DIA5MM BLNT TIP ENDOPATH

## (undated) DEVICE — ENDO KIT W/SYRINGE: Brand: MEDLINE INDUSTRIES, INC.

## (undated) DEVICE — SOLUTION IRRIG 1000ML STRL H2O USP PLAS POUR BTL

## (undated) DEVICE — 3M™ TEGADERM™ +PAD FILM DRESSING WITH NON-ADHERENT PAD, 3587, 3-1/2 IN X 4-1/8 IN (9 CM X 10.5 CM), 25/CAR, 4 CAR/CS: Brand: 3M™ TEGADERM™

## (undated) DEVICE — SUTURE VCRL SZ 3-0 L27IN ABSRB UD L36MM CT-1 1/2 CIR J258H

## (undated) DEVICE — 4-PORT MANIFOLD: Brand: NEPTUNE 2

## (undated) DEVICE — CHLORAPREP 26ML ORANGE

## (undated) DEVICE — HYBRID TUBING WITH CO2 CONNECTION FOR OLYMPUS 140/160/180/190 SERIES GI ENDOSCOPES WITH OLYMPUS AFU-100 , OLYMPUS OFP: Brand: ENDOGATOR HYBRID IRRIGATION TUBING

## (undated) DEVICE — PUMP SUC IRR TBNG L10FT W/ HNDPC ASSEMB STRYKEFLOW 2

## (undated) DEVICE — SKIN MARKER,REGULAR TIP WITH RULER: Brand: DEVON

## (undated) DEVICE — CLIP LIG L235CM RESOL 360 BX/20

## (undated) DEVICE — 3M™ TEGADERM™ +PAD FILM DRESSING WITH NON-ADHERENT PAD, 3586, 3-1/2 IN X 4 IN (9 CM X 10 CM), 25/CAR, 4 CAR/CS: Brand: 3M™ TEGADERM™

## (undated) DEVICE — ELECTRODE PT RET AD L9FT HI MOIST COND ADH HYDRGEL CORDED

## (undated) DEVICE — RESERVOIR 44103 VENT. INLET SIDE

## (undated) DEVICE — INSUFFLATION NEEDLE TO ESTABLISH PNEUMOPERITONEUM.: Brand: INSUFFLATION NEEDLE

## (undated) DEVICE — STANDARD HYPODERMIC NEEDLE,POLYPROPYLENE HUB: Brand: MONOJECT

## (undated) DEVICE — NEEDLE HYPO 18GA L1IN PNK POLYPR HUB S STL REG BVL STR W/O

## (undated) DEVICE — TABLE COVER: Brand: CONVERTORS

## (undated) DEVICE — KIT,ANTI FOG,W/SPONGE & FLUID,SOFT PACK: Brand: MEDLINE

## (undated) DEVICE — APPLIER CLP L SHFT DIA12MM 20 ROT MULT LIGACLP

## (undated) DEVICE — TROCAR LAP L100MM DIA5MM BLDELSS W/ STBL SL ENDOPATH XCEL

## (undated) DEVICE — Device

## (undated) DEVICE — SUTURE SZ 0 27IN 5/8 CIR UR-6  TAPER PT VIOLET ABSRB VICRYL J603H

## (undated) DEVICE — SUTURE PROL SZ 3-0 L36IN NONABSORBABLE BLU V-7 L26MM 1/2 8976H

## (undated) DEVICE — Device: Brand: DEFENDO VALVE AND CONNECTOR KIT

## (undated) DEVICE — TOWEL,OR,DSP,ST,BLUE,STD,4/PK,20PK/CS: Brand: MEDLINE

## (undated) DEVICE — SOLUTION IRRIG 1500ML STRL H2O USP POUR PLAS BTL